# Patient Record
Sex: FEMALE | Race: WHITE | NOT HISPANIC OR LATINO
[De-identification: names, ages, dates, MRNs, and addresses within clinical notes are randomized per-mention and may not be internally consistent; named-entity substitution may affect disease eponyms.]

---

## 2017-12-18 ENCOUNTER — TRANSCRIPTION ENCOUNTER (OUTPATIENT)
Age: 72
End: 2017-12-18

## 2017-12-22 ENCOUNTER — APPOINTMENT (OUTPATIENT)
Dept: INTERNAL MEDICINE | Facility: CLINIC | Age: 72
End: 2017-12-22
Payer: MEDICARE

## 2017-12-22 VITALS
SYSTOLIC BLOOD PRESSURE: 108 MMHG | TEMPERATURE: 99.6 F | RESPIRATION RATE: 16 BRPM | HEART RATE: 88 BPM | DIASTOLIC BLOOD PRESSURE: 76 MMHG

## 2017-12-22 VITALS — HEIGHT: 65 IN

## 2017-12-22 DIAGNOSIS — Z82.49 FAMILY HISTORY OF ISCHEMIC HEART DISEASE AND OTHER DISEASES OF THE CIRCULATORY SYSTEM: ICD-10-CM

## 2017-12-22 DIAGNOSIS — F15.90 OTHER STIMULANT USE, UNSPECIFIED, UNCOMPLICATED: ICD-10-CM

## 2017-12-22 DIAGNOSIS — Z82.5 FAMILY HISTORY OF ASTHMA AND OTHER CHRONIC LOWER RESPIRATORY DISEASES: ICD-10-CM

## 2017-12-22 DIAGNOSIS — Z91.89 OTHER SPECIFIED PERSONAL RISK FACTORS, NOT ELSEWHERE CLASSIFIED: ICD-10-CM

## 2017-12-22 DIAGNOSIS — Z83.3 FAMILY HISTORY OF DIABETES MELLITUS: ICD-10-CM

## 2017-12-22 LAB — CYTOLOGY CVX/VAG DOC THIN PREP: NORMAL

## 2017-12-22 PROCEDURE — 99203 OFFICE O/P NEW LOW 30 MIN: CPT

## 2018-12-03 ENCOUNTER — APPOINTMENT (OUTPATIENT)
Dept: INTERNAL MEDICINE | Facility: CLINIC | Age: 73
End: 2018-12-03
Payer: MEDICARE

## 2018-12-03 VITALS — DIASTOLIC BLOOD PRESSURE: 72 MMHG | SYSTOLIC BLOOD PRESSURE: 132 MMHG | HEART RATE: 100 BPM | RESPIRATION RATE: 20 BRPM

## 2018-12-03 VITALS — HEIGHT: 65 IN

## 2018-12-03 DIAGNOSIS — Z87.09 PERSONAL HISTORY OF OTHER DISEASES OF THE RESPIRATORY SYSTEM: ICD-10-CM

## 2018-12-03 DIAGNOSIS — Z12.31 ENCOUNTER FOR SCREENING MAMMOGRAM FOR MALIGNANT NEOPLASM OF BREAST: ICD-10-CM

## 2018-12-03 PROCEDURE — 99214 OFFICE O/P EST MOD 30 MIN: CPT

## 2018-12-03 RX ORDER — AMOXICILLIN AND CLAVULANATE POTASSIUM 875; 125 MG/1; MG/1
875-125 TABLET, COATED ORAL
Qty: 20 | Refills: 0 | Status: DISCONTINUED | COMMUNITY
Start: 2017-12-22 | End: 2018-12-03

## 2018-12-03 RX ORDER — FLUTICASONE PROPIONATE 50 UG/1
50 SPRAY, METERED NASAL DAILY
Qty: 1 | Refills: 1 | Status: DISCONTINUED | COMMUNITY
Start: 2017-12-22 | End: 2018-12-03

## 2018-12-03 NOTE — PHYSICAL EXAM
[No Acute Distress] : no acute distress [Well Nourished] : well nourished [Well Developed] : well developed [Well-Appearing] : well-appearing [Normal Sclera/Conjunctiva] : normal sclera/conjunctiva [PERRL] : pupils equal round and reactive to light [EOMI] : extraocular movements intact [Normal Outer Ear/Nose] : the outer ears and nose were normal in appearance [Normal Oropharynx] : the oropharynx was normal [No JVD] : no jugular venous distention [Supple] : supple [No Lymphadenopathy] : no lymphadenopathy [Thyroid Normal, No Nodules] : the thyroid was normal and there were no nodules present [No Respiratory Distress] : no respiratory distress  [Clear to Auscultation] : lungs were clear to auscultation bilaterally [No Accessory Muscle Use] : no accessory muscle use [Normal Rate] : normal rate  [Regular Rhythm] : with a regular rhythm [Normal S1, S2] : normal S1 and S2 [No Murmur] : no murmur heard [No Carotid Bruits] : no carotid bruits [No Abdominal Bruit] : a ~M bruit was not heard ~T in the abdomen [Pedal Pulses Present] : the pedal pulses are present [No Edema] : there was no peripheral edema [Normal Appearance] : normal in appearance [No Nipple Discharge] : no nipple discharge [No Axillary Lymphadenopathy] : no axillary lymphadenopathy [Soft] : abdomen soft [Non Tender] : non-tender [Non-distended] : non-distended [No Masses] : no abdominal mass palpated [No HSM] : no HSM [Normal Bowel Sounds] : normal bowel sounds [Normal Axillary Nodes] : no axillary lymphadenopathy [Normal Posterior Cervical Nodes] : no posterior cervical lymphadenopathy [Normal Anterior Cervical Nodes] : no anterior cervical lymphadenopathy [No CVA Tenderness] : no CVA  tenderness [No Spinal Tenderness] : no spinal tenderness [Normal Gait] : normal gait [Coordination Grossly Intact] : coordination grossly intact [No Focal Deficits] : no focal deficits [Speech Grossly Normal] : speech grossly normal [Memory Grossly Normal] : memory grossly normal [Normal Affect] : the affect was normal [Alert and Oriented x3] : oriented to person, place, and time [Normal Mood] : the mood was normal [Normal Insight/Judgement] : insight and judgment were intact [de-identified] : + fibrocystic changes at the rt superior breast [de-identified] : some tenderrness at the lower rt ribs, shoulder and lateral epicondyle.  nl strength

## 2018-12-03 NOTE — REVIEW OF SYSTEMS
[Joint Pain] : joint pain [Muscle Pain] : muscle pain [Negative] : Heme/Lymph [Skin Rash] : no skin rash

## 2018-12-03 NOTE — HEALTH RISK ASSESSMENT
[Patient reported mammogram was normal] : Patient reported mammogram was normal [Patient reported PAP Smear was normal] : Patient reported PAP Smear was normal [Patient reported colonoscopy was normal] : Patient reported colonoscopy was normal [None] : None [With Family] : lives with family [Single] : single [Significant Other] : lives with significant other [Fully functional (bathing, dressing, toileting, transferring, walking, feeding)] : Fully functional (bathing, dressing, toileting, transferring, walking, feeding) [Fully functional (using the telephone, shopping, preparing meals, housekeeping, doing laundry, using] : Fully functional and needs no help or supervision to perform IADLs (using the telephone, shopping, preparing meals, housekeeping, doing laundry, using transportation, managing medications and managing finances) [No falls in past year] : Patient reported no falls in the past year [0] : 2) Feeling down, depressed, or hopeless: Not at all (0) [Change in mental status noted] : No change in mental status noted [MammogramDate] : 2013 [PapSmearDate] : 2013 [ColonoscopyDate] : 2013 [] : No [[id number]] : 0

## 2018-12-03 NOTE — HISTORY OF PRESENT ILLNESS
[FreeTextEntry1] : pain, htn [de-identified] : 72 y/o female with a hx of HTN , kidney donor here for f/u and acute sx.  \par Saw cardiology for the BP in August.  will be getting stress.  \par Has been on low sodium diet.  \par Has been going on exercise bike daily.\par Reports that she has been having pain for ~ 1 mo.  Located at the right shoulder to the scapula to the rt side and arm.  Described as dull ache, mild.  constant, waxes and wanes.  Some relief with tylenol.  Occ worse when sleeping with the arm up.  Has not been trying any other rx.  no other relieving or exacerbating factors.  No assoc weakness, numbness, erythema, swelling.  No bruising.  Had started after helping BF out of bed.  Worried re: tenderness at the side of the chest.  \par \par mamm/pap overdue.\par \par Had flu vaccine\par prevnar last year - due for pneumo on 12/14

## 2018-12-03 NOTE — ASSESSMENT
[FreeTextEntry1] : tenderness likely strain as noted.  HCM not up to date.  d/w pt going for screening.

## 2018-12-21 ENCOUNTER — FORM ENCOUNTER (OUTPATIENT)
Age: 73
End: 2018-12-21

## 2018-12-22 ENCOUNTER — OUTPATIENT (OUTPATIENT)
Dept: OUTPATIENT SERVICES | Facility: HOSPITAL | Age: 73
LOS: 1 days | End: 2018-12-22
Payer: MEDICARE

## 2018-12-22 ENCOUNTER — APPOINTMENT (OUTPATIENT)
Dept: MAMMOGRAPHY | Facility: CLINIC | Age: 73
End: 2018-12-22
Payer: MEDICARE

## 2018-12-22 DIAGNOSIS — Z00.8 ENCOUNTER FOR OTHER GENERAL EXAMINATION: ICD-10-CM

## 2018-12-22 DIAGNOSIS — Z12.31 ENCOUNTER FOR SCREENING MAMMOGRAM FOR MALIGNANT NEOPLASM OF BREAST: ICD-10-CM

## 2018-12-22 PROCEDURE — 77063 BREAST TOMOSYNTHESIS BI: CPT

## 2018-12-22 PROCEDURE — 77067 SCR MAMMO BI INCL CAD: CPT | Mod: 26

## 2018-12-22 PROCEDURE — 77067 SCR MAMMO BI INCL CAD: CPT

## 2018-12-22 PROCEDURE — 77063 BREAST TOMOSYNTHESIS BI: CPT | Mod: 26

## 2018-12-26 ENCOUNTER — FORM ENCOUNTER (OUTPATIENT)
Age: 73
End: 2018-12-26

## 2018-12-27 ENCOUNTER — OUTPATIENT (OUTPATIENT)
Dept: OUTPATIENT SERVICES | Facility: HOSPITAL | Age: 73
LOS: 1 days | End: 2018-12-27
Payer: MEDICARE

## 2018-12-27 ENCOUNTER — APPOINTMENT (OUTPATIENT)
Dept: ULTRASOUND IMAGING | Facility: CLINIC | Age: 73
End: 2018-12-27
Payer: MEDICARE

## 2018-12-27 DIAGNOSIS — Z00.8 ENCOUNTER FOR OTHER GENERAL EXAMINATION: ICD-10-CM

## 2018-12-27 PROCEDURE — 76642 ULTRASOUND BREAST LIMITED: CPT

## 2018-12-27 PROCEDURE — 76642 ULTRASOUND BREAST LIMITED: CPT | Mod: 26,RT

## 2018-12-28 ENCOUNTER — APPOINTMENT (OUTPATIENT)
Dept: INTERNAL MEDICINE | Facility: CLINIC | Age: 73
End: 2018-12-28
Payer: MEDICARE

## 2018-12-28 VITALS — WEIGHT: 187 LBS | BODY MASS INDEX: 31.16 KG/M2 | HEIGHT: 65 IN

## 2018-12-28 VITALS
TEMPERATURE: 98.3 F | SYSTOLIC BLOOD PRESSURE: 150 MMHG | DIASTOLIC BLOOD PRESSURE: 80 MMHG | HEART RATE: 80 BPM | RESPIRATION RATE: 16 BRPM

## 2018-12-28 VITALS — DIASTOLIC BLOOD PRESSURE: 80 MMHG | SYSTOLIC BLOOD PRESSURE: 142 MMHG

## 2018-12-28 PROCEDURE — 99214 OFFICE O/P EST MOD 30 MIN: CPT | Mod: 25

## 2018-12-28 PROCEDURE — 36415 COLL VENOUS BLD VENIPUNCTURE: CPT

## 2018-12-28 NOTE — HISTORY OF PRESENT ILLNESS
[FreeTextEntry1] : pain, htn, breast mass and lad [de-identified] : 74 y/o female with a hx of HTN , kidney donor, breast mass and lad on mammo/sono \par here for f/u and acute sx.  \par Saw cardiology for the BP in August.  had stress - pt reports as nl.  \par Has been on low sodium diet.  \par Has been going on exercise bike daily - on hold for the aches.  \par Reports that she continues to have aches.  Still primarily at the rt side.  Has been more generalized at the body knees, arm, neck, arms.  no fevers.  + mild sore throat.  tongue has been bothering her for ` 1 week like it was burned.  Sx have been waxing and waning.  no noted chest sx.  no GI sx.  no urinary sx. ? some fatigue.  some relief with tylenol.  Reports feeling sl better with taking amoxil for a few days.\par \par mammo/sono 12/18 - mass and axillary node - for bx.\par pap overdue.\par \par Had flu vaccine\par prevnar last year - due for pneumo

## 2018-12-28 NOTE — REVIEW OF SYSTEMS
[Fatigue] : fatigue [Sore Throat] : sore throat [Joint Pain] : joint pain [Muscle Pain] : muscle pain [Negative] : Heme/Lymph [Fever] : no fever [Chills] : no chills [Hot Flashes] : no hot flashes [Night Sweats] : no night sweats [Recent Change In Weight] : ~T no recent weight change [Earache] : no earache [Hearing Loss] : no hearing loss [Nosebleed] : no nosebleeds [Hoarseness] : no hoarseness [Nasal Discharge] : no nasal discharge [Postnasal Drip] : no postnasal drip [Skin Rash] : no skin rash

## 2018-12-28 NOTE — PHYSICAL EXAM
[No Acute Distress] : no acute distress [Well Nourished] : well nourished [Well Developed] : well developed [Well-Appearing] : well-appearing [Normal Sclera/Conjunctiva] : normal sclera/conjunctiva [PERRL] : pupils equal round and reactive to light [EOMI] : extraocular movements intact [Normal Outer Ear/Nose] : the outer ears and nose were normal in appearance [Normal Oropharynx] : the oropharynx was normal [No JVD] : no jugular venous distention [Supple] : supple [No Lymphadenopathy] : no lymphadenopathy [Thyroid Normal, No Nodules] : the thyroid was normal and there were no nodules present [No Respiratory Distress] : no respiratory distress  [Clear to Auscultation] : lungs were clear to auscultation bilaterally [No Accessory Muscle Use] : no accessory muscle use [Normal Rate] : normal rate  [Regular Rhythm] : with a regular rhythm [Normal S1, S2] : normal S1 and S2 [No Murmur] : no murmur heard [No Carotid Bruits] : no carotid bruits [No Abdominal Bruit] : a ~M bruit was not heard ~T in the abdomen [Pedal Pulses Present] : the pedal pulses are present [No Edema] : there was no peripheral edema [Soft] : abdomen soft [Non Tender] : non-tender [Non-distended] : non-distended [No Masses] : no abdominal mass palpated [No HSM] : no HSM [Normal Bowel Sounds] : normal bowel sounds [Normal Axillary Nodes] : no axillary lymphadenopathy [Normal Posterior Cervical Nodes] : no posterior cervical lymphadenopathy [Normal Anterior Cervical Nodes] : no anterior cervical lymphadenopathy [No CVA Tenderness] : no CVA  tenderness [No Spinal Tenderness] : no spinal tenderness [No Joint Swelling] : no joint swelling [Grossly Normal Strength/Tone] : grossly normal strength/tone [Normal Gait] : normal gait [Coordination Grossly Intact] : coordination grossly intact [No Focal Deficits] : no focal deficits [Speech Grossly Normal] : speech grossly normal [Memory Grossly Normal] : memory grossly normal [Normal Affect] : the affect was normal [Alert and Oriented x3] : oriented to person, place, and time [Normal Mood] : the mood was normal [Normal Insight/Judgement] : insight and judgment were intact

## 2019-01-02 ENCOUNTER — FORM ENCOUNTER (OUTPATIENT)
Age: 74
End: 2019-01-02

## 2019-01-03 ENCOUNTER — RESULT REVIEW (OUTPATIENT)
Age: 74
End: 2019-01-03

## 2019-01-03 ENCOUNTER — APPOINTMENT (OUTPATIENT)
Dept: ULTRASOUND IMAGING | Facility: CLINIC | Age: 74
End: 2019-01-03
Payer: MEDICARE

## 2019-01-03 ENCOUNTER — OUTPATIENT (OUTPATIENT)
Dept: OUTPATIENT SERVICES | Facility: HOSPITAL | Age: 74
LOS: 1 days | End: 2019-01-03
Payer: MEDICARE

## 2019-01-03 DIAGNOSIS — N63.10 UNSPECIFIED LUMP IN THE RIGHT BREAST, UNSPECIFIED QUADRANT: ICD-10-CM

## 2019-01-03 DIAGNOSIS — R59.0 LOCALIZED ENLARGED LYMPH NODES: ICD-10-CM

## 2019-01-03 PROCEDURE — A4648: CPT

## 2019-01-03 PROCEDURE — 19084 BX BREAST ADD LESION US IMAG: CPT

## 2019-01-03 PROCEDURE — 19083 BX BREAST 1ST LESION US IMAG: CPT

## 2019-01-03 PROCEDURE — 77065 DX MAMMO INCL CAD UNI: CPT | Mod: 26,RT

## 2019-01-03 PROCEDURE — 19084 BX BREAST ADD LESION US IMAG: CPT | Mod: RT

## 2019-01-03 PROCEDURE — 77065 DX MAMMO INCL CAD UNI: CPT

## 2019-01-03 PROCEDURE — 19083 BX BREAST 1ST LESION US IMAG: CPT | Mod: RT

## 2019-01-04 LAB
ALBUMIN SERPL ELPH-MCNC: 4.7 G/DL
ALP BLD-CCNC: 76 U/L
ALT SERPL-CCNC: 54 U/L
ANA SER IF-ACNC: NEGATIVE
ANION GAP SERPL CALC-SCNC: 13 MMOL/L
AST SERPL-CCNC: 43 U/L
BASOPHILS # BLD AUTO: 0.01 K/UL
BASOPHILS NFR BLD AUTO: 0.1 %
BILIRUB SERPL-MCNC: 0.4 MG/DL
BUN SERPL-MCNC: 27 MG/DL
CALCIUM SERPL-MCNC: 9.9 MG/DL
CHLORIDE SERPL-SCNC: 100 MMOL/L
CO2 SERPL-SCNC: 30 MMOL/L
CREAT SERPL-MCNC: 1.32 MG/DL
CRP SERPL-MCNC: 0.46 MG/DL
EOSINOPHIL # BLD AUTO: 0.27 K/UL
EOSINOPHIL NFR BLD AUTO: 3.9 %
ERYTHROCYTE [SEDIMENTATION RATE] IN BLOOD BY WESTERGREN METHOD: 15 MM/HR
GLUCOSE SERPL-MCNC: 154 MG/DL
HCT VFR BLD CALC: 43.7 %
HGB BLD-MCNC: 13.9 G/DL
IMM GRANULOCYTES NFR BLD AUTO: 0.3 %
LYMPHOCYTES # BLD AUTO: 2.38 K/UL
LYMPHOCYTES NFR BLD AUTO: 34.6 %
MAN DIFF?: NORMAL
MCHC RBC-ENTMCNC: 29.4 PG
MCHC RBC-ENTMCNC: 31.8 GM/DL
MCV RBC AUTO: 92.6 FL
MONOCYTES # BLD AUTO: 0.45 K/UL
MONOCYTES NFR BLD AUTO: 6.5 %
NEUTROPHILS # BLD AUTO: 3.75 K/UL
NEUTROPHILS NFR BLD AUTO: 54.6 %
PLATELET # BLD AUTO: 311 K/UL
POTASSIUM SERPL-SCNC: 4.7 MMOL/L
PROT SERPL-MCNC: 7.3 G/DL
RBC # BLD: 4.72 M/UL
RBC # FLD: 13.6 %
RHEUMATOID FACT SER QL: <10 IU/ML
SODIUM SERPL-SCNC: 143 MMOL/L
WBC # FLD AUTO: 6.88 K/UL

## 2019-01-08 ENCOUNTER — OTHER (OUTPATIENT)
Age: 74
End: 2019-01-08

## 2019-01-15 ENCOUNTER — OUTPATIENT (OUTPATIENT)
Dept: OUTPATIENT SERVICES | Facility: HOSPITAL | Age: 74
LOS: 1 days | Discharge: HOME | End: 2019-01-15

## 2019-01-15 ENCOUNTER — APPOINTMENT (OUTPATIENT)
Dept: SURGICAL ONCOLOGY | Facility: CLINIC | Age: 74
End: 2019-01-15
Payer: MEDICARE

## 2019-01-15 VITALS
OXYGEN SATURATION: 95 % | HEART RATE: 93 BPM | DIASTOLIC BLOOD PRESSURE: 86 MMHG | HEIGHT: 65 IN | WEIGHT: 187 LBS | BODY MASS INDEX: 31.16 KG/M2 | SYSTOLIC BLOOD PRESSURE: 143 MMHG

## 2019-01-15 DIAGNOSIS — C50.919 MALIGNANT NEOPLASM OF UNSPECIFIED SITE OF UNSPECIFIED FEMALE BREAST: ICD-10-CM

## 2019-01-15 PROCEDURE — 99204 OFFICE O/P NEW MOD 45 MIN: CPT

## 2019-01-15 NOTE — CONSULT LETTER
[Dear  ___] : Dear  [unfilled], [Consult Letter:] : I had the pleasure of evaluating your patient, [unfilled]. [( Thank you for referring [unfilled] for consultation for _____ )] : Thank you for referring [unfilled] for consultation for [unfilled] [Please see my note below.] : Please see my note below. [Consult Closing:] : Thank you very much for allowing me to participate in the care of this patient.  If you have any questions, please do not hesitate to contact me. [Sincerely,] : Sincerely, [FreeTextEntry3] : Demetrio Robertson MD, FICS, FACS\par , Surgical Oncology\par [facility]\par [address]\[place], [place]- [address]\par \par [address]\par [place], [place]- [address]\par \par (mob) [phone]\par (o) [phone]\par (f) [phone]\par

## 2019-01-15 NOTE — ASSESSMENT
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 Patient expressed verbal understanding to proceed with proposed plan. All questions answered.\par I have discussed the risks, benefits, alternatives, complications including but not limited bleeding, infection, damage to adjacent structures,pneumothorax to the patient in detail. Patient expressed verbal understanding. Written informed consent to be obtained in the preoperative period.\par

## 2019-01-15 NOTE — REASON FOR VISIT
[Initial Consultation] : an initial consultation for [Breast Cancer] : breast cancer [Family Member] : family member

## 2019-01-15 NOTE — PHYSICAL EXAM
[Normal] : supple, no neck mass and thyroid not enlarged [Normal Neck Lymph Nodes] : normal neck lymph nodes  [Normal Supraclavicular Lymph Nodes] : normal supraclavicular lymph nodes [Normal Groin Lymph Nodes] : normal groin lymph nodes [Normal Axillary Lymph Nodes] : normal axillary lymph nodes [Normal] : oriented to person, place and time, with appropriate affect [de-identified] : R outer upper quadrant palpable minimally tender 3 cm size breast mass with no overlying skin changes, R NAC- insignificant, no palpable axillary or cervical lymphadenopathy. L breast/ axilla/neck- no palpable masses

## 2019-01-15 NOTE — HISTORY OF PRESENT ILLNESS
[de-identified] : 73 Y F presents to my office with a complaint of palpable breast mass R breast since Dec 2018, underwent mammogram followed by CNB of R breast mass and R axillary lymph node suggestive of invasive ductal carcinoma with metastatic lymphadenopathy.\par No prior h/o breast biopsy or ca .\par No family h/o breast ca

## 2019-01-16 ENCOUNTER — LABORATORY RESULT (OUTPATIENT)
Age: 74
End: 2019-01-16

## 2019-01-16 ENCOUNTER — RESULT REVIEW (OUTPATIENT)
Age: 74
End: 2019-01-16

## 2019-01-16 ENCOUNTER — APPOINTMENT (OUTPATIENT)
Dept: HEMATOLOGY ONCOLOGY | Facility: CLINIC | Age: 74
End: 2019-01-16
Payer: MEDICARE

## 2019-01-16 VITALS
TEMPERATURE: 98.7 F | BODY MASS INDEX: 31.15 KG/M2 | SYSTOLIC BLOOD PRESSURE: 144 MMHG | WEIGHT: 186.95 LBS | OXYGEN SATURATION: 95 % | HEIGHT: 65 IN | DIASTOLIC BLOOD PRESSURE: 83 MMHG | RESPIRATION RATE: 16 BRPM | HEART RATE: 90 BPM

## 2019-01-16 LAB
BASOPHILS # BLD AUTO: 0.1 K/UL — SIGNIFICANT CHANGE UP (ref 0–0.2)
BASOPHILS NFR BLD AUTO: 0.6 % — SIGNIFICANT CHANGE UP (ref 0–2)
EOSINOPHIL # BLD AUTO: 0.3 K/UL — SIGNIFICANT CHANGE UP (ref 0–0.5)
EOSINOPHIL NFR BLD AUTO: 3.1 % — SIGNIFICANT CHANGE UP (ref 0–6)
HCT VFR BLD CALC: 39.1 % — SIGNIFICANT CHANGE UP (ref 34.5–45)
HGB BLD-MCNC: 13.6 G/DL — SIGNIFICANT CHANGE UP (ref 11.5–15.5)
LYMPHOCYTES # BLD AUTO: 3.4 K/UL — HIGH (ref 1–3.3)
LYMPHOCYTES # BLD AUTO: 38.8 % — SIGNIFICANT CHANGE UP (ref 13–44)
MCHC RBC-ENTMCNC: 30.2 PG — SIGNIFICANT CHANGE UP (ref 27–34)
MCHC RBC-ENTMCNC: 34.7 G/DL — SIGNIFICANT CHANGE UP (ref 32–36)
MCV RBC AUTO: 86.9 FL — SIGNIFICANT CHANGE UP (ref 80–100)
MONOCYTES # BLD AUTO: 0.5 K/UL — SIGNIFICANT CHANGE UP (ref 0–0.9)
MONOCYTES NFR BLD AUTO: 6 % — SIGNIFICANT CHANGE UP (ref 2–14)
NEUTROPHILS # BLD AUTO: 4.5 K/UL — SIGNIFICANT CHANGE UP (ref 1.8–7.4)
NEUTROPHILS NFR BLD AUTO: 51.4 % — SIGNIFICANT CHANGE UP (ref 43–77)
PLATELET # BLD AUTO: 287 K/UL — SIGNIFICANT CHANGE UP (ref 150–400)
RBC # BLD: 4.5 M/UL — SIGNIFICANT CHANGE UP (ref 3.8–5.2)
RBC # FLD: 11.6 % — SIGNIFICANT CHANGE UP (ref 10.3–14.5)
WBC # BLD: 8.7 K/UL — SIGNIFICANT CHANGE UP (ref 3.8–10.5)
WBC # FLD AUTO: 8.7 K/UL — SIGNIFICANT CHANGE UP (ref 3.8–10.5)

## 2019-01-16 PROCEDURE — 99205 OFFICE O/P NEW HI 60 MIN: CPT

## 2019-01-16 RX ORDER — ZOSTER VACCINE RECOMBINANT, ADJUVANTED 50 MCG/0.5
50 KIT INTRAMUSCULAR DAILY
Qty: 1 | Refills: 1 | Status: DISCONTINUED | COMMUNITY
Start: 2018-12-03 | End: 2019-01-16

## 2019-01-16 RX ORDER — LIDOCAINE HYDROCHLORIDE 20 MG/ML
2 SOLUTION OROPHARYNGEAL
Qty: 1 | Refills: 0 | Status: DISCONTINUED | COMMUNITY
Start: 2018-12-28 | End: 2019-01-16

## 2019-01-16 RX ORDER — AMOXICILLIN AND CLAVULANATE POTASSIUM 875; 125 MG/1; MG/1
875-125 TABLET, COATED ORAL
Qty: 14 | Refills: 0 | Status: DISCONTINUED | COMMUNITY
Start: 2018-12-28 | End: 2019-01-16

## 2019-01-16 NOTE — REASON FOR VISIT
[Initial Consultation] : an initial consultation [Family Member] : family member [FreeTextEntry2] : Breast Cancer

## 2019-01-16 NOTE — PHYSICAL EXAM
[Fully active, able to carry on all pre-disease performance without restriction] : Status 0 - Fully active, able to carry on all pre-disease performance without restriction [Normal] : affect appropriate [de-identified] : RUOQ ~10'o clock amorphous~3cm palpable breast mass, core biopsy sites noted with some bruising, well healed. ~1cm lymph node palpated R axilla inferiorly, No other masses or adenopathy palpable

## 2019-01-16 NOTE — HISTORY OF PRESENT ILLNESS
[Disease: _____________________] : Disease: [unfilled] [T: ___] : T[unfilled] [N: ___] : N[unfilled] [AJCC Stage: ____] : AJCC Stage: [unfilled] [de-identified] : 72yo woman presenting for medical oncology consultation.\par \par Last mammogram . She has no history of abnormal breast imaging, breast biopsies, or surgeries.\par \par She saw her PMD who sent her for a screening mammogram after she palpated a mass on the R side of her chest 2018. She had been having body aches/soreness and feeling general malaise since November, despite a course of antibiotics (just after flu shot administered). Also some R shoulder/arm pains.\par \par 18 Mammogram screenin.7cm irregular spiculated mass upper outer right breast 8cm FN with associated architectural distortion and several associated microcalcifications. Partially included enlarged right axillary lymph nodes. BIRADS 0\par \par 18 bilateral breast US: R breast 10:00 7cm FN 2.6cm irregular hypoechoic mass (biopsy recommended). Inferior right axillary lymph node with focal area of eccentric cortical thickening (recommend US guided biopsy). 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 Michael 7/. 1.4cm involved, DCIS cribiform pattern with high grade nuclear atypia and apocrine cytology very focally present. (coil shaped clip) R axillary lymph node: metastatic ductal carcinoma with apocrine cytology involving a lymph node (hydromark marking clip) ER 0% IA 0% HER2 IHC 0 negative\par \par She saw Dr. Demetrio Robertson yesterday who recommended medical oncology consultation for neoadjuvant chemotherapy. \par \par She notes some occasional body aches still and fatigue. She notes an intermittent tongue sensation - like a scald from food (though she does not recall an episode of this). Otherwise she feels well. She is active at baseline, uses stationary bike at home most days for exercise. Patient denies headache, vision changes, fevers, chills, night sweats, dizziness, cough, chest pain, shortness of breath, palpitations, decreased exercise tolerance, nausea, vomiting abdominal pain, diarrhea, constipation, dysuria, back pain, edema, new rashes, bleeding or bruising, new or focal neuro symptoms, weight loss or gain.\par \par Pertinent History:\par No family history of breast or ovarian cancer.\par HTN - saw Cardiologist 2018, stress test normal per patient, Dr. Samaniego [facility], sees him annually. BP well controlled generally \par HLD \par Single kidney (kidney donor to brother in ) - last Cr 1.32 (18) though patient notes no prior renal issues\par Transaminitis AST 43, ALT 54 (18), no prior history\par BCC of skin removed\par Hysterectomy for fibroid uterus\par Cscope last ~5 years ago, no polyps per patient\par PMD Lance Lafleur\par Single, lives with her son Richard who is here with her today. 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 She is a retired RN. Planning a trip to [facility] with her boyfriend in May. [de-identified] : [id number]-

## 2019-01-17 ENCOUNTER — OTHER (OUTPATIENT)
Age: 74
End: 2019-01-17

## 2019-01-17 LAB
ALBUMIN SERPL ELPH-MCNC: 5 G/DL
ALP BLD-CCNC: 72 U/L
ALT SERPL-CCNC: 27 U/L
ANION GAP SERPL CALC-SCNC: 14 MMOL/L
APTT BLD: 31.3 SEC
AST SERPL-CCNC: 19 U/L
BILIRUB SERPL-MCNC: 0.3 MG/DL
BUN SERPL-MCNC: 26 MG/DL
CALCIUM SERPL-MCNC: 10.4 MG/DL
CHLORIDE SERPL-SCNC: 103 MMOL/L
CO2 SERPL-SCNC: 26 MMOL/L
CREAT SERPL-MCNC: 1.26 MG/DL
GLUCOSE SERPL-MCNC: 127 MG/DL
HBV CORE IGG+IGM SER QL: NONREACTIVE
HBV CORE IGM SER QL: NONREACTIVE
HBV SURFACE AB SER QL: NONREACTIVE
HBV SURFACE AG SER QL: NONREACTIVE
HCV AB SER QL: NONREACTIVE
HCV S/CO RATIO: 0.04 S/CO
INR PPP: 0.89 RATIO
POTASSIUM SERPL-SCNC: 3.8 MMOL/L
PROT SERPL-MCNC: 7.4 G/DL
PT BLD: 9.9 SEC
SODIUM SERPL-SCNC: 142 MMOL/L

## 2019-01-18 ENCOUNTER — FORM ENCOUNTER (OUTPATIENT)
Age: 74
End: 2019-01-18

## 2019-01-18 LAB — HEPATITIS A IGG ANTIBODY: REACTIVE

## 2019-01-19 ENCOUNTER — APPOINTMENT (OUTPATIENT)
Dept: NUCLEAR MEDICINE | Facility: IMAGING CENTER | Age: 74
End: 2019-01-19
Payer: MEDICARE

## 2019-01-19 ENCOUNTER — OUTPATIENT (OUTPATIENT)
Dept: OUTPATIENT SERVICES | Facility: HOSPITAL | Age: 74
LOS: 1 days | End: 2019-01-19
Payer: MEDICARE

## 2019-01-19 DIAGNOSIS — C50.919 MALIGNANT NEOPLASM OF UNSPECIFIED SITE OF UNSPECIFIED FEMALE BREAST: ICD-10-CM

## 2019-01-19 PROCEDURE — A9552: CPT

## 2019-01-19 PROCEDURE — 78815 PET IMAGE W/CT SKULL-THIGH: CPT

## 2019-01-19 PROCEDURE — 78815 PET IMAGE W/CT SKULL-THIGH: CPT | Mod: 26,PI

## 2019-01-23 ENCOUNTER — APPOINTMENT (OUTPATIENT)
Dept: CV DIAGNOSITCS | Facility: HOSPITAL | Age: 74
End: 2019-01-23

## 2019-01-23 ENCOUNTER — OUTPATIENT (OUTPATIENT)
Dept: OUTPATIENT SERVICES | Facility: HOSPITAL | Age: 74
LOS: 1 days | End: 2019-01-23
Payer: MEDICARE

## 2019-01-23 DIAGNOSIS — C50.919 MALIGNANT NEOPLASM OF UNSPECIFIED SITE OF UNSPECIFIED FEMALE BREAST: ICD-10-CM

## 2019-01-23 PROCEDURE — 93306 TTE W/DOPPLER COMPLETE: CPT | Mod: 26

## 2019-01-23 PROCEDURE — C8929: CPT

## 2019-01-24 ENCOUNTER — FORM ENCOUNTER (OUTPATIENT)
Age: 74
End: 2019-01-24

## 2019-01-24 ENCOUNTER — OUTPATIENT (OUTPATIENT)
Dept: OUTPATIENT SERVICES | Facility: HOSPITAL | Age: 74
LOS: 1 days | End: 2019-01-24
Payer: MEDICARE

## 2019-01-24 VITALS
WEIGHT: 210.1 LBS | DIASTOLIC BLOOD PRESSURE: 88 MMHG | RESPIRATION RATE: 18 BRPM | HEIGHT: 64 IN | TEMPERATURE: 97 F | SYSTOLIC BLOOD PRESSURE: 138 MMHG | HEART RATE: 81 BPM

## 2019-01-24 DIAGNOSIS — Z90.5 ACQUIRED ABSENCE OF KIDNEY: Chronic | ICD-10-CM

## 2019-01-24 DIAGNOSIS — Z98.891 HISTORY OF UTERINE SCAR FROM PREVIOUS SURGERY: Chronic | ICD-10-CM

## 2019-01-24 DIAGNOSIS — C50.911 MALIGNANT NEOPLASM OF UNSPECIFIED SITE OF RIGHT FEMALE BREAST: ICD-10-CM

## 2019-01-24 DIAGNOSIS — Z90.711 ACQUIRED ABSENCE OF UTERUS WITH REMAINING CERVICAL STUMP: Chronic | ICD-10-CM

## 2019-01-24 DIAGNOSIS — C50.919 MALIGNANT NEOPLASM OF UNSPECIFIED SITE OF UNSPECIFIED FEMALE BREAST: ICD-10-CM

## 2019-01-24 DIAGNOSIS — I10 ESSENTIAL (PRIMARY) HYPERTENSION: ICD-10-CM

## 2019-01-24 LAB
ALBUMIN SERPL ELPH-MCNC: 4.4 G/DL — SIGNIFICANT CHANGE UP (ref 3.3–5)
ALP SERPL-CCNC: 62 U/L — SIGNIFICANT CHANGE UP (ref 40–120)
ALT FLD-CCNC: 24 U/L — SIGNIFICANT CHANGE UP (ref 4–33)
ANION GAP SERPL CALC-SCNC: 14 MMO/L — SIGNIFICANT CHANGE UP (ref 7–14)
AST SERPL-CCNC: 17 U/L — SIGNIFICANT CHANGE UP (ref 4–32)
BILIRUB SERPL-MCNC: 0.3 MG/DL — SIGNIFICANT CHANGE UP (ref 0.2–1.2)
BUN SERPL-MCNC: 27 MG/DL — HIGH (ref 7–23)
CALCIUM SERPL-MCNC: 9.4 MG/DL — SIGNIFICANT CHANGE UP (ref 8.4–10.5)
CHLORIDE SERPL-SCNC: 102 MMOL/L — SIGNIFICANT CHANGE UP (ref 98–107)
CO2 SERPL-SCNC: 26 MMOL/L — SIGNIFICANT CHANGE UP (ref 22–31)
CREAT SERPL-MCNC: 1.18 MG/DL — SIGNIFICANT CHANGE UP (ref 0.5–1.3)
GLUCOSE SERPL-MCNC: 164 MG/DL — HIGH (ref 70–99)
POTASSIUM SERPL-MCNC: 3.7 MMOL/L — SIGNIFICANT CHANGE UP (ref 3.5–5.3)
POTASSIUM SERPL-SCNC: 3.7 MMOL/L — SIGNIFICANT CHANGE UP (ref 3.5–5.3)
PROT SERPL-MCNC: 6.9 G/DL — SIGNIFICANT CHANGE UP (ref 6–8.3)
SODIUM SERPL-SCNC: 142 MMOL/L — SIGNIFICANT CHANGE UP (ref 135–145)

## 2019-01-24 PROCEDURE — 93010 ELECTROCARDIOGRAM REPORT: CPT

## 2019-01-24 NOTE — H&P PST ADULT - GASTROINTESTINAL DETAILS
soft/nontender/no guarding/no bruit/no rebound tenderness/no rigidity/no organomegaly/no distention/bowel sounds normal/no masses palpable

## 2019-01-24 NOTE — H&P PST ADULT - RS GEN PE MLT RESP DETAILS PC
breath sounds equal/no wheezes/no rales/respirations non-labored/clear to auscultation bilaterally/no rhonchi

## 2019-01-24 NOTE — H&P PST ADULT - PSH
History of     History of unilateral nephrectomy  kidney donor - left  S/P partial hysterectomy  uterine fibroids

## 2019-01-24 NOTE — H&P PST ADULT - PROBLEM SELECTOR PLAN 1
Scheduled for Mediport Insertion on 01/25/19.  Lab result pending.  Preop, famotidine and chlorhexidine instructions provided and questions addressed.

## 2019-01-24 NOTE — H&P PST ADULT - NEGATIVE OPHTHALMOLOGIC SYMPTOMS
no lacrimation L/no discharge L/no lacrimation R/no blurred vision L/no blurred vision R/no discharge R

## 2019-01-24 NOTE — H&P PST ADULT - PMH
Essential hypertension    Gastroesophageal reflux disease without esophagitis    Malignant neoplasm of breast (female)  right Essential hypertension    Gastroesophageal reflux disease without esophagitis    Malignant neoplasm of breast (female)  right  Obesity

## 2019-01-24 NOTE — H&P PST ADULT - NSANTHOSAYNRD_GEN_A_CORE
No. JOCELYN screening performed.  STOP BANG Legend: 0-2 = LOW Risk; 3-4 = INTERMEDIATE Risk; 5-8 = HIGH Risk

## 2019-01-24 NOTE — H&P PST ADULT - FAMILY HISTORY
Sibling  Still living? No  Family history of type 2 diabetes mellitus in brother, Age at diagnosis: Age Unknown     Mother  Still living? Yes, Estimated age:   Family history of hypertension in mother, Age at diagnosis: Age Unknown  Family history of COPD (chronic obstructive pulmonary disease), Age at diagnosis: Age Unknown     Father  Still living? No  Family history of cirrhosis of liver, Age at diagnosis: Age Unknown     Child  Still living? Yes, Estimated age: Age Unknown  Family history of hypertension, Age at diagnosis: Age Unknown

## 2019-01-24 NOTE — H&P PST ADULT - HISTORY OF PRESENT ILLNESS
73 yr old female with medical hx htn and GERD presents for preop evaluation with dx of Malignant Neoplasm of right breast.  Pt went for routine mammogram in December after palpating a mass.  Pt subsequently had sonogram and biopsy.  Pt is now scheduled for Mediport Insertion on 01/25/19.

## 2019-01-25 ENCOUNTER — APPOINTMENT (OUTPATIENT)
Dept: SURGICAL ONCOLOGY | Facility: AMBULATORY SURGERY CENTER | Age: 74
End: 2019-01-25

## 2019-01-25 ENCOUNTER — OUTPATIENT (OUTPATIENT)
Dept: OUTPATIENT SERVICES | Facility: HOSPITAL | Age: 74
LOS: 1 days | Discharge: HOME | End: 2019-01-25
Payer: MEDICARE

## 2019-01-25 VITALS
TEMPERATURE: 98 F | RESPIRATION RATE: 18 BRPM | SYSTOLIC BLOOD PRESSURE: 140 MMHG | OXYGEN SATURATION: 98 % | WEIGHT: 210.1 LBS | HEART RATE: 81 BPM | HEIGHT: 64 IN | DIASTOLIC BLOOD PRESSURE: 72 MMHG

## 2019-01-25 VITALS
OXYGEN SATURATION: 100 % | HEART RATE: 87 BPM | TEMPERATURE: 98 F | DIASTOLIC BLOOD PRESSURE: 78 MMHG | RESPIRATION RATE: 16 BRPM | SYSTOLIC BLOOD PRESSURE: 141 MMHG

## 2019-01-25 DIAGNOSIS — Z98.891 HISTORY OF UTERINE SCAR FROM PREVIOUS SURGERY: Chronic | ICD-10-CM

## 2019-01-25 DIAGNOSIS — Z90.5 ACQUIRED ABSENCE OF KIDNEY: Chronic | ICD-10-CM

## 2019-01-25 DIAGNOSIS — Z90.711 ACQUIRED ABSENCE OF UTERUS WITH REMAINING CERVICAL STUMP: Chronic | ICD-10-CM

## 2019-01-25 DIAGNOSIS — C50.911 MALIGNANT NEOPLASM OF UNSPECIFIED SITE OF RIGHT FEMALE BREAST: ICD-10-CM

## 2019-01-25 PROCEDURE — 71045 X-RAY EXAM CHEST 1 VIEW: CPT | Mod: 26

## 2019-01-25 PROCEDURE — 36561 INSERT TUNNELED CV CATH: CPT

## 2019-01-25 PROCEDURE — 77001 FLUOROGUIDE FOR VEIN DEVICE: CPT | Mod: 26,59

## 2019-01-25 RX ORDER — OXYCODONE HYDROCHLORIDE 5 MG/1
1 TABLET ORAL
Qty: 1010 | Refills: 0 | OUTPATIENT
Start: 2019-01-25 | End: 2019-01-26

## 2019-01-25 RX ORDER — OXYCODONE HYDROCHLORIDE 5 MG/1
1 TABLET ORAL
Qty: 10 | Refills: 0
Start: 2019-01-25 | End: 2019-01-26

## 2019-01-25 NOTE — ASU DISCHARGE PLAN (ADULT/PEDIATRIC). - NOTIFY
Swelling that continues/Inability to Tolerate Liquids or Foods/Fever greater than 101/Bleeding that does not stop/Pain not relieved by Medications/Persistent Nausea and Vomiting

## 2019-01-25 NOTE — ASU DISCHARGE PLAN (ADULT/PEDIATRIC). - MEDICATION SUMMARY - MEDICATIONS TO TAKE
I will START or STAY ON the medications listed below when I get home from the hospital:    oxyCODONE 5 mg oral capsule  -- 1 cap(s) by mouth every 6 hours, As Needed MDD:6  -- Caution federal law prohibits the transfer of this drug to any person other  than the person for whom it was prescribed.  It is very important that you take or use this exactly as directed.  Do not skip doses or discontinue unless directed by your doctor.  May cause drowsiness.  Alcohol may intensify this effect.  Use care when operating dangerous machinery.  This prescription cannot be refilled.  Using more of this medication than prescribed may cause serious breathing problems.    -- Indication: For Malignant neoplasm of right female breast    Diovan  mg-25 mg oral tablet  -- 1 tab(s) by mouth once a day  -- Indication: For home med    Procardia XL 30 mg oral tablet, extended release  -- 1 tab(s) by mouth once a day  -- Indication: For home med    PriLOSEC 20 mg oral delayed release capsule  -- 1 cap(s) by mouth once a day  -- Indication: For home med

## 2019-01-26 ENCOUNTER — FORM ENCOUNTER (OUTPATIENT)
Age: 74
End: 2019-01-26

## 2019-01-27 ENCOUNTER — FORM ENCOUNTER (OUTPATIENT)
Age: 74
End: 2019-01-27

## 2019-01-27 ENCOUNTER — APPOINTMENT (OUTPATIENT)
Dept: MRI IMAGING | Facility: IMAGING CENTER | Age: 74
End: 2019-01-27

## 2019-01-27 ENCOUNTER — OUTPATIENT (OUTPATIENT)
Dept: OUTPATIENT SERVICES | Facility: HOSPITAL | Age: 74
LOS: 1 days | End: 2019-01-27
Payer: MEDICARE

## 2019-01-27 DIAGNOSIS — C50.919 MALIGNANT NEOPLASM OF UNSPECIFIED SITE OF UNSPECIFIED FEMALE BREAST: ICD-10-CM

## 2019-01-27 DIAGNOSIS — Z90.711 ACQUIRED ABSENCE OF UTERUS WITH REMAINING CERVICAL STUMP: Chronic | ICD-10-CM

## 2019-01-27 DIAGNOSIS — Z98.891 HISTORY OF UTERINE SCAR FROM PREVIOUS SURGERY: Chronic | ICD-10-CM

## 2019-01-27 DIAGNOSIS — Z90.5 ACQUIRED ABSENCE OF KIDNEY: Chronic | ICD-10-CM

## 2019-01-27 PROCEDURE — 70553 MRI BRAIN STEM W/O & W/DYE: CPT

## 2019-01-27 PROCEDURE — 70553 MRI BRAIN STEM W/O & W/DYE: CPT | Mod: 26

## 2019-01-27 PROCEDURE — A9585: CPT

## 2019-01-28 ENCOUNTER — APPOINTMENT (OUTPATIENT)
Dept: HEMATOLOGY ONCOLOGY | Facility: CLINIC | Age: 74
End: 2019-01-28
Payer: MEDICARE

## 2019-01-28 ENCOUNTER — OUTPATIENT (OUTPATIENT)
Dept: OUTPATIENT SERVICES | Facility: HOSPITAL | Age: 74
LOS: 1 days | Discharge: HOME | End: 2019-01-28

## 2019-01-28 ENCOUNTER — APPOINTMENT (OUTPATIENT)
Dept: CARDIOLOGY | Facility: CLINIC | Age: 74
End: 2019-01-28
Payer: MEDICARE

## 2019-01-28 ENCOUNTER — APPOINTMENT (OUTPATIENT)
Dept: MRI IMAGING | Facility: IMAGING CENTER | Age: 74
End: 2019-01-28
Payer: MEDICARE

## 2019-01-28 ENCOUNTER — NON-APPOINTMENT (OUTPATIENT)
Age: 74
End: 2019-01-28

## 2019-01-28 ENCOUNTER — APPOINTMENT (OUTPATIENT)
Dept: CT IMAGING | Facility: IMAGING CENTER | Age: 74
End: 2019-01-28
Payer: MEDICARE

## 2019-01-28 ENCOUNTER — OUTPATIENT (OUTPATIENT)
Dept: OUTPATIENT SERVICES | Facility: HOSPITAL | Age: 74
LOS: 1 days | End: 2019-01-28
Payer: MEDICARE

## 2019-01-28 VITALS
BODY MASS INDEX: 30.99 KG/M2 | HEIGHT: 65 IN | HEART RATE: 84 BPM | WEIGHT: 186 LBS | SYSTOLIC BLOOD PRESSURE: 131 MMHG | DIASTOLIC BLOOD PRESSURE: 82 MMHG | OXYGEN SATURATION: 95 %

## 2019-01-28 DIAGNOSIS — Z90.711 ACQUIRED ABSENCE OF UTERUS WITH REMAINING CERVICAL STUMP: Chronic | ICD-10-CM

## 2019-01-28 DIAGNOSIS — C50.919 MALIGNANT NEOPLASM OF UNSPECIFIED SITE OF UNSPECIFIED FEMALE BREAST: ICD-10-CM

## 2019-01-28 DIAGNOSIS — Z98.891 HISTORY OF UTERINE SCAR FROM PREVIOUS SURGERY: Chronic | ICD-10-CM

## 2019-01-28 DIAGNOSIS — Z90.5 ACQUIRED ABSENCE OF KIDNEY: Chronic | ICD-10-CM

## 2019-01-28 PROBLEM — K21.9 GASTRO-ESOPHAGEAL REFLUX DISEASE WITHOUT ESOPHAGITIS: Chronic | Status: ACTIVE | Noted: 2019-01-24

## 2019-01-28 PROBLEM — I10 ESSENTIAL (PRIMARY) HYPERTENSION: Chronic | Status: ACTIVE | Noted: 2019-01-24

## 2019-01-28 PROBLEM — E66.9 OBESITY, UNSPECIFIED: Chronic | Status: ACTIVE | Noted: 2019-01-24

## 2019-01-28 PROCEDURE — 71260 CT THORAX DX C+: CPT | Mod: 26

## 2019-01-28 PROCEDURE — 93000 ELECTROCARDIOGRAM COMPLETE: CPT

## 2019-01-28 PROCEDURE — 82565 ASSAY OF CREATININE: CPT

## 2019-01-28 PROCEDURE — 99204 OFFICE O/P NEW MOD 45 MIN: CPT

## 2019-01-28 PROCEDURE — 99215 OFFICE O/P EST HI 40 MIN: CPT

## 2019-01-28 PROCEDURE — 71260 CT THORAX DX C+: CPT

## 2019-01-29 ENCOUNTER — LABORATORY RESULT (OUTPATIENT)
Age: 74
End: 2019-01-29

## 2019-01-29 ENCOUNTER — RESULT REVIEW (OUTPATIENT)
Age: 74
End: 2019-01-29

## 2019-01-29 ENCOUNTER — APPOINTMENT (OUTPATIENT)
Dept: INFUSION THERAPY | Facility: HOSPITAL | Age: 74
End: 2019-01-29

## 2019-01-29 LAB
BASOPHILS # BLD AUTO: 0.1 K/UL — SIGNIFICANT CHANGE UP (ref 0–0.2)
BASOPHILS NFR BLD AUTO: 0.8 % — SIGNIFICANT CHANGE UP (ref 0–2)
EOSINOPHIL # BLD AUTO: 0.3 K/UL — SIGNIFICANT CHANGE UP (ref 0–0.5)
EOSINOPHIL NFR BLD AUTO: 2.9 % — SIGNIFICANT CHANGE UP (ref 0–6)
HCT VFR BLD CALC: 38.5 % — SIGNIFICANT CHANGE UP (ref 34.5–45)
HGB BLD-MCNC: 13.2 G/DL — SIGNIFICANT CHANGE UP (ref 11.5–15.5)
LYMPHOCYTES # BLD AUTO: 2.6 K/UL — SIGNIFICANT CHANGE UP (ref 1–3.3)
LYMPHOCYTES # BLD AUTO: 29.1 % — SIGNIFICANT CHANGE UP (ref 13–44)
MCHC RBC-ENTMCNC: 29.8 PG — SIGNIFICANT CHANGE UP (ref 27–34)
MCHC RBC-ENTMCNC: 34.2 G/DL — SIGNIFICANT CHANGE UP (ref 32–36)
MCV RBC AUTO: 87.2 FL — SIGNIFICANT CHANGE UP (ref 80–100)
MONOCYTES # BLD AUTO: 0.6 K/UL — SIGNIFICANT CHANGE UP (ref 0–0.9)
MONOCYTES NFR BLD AUTO: 7.2 % — SIGNIFICANT CHANGE UP (ref 2–14)
NEUTROPHILS # BLD AUTO: 5.3 K/UL — SIGNIFICANT CHANGE UP (ref 1.8–7.4)
NEUTROPHILS NFR BLD AUTO: 60 % — SIGNIFICANT CHANGE UP (ref 43–77)
PLATELET # BLD AUTO: 238 K/UL — SIGNIFICANT CHANGE UP (ref 150–400)
RBC # BLD: 4.42 M/UL — SIGNIFICANT CHANGE UP (ref 3.8–5.2)
RBC # FLD: 11.8 % — SIGNIFICANT CHANGE UP (ref 10.3–14.5)
WBC # BLD: 8.8 K/UL — SIGNIFICANT CHANGE UP (ref 3.8–10.5)
WBC # FLD AUTO: 8.8 K/UL — SIGNIFICANT CHANGE UP (ref 3.8–10.5)

## 2019-01-29 RX ORDER — NIFEDIPINE 30 MG
1 TABLET, EXTENDED RELEASE 24 HR ORAL
Qty: 0 | Refills: 0 | COMMUNITY

## 2019-01-29 NOTE — ASSESSMENT
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 Staging imaging without clear evidence of metastatic disease. Anatomic stage IIB, AJCC 8th edition clinical prognostic stage IIIB.\par \par I again discussed the natural history and progression of invasive breast cancer, as well as the significance of hormone receptor positivity, HER2 status, prognosis, and treatment options. When breast cancer is confined to the breast or axillary lymph nodes it is potentially curable.  When cancer is detected in distant organs or bone, it is treatable but not curable. I explained that even when a carcinoma has been completely removed by surgery, microscopic cells can escape into the circulation, seed, and grow into clinically significant metastases that are incurable. The role of chemotherapy, immunotherapy, and hormonal therapies are to reduce this risk. \par \par I explained that there are two approaches to breast surgery and treatments for invasive breast cancers. In one instance, surgery is performed first, either by mastectomy or lumpectomy, followed by post-operative "adjuvant" chemotherapy. The second approach involves administration of chemotherapy before surgery ("neoadjuvant") followed by breast surgery. Neoadjuvant combination chemotherapy is administered to locally advanced breast cancer patients (breast cancer that has spread outside of the breast to the local lymph nodes) in order to make surgery feasible, increase the chance of cure, and potentially decrease the amount of axillary lymph node surgery that is necessary. We discussed the role of neoadjuvant chemotherapy in triple negative breast cancer, given the aggressive nature of this subtype of breast cancer. A potential advantage of neoadjuvant chemotherapy is the potentially to shrink the tumor and possibly thereafter perform breast-conserving lumpectomy surgery rather than complete mastectomy. Patient notes she has already decided that she would prefer to have a bilateral mastectomy ultimately. We reviewed that she would tentatively be scheduled for surgery within a few weeks of completing neoadjuvant chemotherapy. Disease is monitored with clinical exam with each visit every 2-3 weeks and occasionally with repeat imaging pre-operatively to assess for response to therapy.  \par \par We discussed my recommendation for “ACT” chemotherapy administered on a dose-dense schedule (ddAC-T). This regimen consists of Adriamycin 60mg/m2 + cytoxan 600mg/m2 every 2 weeks x 4 cycles followed by Taxol 80mg/m2 weekly x 12 cycles. Neulasta is administered either via on-body injector placed on the day of chemotherapy after each "AC" chemotherapy is administered. Prior to treatment on day 1 of each cycle she will have an office visit for lab work, exam, and review of toxicities/symptoms to monitor for. We discussed potential side-effects including nausea, vomiting, alopecia, fatigue, body aches, myelosuppression potentially necessitating transfusion support (for which Neulasta is used to reduce the period of neutropenia and therefore the risk of infection), increased risk of infection (potentially resulting in sepsis, septic shock or even death), liver damage, kidney damage (especially at risk given her single kidney), mucositis, anaphylaxis, and potential long term and serious effects of neuropathy, cardiac toxicity/heart failure, and leukemia/blood cancers that are treatment-related (approximately 1% at 10 years), were reviewed in detail among others. We discussed that anthracycline-induced cardiotoxicity may manifest as early (or acute) or late (delayed) events. She will have periodic echocardiographic monitoring will be performed during and after treatment, and follow up with Oncocardiology Dr. Yadav. We also discussed the use of cold caps to prevent alopecia (she defers this option), as well as anti-emetics and premedication to treat symptoms.\par \par All of the patient’s questions were answered and she expressed understanding of the risks and benefits of therapy. She elects to proceed with treatment and she has provided written consent for this today (witnessed by TOYIN Euceda). She was given written information regarding the treatment plan and potential side effects for review. \par \par -pre-treatment labs reviewed, CrCl slightly impaired (single kidney) and prior mild LFT abnormalities resolved - close monitoring on treatment and low threshold for IV fluid hydration, discussed appropriate hydration with patient at length today\par -begin AC chemotherapy tomorrow C1D1 with neulasta OnPro\par -follow up Dr. Yadav 2-3 weeks with repeat TTE planned, continue new antihypertensives and home BP monitoring per her recommendations\par -MRI Breasts 2/5 earliest available appointment\par -consider repeat CT Chest in 1 month for pulmonary nodules (discussed this result with patient and inconclusive findings regarding nodules, too small to biopsy, interval re-evaluation planned)\par -interval repeat MRI brain planned\par -Reglan PRN eprescribed to patient's pharmacy for nausea, she defers EMLA cream use for Mediport treatments\par -referral to ENT, maxillary polyp and tongue symptoms\par -patient's best contact home number: [phone]\par -RTC 2 weeks with next treatment 2/13, sooner if issues - patient was instructed to go to ER immediately for fever >100.4 or any concerning symptoms, call MD line 24/7 with questions or concerns

## 2019-01-29 NOTE — HISTORY OF PRESENT ILLNESS
[Disease: _____________________] : Disease: [unfilled] [T: ___] : T[unfilled] [N: ___] : N[unfilled] [AJCC Stage: ____] : AJCC Stage: [unfilled] [Treatment Protocol] : Treatment Protocol [de-identified] : 72yo woman presenting for medical oncology consultation.\par \par Last mammogram . She has no history of abnormal breast imaging, breast biopsies, or surgeries.\par \par She saw her PMD who sent her for a screening mammogram after she palpated a mass on the R side of her chest 2018. She had been having body aches/soreness and feeling general malaise since November, despite a course of antibiotics (just after flu shot administered). Also some R shoulder/arm pains.\par \par 18 Mammogram screenin.7cm irregular spiculated mass upper outer right breast 8cm FN with associated architectural distortion and several associated microcalcifications. Partially included enlarged right axillary lymph nodes. BIRADS 0\par \par 18 bilateral breast US: R breast 10:00 7cm FN 2.6cm irregular hypoechoic mass (biopsy recommended). Inferior right axillary lymph node with focal area of eccentric cortical thickening (recommend US guided biopsy). 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 Michael 7/9. 1.4cm involved, DCIS cribiform pattern with high grade nuclear atypia and apocrine cytology very focally present. (coil shaped clip) R axillary lymph node: metastatic ductal carcinoma with apocrine cytology involving a lymph node (hydromark marking clip) ER 0% CO 0% HER2 IHC 0 negative\par \par She saw Dr. Demetrio Robertson yesterday who recommended medical oncology consultation for neoadjuvant chemotherapy. \par \par She noted on intial visit with me some occasional body aches still and fatigue over the last month. She notes an intermittent tongue sensation - like a scald from food (though she does not recall an episode of this). Symptoms intermittent for months. Otherwise she feels well. She is active at baseline, uses stationary bike at home most days for exercise. \par \par 19 PET/CT: Superolateral R breast and R axillary lymph nodes FDG-avid. S/p left nephrectomy. Few subcm b/l pulmonary nodules are nonspecific. Please correlate clinically for infection and follow up with CT chest in 1 month.\par \par TTE 19: EF 64%, GLS -20 Wall motion abnormality in inferolateral wall reported, EKG stable. Patient seen by Dr. Lorrie Yadav (Cardiology) - TTE reviewed without significant wall motion abnormalities, antihypertensives changed and pre-treatment lab work ordered.\par \par MRI Brain w/wo contrast 19: L maxillary polyp v. retention cyst. Non enhancing area of abnormal T2 prolongation involving left thalamic region, nonspecific.\par \par CT Chest 19: No interval changes since 19. Stable 3.3x2.1cm UOQ R breast, 2x1.2cm R axillary lymph node. Stable 4mm and smaller scattered indeterminant pulmonary nodules.\par \par Pertinent History:\par No family history of breast or ovarian cancer.\par HTN - saw Cardiologist 2018, stress test normal per patient, Dr. Samaniego [facility], sees him annually. BP well controlled generally \par HLD \par Single kidney (kidney donor to brother in ) - last Cr 1.32 (18) though patient notes no prior renal issues\par Transaminitis AST 43, ALT 54 (18), no prior history\par BCC of skin removed\par Hysterectomy for fibroid uterus\par Cscope last ~5 years ago, no polyps per patient\par PMD Lance Lafleur\par Single, lives with her son Richard who is here with her today. 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 She is a retired RN. Planning a trip to [facility] with her boyfriend in May. [de-identified] : ER-[place]-HER2- [FreeTextEntry1] : Neoadjuvant Adriamycin 60mg/m2 + Cytoxan 600mg/m2 every 14 days x 4 cycles with Neulasta OnPro support (Day 1), to be followed by Taxol 80mg/m2 weekly for 12 doses [de-identified] : Patient presents today for follow up after staging imaging and treatment planning performed. She notes she received a new antihypertensive regimen from Dr. Yadav today and plans to  these medications and begin today. She has no other complaints; she notes tongue symptoms have improved/resolved this week (they have waxed and waned for years). She feels well and is anxious to begin treatment.

## 2019-01-29 NOTE — HISTORY OF PRESENT ILLNESS
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 EF preserved\par STRESS: No ischemia on EST plain\par \par \par Presents for precancer treatment evaluation, CV risk stratification   and further evaluation of wall motion abnormality before chemotherapy with ACT\par \par She is here with her son today\par \par ROS: Denies Chest pain, dyspnea, palpitations, dizziness or syncope.\par

## 2019-01-29 NOTE — PHYSICAL EXAM
[Fully active, able to carry on all pre-disease performance without restriction] : Status 0 - Fully active, able to carry on all pre-disease performance without restriction [Normal] : supple without JVD, no thyromegaly or masses appreciated [de-identified] : L chest  wall port with steristrips, minimal bruising, nontender

## 2019-01-29 NOTE — PHYSICAL EXAM
[General Appearance - Well Developed] : well developed [Normal Appearance] : normal appearance [Well Groomed] : well groomed [General Appearance - Well Nourished] : well nourished [No Deformities] : no deformities [General Appearance - In No Acute Distress] : no acute distress [Normal Conjunctiva] : the conjunctiva exhibited no abnormalities [Eyelids - No Xanthelasma] : the eyelids demonstrated no xanthelasmas [Normal Oral Mucosa] : normal oral mucosa [No Oral Pallor] : no oral pallor [No Oral Cyanosis] : no oral cyanosis [Normal Jugular Venous A Waves Present] : normal jugular venous A waves present [Normal Jugular Venous V Waves Present] : normal jugular venous V waves present [No Jugular Venous Lopez A Waves] : no jugular venous lopez A waves [Heart Rate And Rhythm] : heart rate and rhythm were normal [Heart Sounds] : normal S1 and S2 [Murmurs] : no murmurs present [Respiration, Rhythm And Depth] : normal respiratory rhythm and effort [Exaggerated Use Of Accessory Muscles For Inspiration] : no accessory muscle use [Auscultation Breath Sounds / Voice Sounds] : lungs were clear to auscultation bilaterally [Abdomen Soft] : soft [Abdomen Tenderness] : non-tender [Abdomen Mass (___ Cm)] : no abdominal mass palpated [Abnormal Walk] : normal gait [Gait - Sufficient For Exercise Testing] : the gait was sufficient for exercise testing [Nail Clubbing] : no clubbing of the fingernails [Cyanosis, Localized] : no localized cyanosis [Petechial Hemorrhages (___cm)] : no petechial hemorrhages [] : no ischemic changes [Skin Color & Pigmentation] : normal skin color and pigmentation [Oriented To Time, Place, And Person] : oriented to person, place, and time

## 2019-01-29 NOTE — ASSESSMENT
[FreeTextEntry1] : 72 yo woman with newly diagnosed triple negative right breast cancer. Presents for evaluation of CV risk and abn echo prior to starting neoadjuvant chemotherapy with ACT. I  reviewed echo and there were no significant wall motion abnormalities strain (GLS - 20) EF64%. EKG stable from remote ekg and Stress was December 2018- with  no ischemia. That said she is still high risk because of HTN, elevated BMI, Mets 6 (low functional), I don’t know her HBA1c or lipids but will check prechemotherapy.  She is encourages to resume stationary bike /exercise.\par for cardio protection, PVCs and HTN, I started coreg on her, and d/arnulfo calcium channel blocker. She will do BP log and titrate up coreg to 25 bid if BP >130/80.  I gave her RX for labs to be drawn pre chemo: pro bnp, crp, trop I, cmp, lipids. \par \par I would like her to be re-echoed to in a 2-3 weeks to make sure LV and RV stable\par echo and f/u in 2-3 weeks\par \par CC: Carla Rodgers MD\par

## 2019-01-30 DIAGNOSIS — Z51.11 ENCOUNTER FOR ANTINEOPLASTIC CHEMOTHERAPY: ICD-10-CM

## 2019-01-30 DIAGNOSIS — Z51.89 ENCOUNTER FOR OTHER SPECIFIED AFTERCARE: ICD-10-CM

## 2019-01-30 DIAGNOSIS — R11.2 NAUSEA WITH VOMITING, UNSPECIFIED: ICD-10-CM

## 2019-01-31 ENCOUNTER — MEDICATION RENEWAL (OUTPATIENT)
Age: 74
End: 2019-01-31

## 2019-01-31 ENCOUNTER — APPOINTMENT (OUTPATIENT)
Dept: INFUSION THERAPY | Facility: HOSPITAL | Age: 74
End: 2019-01-31

## 2019-02-05 ENCOUNTER — APPOINTMENT (OUTPATIENT)
Dept: MRI IMAGING | Facility: IMAGING CENTER | Age: 74
End: 2019-02-05

## 2019-02-05 DIAGNOSIS — R11.2 NAUSEA WITH VOMITING, UNSPECIFIED: ICD-10-CM

## 2019-02-05 DIAGNOSIS — Z51.89 ENCOUNTER FOR OTHER SPECIFIED AFTERCARE: ICD-10-CM

## 2019-02-05 DIAGNOSIS — Z51.11 ENCOUNTER FOR ANTINEOPLASTIC CHEMOTHERAPY: ICD-10-CM

## 2019-02-12 ENCOUNTER — APPOINTMENT (OUTPATIENT)
Dept: SURGICAL ONCOLOGY | Facility: CLINIC | Age: 74
End: 2019-02-12
Payer: MEDICARE

## 2019-02-12 VITALS
HEIGHT: 65 IN | SYSTOLIC BLOOD PRESSURE: 145 MMHG | HEART RATE: 83 BPM | BODY MASS INDEX: 30.99 KG/M2 | DIASTOLIC BLOOD PRESSURE: 86 MMHG | OXYGEN SATURATION: 95 % | WEIGHT: 186 LBS

## 2019-02-12 PROCEDURE — 99024 POSTOP FOLLOW-UP VISIT: CPT

## 2019-02-12 NOTE — ASSESSMENT
[FreeTextEntry1] : 73 Y F with R breast invasive ductal carcinoma with R axillary lymph node metastasis\par \par Plan:\par Pt currently receiving neoadjuvant chemotherapy\par Pt instructed to return to office in 3 months, while completing her chemotherapy regimen to schedule surgery and discuss plan.\par I have discussed the diagnosis, therapeutic plan and options with the patient at length. Patient expressed verbal understanding to proceed with proposed plan. All questions answered.\par

## 2019-02-12 NOTE — CONSULT LETTER
[Dear  ___] : Dear  [unfilled], [Consult Letter:] : I had the pleasure of evaluating your patient, [unfilled]. [( Thank you for referring [unfilled] for consultation for _____ )] : Thank you for referring [unfilled] for consultation for [unfilled] [Please see my note below.] : Please see my note below. [Consult Closing:] : Thank you very much for allowing me to participate in the care of this patient.  If you have any questions, please do not hesitate to contact me. [Sincerely,] : Sincerely, [FreeTextEntry3] : Demetrio Robertson MD, FICS, FACS\par , Surgical Oncology\par [facility]\par [address]\[place]- [address]\par \par [address]\par [place], [place]- [address]\par \par (mob) [phone]\par (o) [phone]\par (f) [phone]\par

## 2019-02-12 NOTE — PHYSICAL EXAM
[Normal] : supple, no neck mass and thyroid not enlarged [Normal Neck Lymph Nodes] : normal neck lymph nodes  [Normal Supraclavicular Lymph Nodes] : normal supraclavicular lymph nodes [Normal Groin Lymph Nodes] : normal groin lymph nodes [Normal Axillary Lymph Nodes] : normal axillary lymph nodes [Normal] : oriented to person, place and time, with appropriate affect [de-identified] : left chest wall mediport in place, scar well healed. No clinical signs of infection

## 2019-02-12 NOTE — HISTORY OF PRESENT ILLNESS
[de-identified] : 73 Y F presents to my office with a complaint of palpable breast mass R breast since Dec 2018, underwent mammogram followed by CNB of R breast mass and R axillary lymph node suggestive of invasive ductal carcinoma with metastatic lymphadenopathy.\par No prior h/o breast biopsy or ca .\par No family h/o breast ca\par Presents today for post op follow up after mediport placement. No complaints. Reports mediport was accessed recently for chemotherapy with no issues

## 2019-02-13 ENCOUNTER — RESULT REVIEW (OUTPATIENT)
Age: 74
End: 2019-02-13

## 2019-02-13 ENCOUNTER — APPOINTMENT (OUTPATIENT)
Dept: CV DIAGNOSITCS | Facility: HOSPITAL | Age: 74
End: 2019-02-13

## 2019-02-13 ENCOUNTER — APPOINTMENT (OUTPATIENT)
Dept: CARDIOLOGY | Facility: CLINIC | Age: 74
End: 2019-02-13
Payer: MEDICARE

## 2019-02-13 ENCOUNTER — OUTPATIENT (OUTPATIENT)
Dept: OUTPATIENT SERVICES | Facility: HOSPITAL | Age: 74
LOS: 1 days | End: 2019-02-13
Payer: MEDICARE

## 2019-02-13 ENCOUNTER — APPOINTMENT (OUTPATIENT)
Dept: INFUSION THERAPY | Facility: HOSPITAL | Age: 74
End: 2019-02-13

## 2019-02-13 ENCOUNTER — LABORATORY RESULT (OUTPATIENT)
Age: 74
End: 2019-02-13

## 2019-02-13 ENCOUNTER — NON-APPOINTMENT (OUTPATIENT)
Age: 74
End: 2019-02-13

## 2019-02-13 ENCOUNTER — APPOINTMENT (OUTPATIENT)
Dept: HEMATOLOGY ONCOLOGY | Facility: CLINIC | Age: 74
End: 2019-02-13
Payer: MEDICARE

## 2019-02-13 VITALS
SYSTOLIC BLOOD PRESSURE: 114 MMHG | DIASTOLIC BLOOD PRESSURE: 74 MMHG | TEMPERATURE: 98.3 F | RESPIRATION RATE: 16 BRPM | WEIGHT: 211.64 LBS | HEART RATE: 82 BPM | OXYGEN SATURATION: 97 % | BODY MASS INDEX: 35.22 KG/M2

## 2019-02-13 VITALS
DIASTOLIC BLOOD PRESSURE: 90 MMHG | SYSTOLIC BLOOD PRESSURE: 132 MMHG | WEIGHT: 190 LBS | HEART RATE: 70 BPM | BODY MASS INDEX: 31.65 KG/M2 | HEIGHT: 65 IN | OXYGEN SATURATION: 95 %

## 2019-02-13 DIAGNOSIS — Z90.711 ACQUIRED ABSENCE OF UTERUS WITH REMAINING CERVICAL STUMP: Chronic | ICD-10-CM

## 2019-02-13 DIAGNOSIS — C50.919 MALIGNANT NEOPLASM OF UNSPECIFIED SITE OF UNSPECIFIED FEMALE BREAST: ICD-10-CM

## 2019-02-13 DIAGNOSIS — Z98.891 HISTORY OF UTERINE SCAR FROM PREVIOUS SURGERY: Chronic | ICD-10-CM

## 2019-02-13 DIAGNOSIS — Z90.5 ACQUIRED ABSENCE OF KIDNEY: Chronic | ICD-10-CM

## 2019-02-13 LAB
BASOPHILS # BLD AUTO: 0 K/UL — SIGNIFICANT CHANGE UP (ref 0–0.2)
BASOPHILS NFR BLD AUTO: 0.4 % — SIGNIFICANT CHANGE UP (ref 0–2)
EOSINOPHIL # BLD AUTO: 0.1 K/UL — SIGNIFICANT CHANGE UP (ref 0–0.5)
EOSINOPHIL NFR BLD AUTO: 1.2 % — SIGNIFICANT CHANGE UP (ref 0–6)
HCT VFR BLD CALC: 33.2 % — LOW (ref 34.5–45)
HGB BLD-MCNC: 11.7 G/DL — SIGNIFICANT CHANGE UP (ref 11.5–15.5)
LYMPHOCYTES # BLD AUTO: 1.7 K/UL — SIGNIFICANT CHANGE UP (ref 1–3.3)
LYMPHOCYTES # BLD AUTO: 16.4 % — SIGNIFICANT CHANGE UP (ref 13–44)
MCHC RBC-ENTMCNC: 30.7 PG — SIGNIFICANT CHANGE UP (ref 27–34)
MCHC RBC-ENTMCNC: 35.1 G/DL — SIGNIFICANT CHANGE UP (ref 32–36)
MCV RBC AUTO: 87.5 FL — SIGNIFICANT CHANGE UP (ref 80–100)
MONOCYTES # BLD AUTO: 0.8 K/UL — SIGNIFICANT CHANGE UP (ref 0–0.9)
MONOCYTES NFR BLD AUTO: 7.2 % — SIGNIFICANT CHANGE UP (ref 2–14)
NEUTROPHILS # BLD AUTO: 7.8 K/UL — HIGH (ref 1.8–7.4)
NEUTROPHILS NFR BLD AUTO: 74.8 % — SIGNIFICANT CHANGE UP (ref 43–77)
PLATELET # BLD AUTO: 286 K/UL — SIGNIFICANT CHANGE UP (ref 150–400)
RBC # BLD: 3.8 M/UL — SIGNIFICANT CHANGE UP (ref 3.8–5.2)
RBC # FLD: 12.4 % — SIGNIFICANT CHANGE UP (ref 10.3–14.5)
WBC # BLD: 10.4 K/UL — SIGNIFICANT CHANGE UP (ref 3.8–10.5)
WBC # FLD AUTO: 10.4 K/UL — SIGNIFICANT CHANGE UP (ref 3.8–10.5)

## 2019-02-13 PROCEDURE — 0399T: CPT

## 2019-02-13 PROCEDURE — 99214 OFFICE O/P EST MOD 30 MIN: CPT

## 2019-02-13 PROCEDURE — 93000 ELECTROCARDIOGRAM COMPLETE: CPT

## 2019-02-13 PROCEDURE — 93356 MYOCRD STRAIN IMG SPCKL TRCK: CPT

## 2019-02-13 PROCEDURE — 93306 TTE W/DOPPLER COMPLETE: CPT | Mod: 26

## 2019-02-13 PROCEDURE — 93306 TTE W/DOPPLER COMPLETE: CPT

## 2019-02-13 NOTE — ASSESSMENT
[FreeTextEntry1] : 74yo woman with history of HTN on multiple medications, kidney donor/CKD, ER-[place]-HER2-, lymph-node positive right breast cancer anatomic stage IIB, AJCC 8th edition clinical prognostic stage IIIB, on neoadjuvant chemotherapy (dose-dense AC-->T) presenting for follow up. She is doing well after first cycle with minimal complaints.\par \par -continue chemotherapy C2D1 with neulasta OnPro, check FS CBC then CMP\par -follow up Dr. Yadav 2-3 weeks with repeat TTE planned, continue new antihypertensives and home BP monitoring per her recommendations\par -MRI Breasts pending; patient rescheduled\par -consider repeat CT Chest in 1 month for pulmonary nodules (discussed this result with patient and inconclusive findings regarding nodules, too small to biopsy, interval re-evaluation planned)\par -interval repeat MRI brain planned\par -Reglan PRN eprescribed to patient's pharmacy for nausea, EMLA cream use for Mediport treatments\par -referral to ENT, maxillary polyp and tongue symptoms pending scheduling\par -patient's best contact home number: [phone]\par -RTC 2 weeks with next treatment, sooner if issues - patient was instructed to go to ER immediately for fever >100.4 or any concerning symptoms, call MD line 24/7 with questions or concerns

## 2019-02-13 NOTE — HISTORY OF PRESENT ILLNESS
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 EF preserved\par STRESS: No ischemia on EST plain\par \par \par Presents for precancer treatment evaluation, CV risk stratification   and further evaluation of wall motion abnormality before chemotherapy with ACT\par \par She is here with her son today\par \par ROS: Denies Chest pain, dyspnea, palpitations, dizziness or syncope.\par \par Interval Note\par February 13, 2109\par \par Patient returns for a follow up cardiac evaluation. She underwent a repeat echocardiogram today which was stable and unchanged. \par \par Chemotherapy cycle #2/4 planned for today: Adriamycin 60 mg/ m2 and Cytoxan every 14 days.\par \par June 4 ,2019 meeting surgeon, Dr. Demetrio Robertson  for bilateral mastectomy.\par \par Blood pressure is normotensive. EKG is stable and unchanged. \par

## 2019-02-13 NOTE — ASSESSMENT
[FreeTextEntry1] : 73 year old female with newly diagnosed right sided breast cancer with history of hypertension and hyperlipidemia. \par Repeat echo today is stable and unchanged.\par \par PLAN\par Continue with Coreg 25 mg BID for cardioprotection and blood pressure management\par Initiated Rosuvastatin for cholesterol treatment. Recent lipid profile demonstrated elevated LDL and total cholesterol. \par \par Follow up and repeat * echo with strain imaging after completion of 4th neoadjuvant chemo cycle.

## 2019-02-13 NOTE — PHYSICAL EXAM
[General Appearance - Well Developed] : well developed [Normal Appearance] : normal appearance [Well Groomed] : well groomed [General Appearance - Well Nourished] : well nourished [No Deformities] : no deformities [General Appearance - In No Acute Distress] : no acute distress [Normal Conjunctiva] : the conjunctiva exhibited no abnormalities [Eyelids - No Xanthelasma] : the eyelids demonstrated no xanthelasmas [Normal Oral Mucosa] : normal oral mucosa [No Oral Pallor] : no oral pallor [No Oral Cyanosis] : no oral cyanosis [Normal Jugular Venous A Waves Present] : normal jugular venous A waves present [Normal Jugular Venous V Waves Present] : normal jugular venous V waves present [No Jugular Venous Lopez A Waves] : no jugular venous lopez A waves [Respiration, Rhythm And Depth] : normal respiratory rhythm and effort [Exaggerated Use Of Accessory Muscles For Inspiration] : no accessory muscle use [Auscultation Breath Sounds / Voice Sounds] : lungs were clear to auscultation bilaterally [Abdomen Soft] : soft [Abdomen Tenderness] : non-tender [Abdomen Mass (___ Cm)] : no abdominal mass palpated [Abnormal Walk] : normal gait [Gait - Sufficient For Exercise Testing] : the gait was sufficient for exercise testing [Nail Clubbing] : no clubbing of the fingernails [Cyanosis, Localized] : no localized cyanosis [Petechial Hemorrhages (___cm)] : no petechial hemorrhages [] : no ischemic changes [Skin Color & Pigmentation] : normal skin color and pigmentation [Oriented To Time, Place, And Person] : oriented to person, place, and time

## 2019-02-13 NOTE — PHYSICAL EXAM
[Fully active, able to carry on all pre-disease performance without restriction] : Status 0 - Fully active, able to carry on all pre-disease performance without restriction [Normal] : supple without JVD, no thyromegaly or masses appreciated [de-identified] : R breast with thickening ~10' o clock, no alfonso mass palpable. no masses or adenopathy palpated b/l [de-identified] : L chest wall port c/d/i well healed nontender, EMLA cream in place. faint raised follicles on chest - healing

## 2019-02-13 NOTE — HISTORY OF PRESENT ILLNESS
[Disease: _____________________] : Disease: [unfilled] [T: ___] : T[unfilled] [N: ___] : N[unfilled] [AJCC Stage: ____] : AJCC Stage: [unfilled] [Treatment Protocol] : Treatment Protocol [de-identified] : 72yo woman presenting for medical oncology consultation.\par \par Last mammogram . She has no history of abnormal breast imaging, breast biopsies, or surgeries.\par \par She saw her PMD who sent her for a screening mammogram after she palpated a mass on the R side of her chest 2018. She had been having body aches/soreness and feeling general malaise since November, despite a course of antibiotics (just after flu shot administered). Also some R shoulder/arm pains.\par \par 18 Mammogram screenin.7cm irregular spiculated mass upper outer right breast 8cm FN with associated architectural distortion and several associated microcalcifications. Partially included enlarged right axillary lymph nodes. BIRADS 0\par \par 18 bilateral breast US: R breast 10:00 7cm FN 2.6cm irregular hypoechoic mass (biopsy recommended). Inferior right axillary lymph node with focal area of eccentric cortical thickening (recommend US guided biopsy). 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 Michael 7/9. 1.4cm involved, DCIS cribiform pattern with high grade nuclear atypia and apocrine cytology very focally present. (coil shaped clip) R axillary lymph node: metastatic ductal carcinoma with apocrine cytology involving a lymph node (hydromark marking clip) ER 0% TN 0% HER2 IHC 0 negative\par \par She saw Dr. Demetrio Robertson yesterday who recommended medical oncology consultation for neoadjuvant chemotherapy. \par \par She noted on intial visit with me some occasional body aches still and fatigue over the last month. She notes an intermittent tongue sensation - like a scald from food (though she does not recall an episode of this). Symptoms intermittent for months. Otherwise she feels well. She is active at baseline, uses stationary bike at home most days for exercise. \par \par 19 PET/CT: Superolateral R breast and R axillary lymph nodes FDG-avid. S/p left nephrectomy. Few subcm b/l pulmonary nodules are nonspecific. Please correlate clinically for infection and follow up with CT chest in 1 month.\par \par TTE 19: EF 64%, GLS -20 Wall motion abnormality in inferolateral wall reported, EKG stable. Patient seen by Dr. Lorrie Yadav (Cardiology) - TTE reviewed without significant wall motion abnormalities, antihypertensives changed \par \par MRI Brain w/wo contrast 19: L maxillary polyp v. retention cyst. Non enhancing area of abnormal T2 prolongation involving left thalamic region, nonspecific.\par \par CT Chest 19: No interval changes since 19. Stable 3.3x2.1cm UOQ R breast, 2x1.2cm R axillary lymph node. Stable 4mm and smaller scattered indeterminant pulmonary nodules.\par \par She started neoadjuvant chemotherapy with AC on 19 \par \par Pertinent History:\par No family history of breast or ovarian cancer.\par HTN - saw Cardiologist 2018, stress test normal per patient, Dr. Samaniego [facility], sees him annually. BP well controlled generally \par HLD \par Single kidney (kidney donor to brother in ) - last Cr 1.32 (18) though patient notes no prior renal issues\par Transaminitis AST 43, ALT 54 (18), no prior history\par BCC of skin removed\par Hysterectomy for fibroid uterus\par Cscope last ~5 years ago, no polyps per patient\par PMD Lance Lafleur\par Single, lives with her son Richard who is here with her today. 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 [de-identified] : ER-[place]-HER2- [FreeTextEntry1] : Neoadjuvant Adriamycin 60mg/m2 + Cytoxan 600mg/m2 every 14 days x 4 cycles with Neulasta OnPro support (Day 1), to be followed by Taxol 80mg/m2 weekly for 12 doses [de-identified] : Patient presents today for follow up after C1 of AC with Neulasta OnPro. She notes she saw Dr. Yadav today; Coreg dose increased and beginning crestor for cholesterol. She has no other complaints; she notes tongue symptoms have improved/resolved since just prior to initiation of chemotherapy (they have waxed and waned for years). She completed tamiflu course and has not had any fevers or symptoms of illness; her granddaughter is improved and returned to school. On ROS she notes some small follicles raised on chest when sweating in pajamas at night; improved with topical argan oil.

## 2019-02-25 ENCOUNTER — RX RENEWAL (OUTPATIENT)
Age: 74
End: 2019-02-25

## 2019-02-27 ENCOUNTER — APPOINTMENT (OUTPATIENT)
Dept: INFUSION THERAPY | Facility: HOSPITAL | Age: 74
End: 2019-02-27

## 2019-02-27 ENCOUNTER — LABORATORY RESULT (OUTPATIENT)
Age: 74
End: 2019-02-27

## 2019-02-27 ENCOUNTER — APPOINTMENT (OUTPATIENT)
Dept: HEMATOLOGY ONCOLOGY | Facility: CLINIC | Age: 74
End: 2019-02-27
Payer: MEDICARE

## 2019-02-27 ENCOUNTER — RESULT REVIEW (OUTPATIENT)
Age: 74
End: 2019-02-27

## 2019-02-27 VITALS
SYSTOLIC BLOOD PRESSURE: 124 MMHG | OXYGEN SATURATION: 99 % | TEMPERATURE: 98 F | DIASTOLIC BLOOD PRESSURE: 78 MMHG | RESPIRATION RATE: 16 BRPM | WEIGHT: 198.86 LBS | BODY MASS INDEX: 33.09 KG/M2 | HEART RATE: 91 BPM

## 2019-02-27 LAB
BASOPHILS # BLD AUTO: 0 K/UL — SIGNIFICANT CHANGE UP (ref 0–0.2)
BASOPHILS NFR BLD AUTO: 0.1 % — SIGNIFICANT CHANGE UP (ref 0–2)
EOSINOPHIL # BLD AUTO: 0 K/UL — SIGNIFICANT CHANGE UP (ref 0–0.5)
EOSINOPHIL NFR BLD AUTO: 0.3 % — SIGNIFICANT CHANGE UP (ref 0–6)
HCT VFR BLD CALC: 30.7 % — LOW (ref 34.5–45)
HGB BLD-MCNC: 10.5 G/DL — LOW (ref 11.5–15.5)
LYMPHOCYTES # BLD AUTO: 1.7 K/UL — SIGNIFICANT CHANGE UP (ref 1–3.3)
LYMPHOCYTES # BLD AUTO: 12.5 % — LOW (ref 13–44)
MCHC RBC-ENTMCNC: 29.7 PG — SIGNIFICANT CHANGE UP (ref 27–34)
MCHC RBC-ENTMCNC: 34.1 G/DL — SIGNIFICANT CHANGE UP (ref 32–36)
MCV RBC AUTO: 87.1 FL — SIGNIFICANT CHANGE UP (ref 80–100)
MONOCYTES # BLD AUTO: 1.1 K/UL — HIGH (ref 0–0.9)
MONOCYTES NFR BLD AUTO: 8.2 % — SIGNIFICANT CHANGE UP (ref 2–14)
NEUTROPHILS # BLD AUTO: 10.7 K/UL — HIGH (ref 1.8–7.4)
NEUTROPHILS NFR BLD AUTO: 79 % — HIGH (ref 43–77)
PLATELET # BLD AUTO: 202 K/UL — SIGNIFICANT CHANGE UP (ref 150–400)
RBC # BLD: 3.53 M/UL — LOW (ref 3.8–5.2)
RBC # FLD: 12.9 % — SIGNIFICANT CHANGE UP (ref 10.3–14.5)
WBC # BLD: 13.6 K/UL — HIGH (ref 3.8–10.5)
WBC # FLD AUTO: 13.6 K/UL — HIGH (ref 3.8–10.5)

## 2019-02-27 PROCEDURE — 99214 OFFICE O/P EST MOD 30 MIN: CPT

## 2019-02-27 NOTE — PHYSICAL EXAM
[Fully active, able to carry on all pre-disease performance without restriction] : Status 0 - Fully active, able to carry on all pre-disease performance without restriction [Normal] : supple without JVD, no thyromegaly or masses appreciated [de-identified] : R breast with underfined thickening ~10' o clock, no alfonso mass palpable. no masses or adenopathy palpated b/l (stable/unchanged) [de-identified] : L chest wall port c/d/i well healed nontender, slight erythema of skin, EMLA cream in place. faint raised follicles on chest - healing

## 2019-02-27 NOTE — HISTORY OF PRESENT ILLNESS
[Disease: _____________________] : Disease: [unfilled] [T: ___] : T[unfilled] [N: ___] : N[unfilled] [AJCC Stage: ____] : AJCC Stage: [unfilled] [de-identified] : 74yo woman presenting for medical oncology consultation.\par \par Last mammogram . She has no history of abnormal breast imaging, breast biopsies, or surgeries.\par \par She saw her PMD who sent her for a screening mammogram after she palpated a mass on the R side of her chest 2018. She had been having body aches/soreness and feeling general malaise since November, despite a course of antibiotics (just after flu shot administered). Also some R shoulder/arm pains.\par \par 18 Mammogram screenin.7cm irregular spiculated mass upper outer right breast 8cm FN with associated architectural distortion and several associated microcalcifications. Partially included enlarged right axillary lymph nodes. BIRADS 0\par \par 18 bilateral breast US: R breast 10:00 7cm FN 2.6cm irregular hypoechoic mass (biopsy recommended). Inferior right axillary lymph node with focal area of eccentric cortical thickening (recommend US guided biopsy). 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 Michael 7/9. 1.4cm involved, DCIS cribiform pattern with high grade nuclear atypia and apocrine cytology very focally present. (coil shaped clip) R axillary lymph node: metastatic ductal carcinoma with apocrine cytology involving a lymph node (hydromark marking clip) ER 0% SD 0% HER2 IHC 0 negative\par \par She saw Dr. Demetrio Robertson yesterday who recommended medical oncology consultation for neoadjuvant chemotherapy. \par \par She noted on intial visit with me some occasional body aches still and fatigue over the last month. She notes an intermittent tongue sensation - like a scald from food (though she does not recall an episode of this). Symptoms intermittent for months. Otherwise she feels well. She is active at baseline, uses stationary bike at home most days for exercise. \par \par 19 PET/CT: Superolateral R breast and R axillary lymph nodes FDG-avid. S/p left nephrectomy. Few subcm b/l pulmonary nodules are nonspecific. Please correlate clinically for infection and follow up with CT chest in 1 month.\par \par TTE 19: EF 64%, GLS -20 Wall motion abnormality in inferolateral wall reported, EKG stable. Patient seen by Dr. Lorrie Yadav (Cardiology) - TTE reviewed without significant wall motion abnormalities, antihypertensives changed \par \par MRI Brain w/wo contrast 19: L maxillary polyp v. retention cyst. Non enhancing area of abnormal T2 prolongation involving left thalamic region, nonspecific.\par \par CT Chest 19: No interval changes since 19. Stable 3.3x2.1cm UOQ R breast, 2x1.2cm R axillary lymph node. Stable 4mm and smaller scattered indeterminant pulmonary nodules.\par \par She started neoadjuvant chemotherapy with AC on 19 \par \par Pertinent History:\par No family history of breast or ovarian cancer.\par HTN - saw Cardiologist 2018, stress test normal per patient, Dr. Samaniego [facility], sees him annually. BP well controlled generally \par HLD \par Single kidney (kidney donor to brother in ) - last Cr 1.32 (18) though patient notes no prior renal issues\par Transaminitis AST 43, ALT 54 (18), no prior history\par BCC of skin removed\par Hysterectomy for fibroid uterus\par Cscope last ~5 years ago, no polyps per patient\par PMD Lance Lafleur\par Single, lives with her son Richard who is here with her today. 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 [de-identified] : ER-[place]-HER2- [Treatment Protocol] : Treatment Protocol [FreeTextEntry1] : Neoadjuvant Adriamycin 60mg/m2 + Cytoxan 600mg/m2 every 14 days x 4 cycles with Neulasta OnPro support (Day 1), to be followed by Taxol 80mg/m2 weekly for 12 doses [de-identified] : Patient presents today for follow up after C2 of AC with Neulasta OnPro. She used reglan a few times this cycle for some queasiness without vomiting, with relief. She notes BP has been well controlled. She had some scalp sensitivity in the cold weather since losing her hair - she used argan oil and felt better, also getting a wig this week. She has had some sinus congestion and pain - using Coridicin without tylenol 2x per day, flonase daily with improvement/relief. She denies fevers, chills, cough, HA, dizziness, poor PO intake, diarrhea, abd pain, edema. On ROS she again notes some small follicles raised on chest when sweating in pajamas at night; improved with topical argan oil. She has no other complaints and notes good energy.

## 2019-02-27 NOTE — ASSESSMENT
[FreeTextEntry1] : 74yo woman with history of HTN on multiple medications, kidney donor/CKD, ER-[place]-HER2-, lymph-node positive right breast cancer anatomic stage IIB, AJCC 8th edition clinical prognostic stage IIIB, on neoadjuvant chemotherapy (dose-dense AC-->T) presenting for follow up. She is doing well after 2nd cycle with minimal complaints. Some recent URI symptoms improving with supportive care.\par \par -continue chemotherapy C3D1 with neulasta OnPro, check FS CBC (OK today) then CMP\par -follow up Dr. Yaadv 2-3 weeks with repeat TTE planned, continue new antihypertensives and home BP monitoring per her recommendations (BP well controlled currently)\par -MRI Breasts pending; discussed with patient today - she notes that she is planning b/l mastectomy surgery, so will defer MRI Breasts\par -consider repeat CT Chest at next visit for pulmonary nodules (discussed this result with patient and inconclusive findings regarding nodules, too small to biopsy, interval re-evaluation planned)\par -interval repeat MRI brain planned\par -Reglan PRN eprescribed to patient's pharmacy for nausea (refilled today), EMLA cream use for Mediport treatments\par -referral to ENT, maxillary polyp pending scheduling\par -advised she continue supportive meds, hydration, if URI symptoms persist past 1-2 days she was instructed to call me for further assessment\par -patient's best contact home number: [phone]\par -RTC 2 weeks with next treatment, sooner if issues - patient was instructed to go to ER immediately for fever >100.4 or any concerning symptoms, call MD line 24/7 with questions or concerns

## 2019-03-04 ENCOUNTER — OTHER (OUTPATIENT)
Age: 74
End: 2019-03-04

## 2019-03-04 ENCOUNTER — OUTPATIENT (OUTPATIENT)
Dept: OUTPATIENT SERVICES | Facility: HOSPITAL | Age: 74
LOS: 1 days | Discharge: HOME | End: 2019-03-04

## 2019-03-04 DIAGNOSIS — Z98.891 HISTORY OF UTERINE SCAR FROM PREVIOUS SURGERY: Chronic | ICD-10-CM

## 2019-03-04 DIAGNOSIS — Z90.711 ACQUIRED ABSENCE OF UTERUS WITH REMAINING CERVICAL STUMP: Chronic | ICD-10-CM

## 2019-03-04 DIAGNOSIS — Z90.5 ACQUIRED ABSENCE OF KIDNEY: Chronic | ICD-10-CM

## 2019-03-04 DIAGNOSIS — C50.919 MALIGNANT NEOPLASM OF UNSPECIFIED SITE OF UNSPECIFIED FEMALE BREAST: ICD-10-CM

## 2019-03-12 ENCOUNTER — FORM ENCOUNTER (OUTPATIENT)
Age: 74
End: 2019-03-12

## 2019-03-13 ENCOUNTER — OUTPATIENT (OUTPATIENT)
Dept: OUTPATIENT SERVICES | Facility: HOSPITAL | Age: 74
LOS: 1 days | End: 2019-03-13
Payer: MEDICARE

## 2019-03-13 ENCOUNTER — RESULT REVIEW (OUTPATIENT)
Age: 74
End: 2019-03-13

## 2019-03-13 ENCOUNTER — APPOINTMENT (OUTPATIENT)
Dept: HEMATOLOGY ONCOLOGY | Facility: CLINIC | Age: 74
End: 2019-03-13
Payer: MEDICARE

## 2019-03-13 ENCOUNTER — LABORATORY RESULT (OUTPATIENT)
Age: 74
End: 2019-03-13

## 2019-03-13 ENCOUNTER — APPOINTMENT (OUTPATIENT)
Dept: INFUSION THERAPY | Facility: HOSPITAL | Age: 74
End: 2019-03-13

## 2019-03-13 ENCOUNTER — APPOINTMENT (OUTPATIENT)
Dept: RADIOLOGY | Facility: IMAGING CENTER | Age: 74
End: 2019-03-13
Payer: MEDICARE

## 2019-03-13 VITALS
RESPIRATION RATE: 16 BRPM | DIASTOLIC BLOOD PRESSURE: 84 MMHG | SYSTOLIC BLOOD PRESSURE: 126 MMHG | TEMPERATURE: 98.5 F | HEART RATE: 82 BPM | WEIGHT: 198.99 LBS | BODY MASS INDEX: 33.12 KG/M2 | OXYGEN SATURATION: 97 %

## 2019-03-13 DIAGNOSIS — Z98.891 HISTORY OF UTERINE SCAR FROM PREVIOUS SURGERY: Chronic | ICD-10-CM

## 2019-03-13 DIAGNOSIS — R05 COUGH: ICD-10-CM

## 2019-03-13 DIAGNOSIS — Z90.5 ACQUIRED ABSENCE OF KIDNEY: Chronic | ICD-10-CM

## 2019-03-13 DIAGNOSIS — Z90.711 ACQUIRED ABSENCE OF UTERUS WITH REMAINING CERVICAL STUMP: Chronic | ICD-10-CM

## 2019-03-13 LAB
BASOPHILS # BLD AUTO: 0 K/UL — SIGNIFICANT CHANGE UP (ref 0–0.2)
BASOPHILS NFR BLD AUTO: 0.1 % — SIGNIFICANT CHANGE UP (ref 0–2)
EOSINOPHIL # BLD AUTO: 0.1 K/UL — SIGNIFICANT CHANGE UP (ref 0–0.5)
EOSINOPHIL NFR BLD AUTO: 0.6 % — SIGNIFICANT CHANGE UP (ref 0–6)
HCT VFR BLD CALC: 26.7 % — LOW (ref 34.5–45)
HGB BLD-MCNC: 9.6 G/DL — LOW (ref 11.5–15.5)
LYMPHOCYTES # BLD AUTO: 0.9 K/UL — LOW (ref 1–3.3)
LYMPHOCYTES # BLD AUTO: 6.7 % — LOW (ref 13–44)
MCHC RBC-ENTMCNC: 31.1 PG — SIGNIFICANT CHANGE UP (ref 27–34)
MCHC RBC-ENTMCNC: 35.8 G/DL — SIGNIFICANT CHANGE UP (ref 32–36)
MCV RBC AUTO: 86.9 FL — SIGNIFICANT CHANGE UP (ref 80–100)
MONOCYTES # BLD AUTO: 0.9 K/UL — SIGNIFICANT CHANGE UP (ref 0–0.9)
MONOCYTES NFR BLD AUTO: 6.4 % — SIGNIFICANT CHANGE UP (ref 2–14)
NEUTROPHILS # BLD AUTO: 12.1 K/UL — HIGH (ref 1.8–7.4)
NEUTROPHILS NFR BLD AUTO: 86.3 % — HIGH (ref 43–77)
PLATELET # BLD AUTO: 252 K/UL — SIGNIFICANT CHANGE UP (ref 150–400)
RBC # BLD: 3.08 M/UL — LOW (ref 3.8–5.2)
RBC # FLD: 13.3 % — SIGNIFICANT CHANGE UP (ref 10.3–14.5)
WBC # BLD: 14 K/UL — HIGH (ref 3.8–10.5)
WBC # FLD AUTO: 14 K/UL — HIGH (ref 3.8–10.5)

## 2019-03-13 PROCEDURE — 71046 X-RAY EXAM CHEST 2 VIEWS: CPT | Mod: 26

## 2019-03-13 PROCEDURE — 71046 X-RAY EXAM CHEST 2 VIEWS: CPT

## 2019-03-13 PROCEDURE — 99215 OFFICE O/P EST HI 40 MIN: CPT

## 2019-03-13 NOTE — ASSESSMENT
[FreeTextEntry1] : 72yo woman with history of HTN on multiple medications, kidney donor/CKD, ER-[place]-HER2-, lymph-node positive right breast cancer anatomic stage IIB, AJCC 8th edition clinical prognostic stage IIIB, on neoadjuvant chemotherapy (dose-dense AC-->T) presenting for follow up. She is doing well after 3rd cycle with minimal complaints. Some recent URI symptoms improving with supportive care.\par \par Breast Cancer:\par -continue chemotherapy C4D1 with neulasta OnPro, check FS CBC (OK today) then CMP\par -MRI Breasts deferred; she notes that she is planning b/l mastectomy surgery\par -consider repeat CT Chest in near future for pulmonary nodules (discussed this result with patient and inconclusive findings regarding nodules, too small to biopsy, interval re-evaluation planned)\par -Reglan PRN eprescribed to patient's pharmacy for nausea, EMLA cream use for Mediport treatments\par -interval repeat MRI brain planned\par -patient's best contact home number: [phone]\par -RTC 2 weeks with next treatment, sooner if issues - patient was instructed to go to ER immediately for fever >100.4 or any concerning symptoms, call MD line 24/7 with questions or concerns\par \par HTN: \par -follow up Dr. Yadav 3/27 with repeat TTE planned\par -continue new antihypertensives and home BP monitoring per her recommendations (BP well controlled currently)\par \par URI symptoms: overall improving\par -trial of tessalon perles - she has taken this in the past with some success\par -check CXR today\par -referral to ENT, maxillary polyp incidentally noted on MRI Brain (has hx recurrent sinusitis) - pending scheduling\par -advised she continue supportive meds, hydration, for post-viral cough, exam clear\par

## 2019-03-13 NOTE — HISTORY OF PRESENT ILLNESS
[Disease: _____________________] : Disease: [unfilled] [T: ___] : T[unfilled] [N: ___] : N[unfilled] [AJCC Stage: ____] : AJCC Stage: [unfilled] [Treatment Protocol] : Treatment Protocol [de-identified] : 74yo woman presenting for medical oncology consultation.\par \par Last mammogram . She has no history of abnormal breast imaging, breast biopsies, or surgeries.\par \par She saw her PMD who sent her for a screening mammogram after she palpated a mass on the R side of her chest 2018. She had been having body aches/soreness and feeling general malaise since November, despite a course of antibiotics (just after flu shot administered). Also some R shoulder/arm pains.\par \par 18 Mammogram screenin.7cm irregular spiculated mass upper outer right breast 8cm FN with associated architectural distortion and several associated microcalcifications. Partially included enlarged right axillary lymph nodes. BIRADS 0\par \par 18 bilateral breast US: R breast 10:00 7cm FN 2.6cm irregular hypoechoic mass (biopsy recommended). Inferior right axillary lymph node with focal area of eccentric cortical thickening (recommend US guided biopsy). 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 Michael 7/9. 1.4cm involved, DCIS cribiform pattern with high grade nuclear atypia and apocrine cytology very focally present. (coil shaped clip) R axillary lymph node: metastatic ductal carcinoma with apocrine cytology involving a lymph node (hydromark marking clip) ER 0% [place] 0% HER2 IHC 0 negative\par \par She saw Dr. Demetrio Robertson yesterday who recommended medical oncology consultation for neoadjuvant chemotherapy. \par \par She noted on intial visit with me some occasional body aches still and fatigue over the last month. She notes an intermittent tongue sensation - like a scald from food (though she does not recall an episode of this). Symptoms intermittent for months. Otherwise she feels well. She is active at baseline, uses stationary bike at home most days for exercise. \par \par 19 PET/CT: Superolateral R breast and R axillary lymph nodes FDG-avid. S/p left nephrectomy. Few subcm b/l pulmonary nodules are nonspecific. Please correlate clinically for infection and follow up with CT chest in 1 month.\par \par TTE 19: EF 64%, GLS -20 Wall motion abnormality in inferolateral wall reported, EKG stable. Patient seen by Dr. Lorrie Yadav (Cardiology) - TTE reviewed without significant wall motion abnormalities, antihypertensives changed \par \par MRI Brain w/wo contrast 19: L maxillary polyp v. retention cyst. Non enhancing area of abnormal T2 prolongation involving left thalamic region, nonspecific.\par \par CT Chest 19: No interval changes since 19. Stable 3.3x2.1cm UOQ R breast, 2x1.2cm R axillary lymph node. Stable 4mm and smaller scattered indeterminant pulmonary nodules.\par \par She started neoadjuvant chemotherapy with AC on 19 \par \par Pertinent History:\par No family history of breast or ovarian cancer.\par HTN - saw Cardiologist 2018, stress test normal per patient, Dr. Samaniego [facility], sees him annually. BP well controlled generally \par HLD \par Single kidney (kidney donor to brother in ) - last Cr 1.32 (18) though patient notes no prior renal issues\par Transaminitis AST 43, ALT 54 (18), no prior history\par BCC of skin removed\par Hysterectomy for fibroid uterus\par Cscope last ~5 years ago, no polyps per patient\par PMD Lance Lafleur\par Single, lives with her son Richard who is here with her today. 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 [de-identified] : ER-[place]-HER2- [FreeTextEntry1] : Neoadjuvant Adriamycin 60mg/m2 + Cytoxan 600mg/m2 every 14 days x 4 cycles with Neulasta OnPro support (Day 1), to be followed by Taxol 80mg/m2 weekly for 12 doses [de-identified] : Patient presents today for follow up after C3 of AC with Neulasta OnPro. She again used reglan a few times this cycle for some queasiness without vomiting, with relief. She notes BP has been well controlled. She has a lingering cough - using coricidin in the daytime and Hycodan PRN only at night with some relief and overall improving day by day. She notes she developed a fungal rash in her left breast fold that is resolved with topical fungal powder that she already had at home. In the bathtub today she thinks she felt a bump near her vagina that was slightly itchy thereafter. Some mouth irritation without alfonso ulcerations - intermittent, better with biotene mouthwashes. She is eating somewhat well but overall less. She denies fevers, chills, cough, HA, dizziness, poor PO intake, diarrhea, abd pain, edema. She has no other complaints and notes good energy.

## 2019-03-13 NOTE — PHYSICAL EXAM
[Fully active, able to carry on all pre-disease performance without restriction] : Status 0 - Fully active, able to carry on all pre-disease performance without restriction [Normal] : affect appropriate [de-identified] : R breast with underfined thickening ~10' o clock, no alfonso mass palpable. no masses or adenopathy palpated b/l (stable/unchanged). L breast fold very mild erythema noted confluent [de-identified] : dry skin in b/l vaginal folds without palpable masses or lesions [de-identified] : L chest wall port c/d/i well healed nontender, slight erythema of skin stable, EMLA cream in place. faint raised follicles on chest - healing, small dry patches on skin b/l legs

## 2019-03-14 DIAGNOSIS — R11.2 NAUSEA WITH VOMITING, UNSPECIFIED: ICD-10-CM

## 2019-03-14 DIAGNOSIS — Z51.89 ENCOUNTER FOR OTHER SPECIFIED AFTERCARE: ICD-10-CM

## 2019-03-14 DIAGNOSIS — Z51.11 ENCOUNTER FOR ANTINEOPLASTIC CHEMOTHERAPY: ICD-10-CM

## 2019-03-19 ENCOUNTER — APPOINTMENT (OUTPATIENT)
Dept: MRI IMAGING | Facility: IMAGING CENTER | Age: 74
End: 2019-03-19

## 2019-03-20 DIAGNOSIS — T82.598A OTHER MECHANICAL COMPLICATION OF OTHER CARDIAC AND VASCULAR DEVICES AND IMPLANTS, INITIAL ENCOUNTER: ICD-10-CM

## 2019-03-26 ENCOUNTER — CLINICAL ADVICE (OUTPATIENT)
Age: 74
End: 2019-03-26

## 2019-03-27 ENCOUNTER — RESULT REVIEW (OUTPATIENT)
Age: 74
End: 2019-03-27

## 2019-03-27 ENCOUNTER — APPOINTMENT (OUTPATIENT)
Dept: INFUSION THERAPY | Facility: HOSPITAL | Age: 74
End: 2019-03-27

## 2019-03-27 ENCOUNTER — LABORATORY RESULT (OUTPATIENT)
Age: 74
End: 2019-03-27

## 2019-03-27 ENCOUNTER — NON-APPOINTMENT (OUTPATIENT)
Age: 74
End: 2019-03-27

## 2019-03-27 ENCOUNTER — OUTPATIENT (OUTPATIENT)
Dept: OUTPATIENT SERVICES | Facility: HOSPITAL | Age: 74
LOS: 1 days | End: 2019-03-27
Payer: MEDICARE

## 2019-03-27 ENCOUNTER — APPOINTMENT (OUTPATIENT)
Dept: HEMATOLOGY ONCOLOGY | Facility: CLINIC | Age: 74
End: 2019-03-27
Payer: MEDICARE

## 2019-03-27 ENCOUNTER — APPOINTMENT (OUTPATIENT)
Dept: CARDIOLOGY | Facility: CLINIC | Age: 74
End: 2019-03-27
Payer: MEDICARE

## 2019-03-27 ENCOUNTER — APPOINTMENT (OUTPATIENT)
Dept: CV DIAGNOSITCS | Facility: HOSPITAL | Age: 74
End: 2019-03-27

## 2019-03-27 VITALS
DIASTOLIC BLOOD PRESSURE: 67 MMHG | WEIGHT: 196.98 LBS | SYSTOLIC BLOOD PRESSURE: 114 MMHG | OXYGEN SATURATION: 98 % | RESPIRATION RATE: 16 BRPM | HEART RATE: 95 BPM | BODY MASS INDEX: 32.78 KG/M2 | TEMPERATURE: 98.5 F

## 2019-03-27 VITALS — SYSTOLIC BLOOD PRESSURE: 131 MMHG | DIASTOLIC BLOOD PRESSURE: 81 MMHG | HEART RATE: 82 BPM

## 2019-03-27 DIAGNOSIS — I25.10 ATHEROSCLEROTIC HEART DISEASE OF NATIVE CORONARY ARTERY WITHOUT ANGINA PECTORIS: ICD-10-CM

## 2019-03-27 DIAGNOSIS — Z90.5 ACQUIRED ABSENCE OF KIDNEY: Chronic | ICD-10-CM

## 2019-03-27 DIAGNOSIS — R94.5 ABNORMAL RESULTS OF LIVER FUNCTION STUDIES: ICD-10-CM

## 2019-03-27 DIAGNOSIS — Z98.891 HISTORY OF UTERINE SCAR FROM PREVIOUS SURGERY: Chronic | ICD-10-CM

## 2019-03-27 DIAGNOSIS — Z90.711 ACQUIRED ABSENCE OF UTERUS WITH REMAINING CERVICAL STUMP: Chronic | ICD-10-CM

## 2019-03-27 LAB
BASOPHILS # BLD AUTO: 0 K/UL — SIGNIFICANT CHANGE UP (ref 0–0.2)
EOSINOPHIL # BLD AUTO: 0.1 K/UL — SIGNIFICANT CHANGE UP (ref 0–0.5)
HCT VFR BLD CALC: 23.4 % — LOW (ref 34.5–45)
HGB BLD-MCNC: 8.6 G/DL — LOW (ref 11.5–15.5)
LYMPHOCYTES # BLD AUTO: 0.7 K/UL — LOW (ref 1–3.3)
LYMPHOCYTES # BLD AUTO: 15 % — SIGNIFICANT CHANGE UP (ref 13–44)
MCHC RBC-ENTMCNC: 32.4 PG — SIGNIFICANT CHANGE UP (ref 27–34)
MCHC RBC-ENTMCNC: 36.7 G/DL — HIGH (ref 32–36)
MCV RBC AUTO: 88.3 FL — SIGNIFICANT CHANGE UP (ref 80–100)
METAMYELOCYTES # FLD: 2 % — HIGH (ref 0–0)
MONOCYTES # BLD AUTO: 1.1 K/UL — HIGH (ref 0–0.9)
MONOCYTES NFR BLD AUTO: 19 % — HIGH (ref 2–14)
MYELOCYTES NFR BLD: 4 % — HIGH (ref 0–0)
NEUTROPHILS # BLD AUTO: 3.9 K/UL — SIGNIFICANT CHANGE UP (ref 1.8–7.4)
NEUTROPHILS NFR BLD AUTO: 60 % — SIGNIFICANT CHANGE UP (ref 43–77)
NRBC # BLD: 1 /100 — HIGH (ref 0–0)
PLAT MORPH BLD: NORMAL — SIGNIFICANT CHANGE UP
PLATELET # BLD AUTO: 269 K/UL — SIGNIFICANT CHANGE UP (ref 150–400)
RBC # BLD: 2.65 M/UL — LOW (ref 3.8–5.2)
RBC # FLD: 13.9 % — SIGNIFICANT CHANGE UP (ref 10.3–14.5)
RBC BLD AUTO: SIGNIFICANT CHANGE UP
WBC # BLD: 5.8 K/UL — SIGNIFICANT CHANGE UP (ref 3.8–10.5)
WBC # FLD AUTO: 5.8 K/UL — SIGNIFICANT CHANGE UP (ref 3.8–10.5)

## 2019-03-27 PROCEDURE — 93000 ELECTROCARDIOGRAM COMPLETE: CPT

## 2019-03-27 PROCEDURE — 93306 TTE W/DOPPLER COMPLETE: CPT

## 2019-03-27 PROCEDURE — 99215 OFFICE O/P EST HI 40 MIN: CPT

## 2019-03-27 PROCEDURE — 93306 TTE W/DOPPLER COMPLETE: CPT | Mod: 26

## 2019-03-27 PROCEDURE — 93356 MYOCRD STRAIN IMG SPCKL TRCK: CPT

## 2019-03-27 PROCEDURE — 0399T: CPT

## 2019-03-27 NOTE — HISTORY OF PRESENT ILLNESS
[Disease: _____________________] : Disease: [unfilled] [T: ___] : T[unfilled] [N: ___] : N[unfilled] [AJCC Stage: ____] : AJCC Stage: [unfilled] [Treatment Protocol] : Treatment Protocol [de-identified] : 72yo woman presenting for medical oncology consultation.\par \par Last mammogram . She has no history of abnormal breast imaging, breast biopsies, or surgeries.\par \par She saw her PMD who sent her for a screening mammogram after she palpated a mass on the R side of her chest 2018. She had been having body aches/soreness and feeling general malaise since November, despite a course of antibiotics (just after flu shot administered). Also some R shoulder/arm pains.\par \par 18 Mammogram screenin.7cm irregular spiculated mass upper outer right breast 8cm FN with associated architectural distortion and several associated microcalcifications. Partially included enlarged right axillary lymph nodes. BIRADS 0\par \par 18 bilateral breast US: R breast 10:00 7cm FN 2.6cm irregular hypoechoic mass (biopsy recommended). Inferior right axillary lymph node with focal area of eccentric cortical thickening (recommend US guided biopsy). 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 Michael 7/9. 1.4cm involved, DCIS cribiform pattern with high grade nuclear atypia and apocrine cytology very focally present. (coil shaped clip) R axillary lymph node: metastatic ductal carcinoma with apocrine cytology involving a lymph node (hydromark marking clip) ER 0% MS 0% HER2 IHC 0 negative\par \par She saw Dr. Demetrio Robertson yesterday who recommended medical oncology consultation for neoadjuvant chemotherapy. \par \par She noted on intial visit with me some occasional body aches still and fatigue over the last month. She notes an intermittent tongue sensation - like a scald from food (though she does not recall an episode of this). Symptoms intermittent for months. Otherwise she feels well. She is active at baseline, uses stationary bike at home most days for exercise. \par \par 19 PET/CT: Superolateral R breast and R axillary lymph nodes FDG-avid. S/p left nephrectomy. Few subcm b/l pulmonary nodules are nonspecific. Please correlate clinically for infection and follow up with CT chest in 1 month.\par \par TTE 19: EF 64%, GLS -20 Wall motion abnormality in inferolateral wall reported, EKG stable. Patient seen by Dr. Lorrie Yadav (Cardiology) - TTE reviewed without significant wall motion abnormalities, antihypertensives changed \par \par MRI Brain w/wo contrast 19: L maxillary polyp v. retention cyst. Non enhancing area of abnormal T2 prolongation involving left thalamic region, nonspecific.\par \par CT Chest 19: No interval changes since 19. Stable 3.3x2.1cm UOQ R breast, 2x1.2cm R axillary lymph node. Stable 4mm and smaller scattered indeterminant pulmonary nodules.\par \par She started neoadjuvant chemotherapy with AC on 19 \par \par Pertinent History:\par No family history of breast or ovarian cancer.\par HTN - saw Cardiologist 2018, stress test normal per patient, Dr. Samaniego [facility], sees him annually. BP well controlled generally \par HLD \par Single kidney (kidney donor to brother in ) - last Cr 1.32 (18) though patient notes no prior renal issues\par Transaminitis AST 43, ALT 54 (18), no prior history\par BCC of skin removed\par Hysterectomy for fibroid uterus\par Cscope last ~5 years ago, no polyps per patient\par PMD Lance Lafleur\par Single, lives with her son Richard who is here with her today. 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 [de-identified] : ER-[place]-HER2- [FreeTextEntry1] : Neoadjuvant Adriamycin 60mg/m2 + Cytoxan 600mg/m2 every 14 days x 4 cycles with Neulasta OnPro support (Day 1), to be followed by Taxol 80mg/m2 weekly for 12 doses [de-identified] : Patient presents today for follow up after C4 of AC with Neulasta OnPro. She again used reglan a few times this cycle for some queasiness without vomiting, with relief. She notes BP has been well controlled. She has a lingering cough - improved with albuterol inhaler. She still feels lump in vaginal fold but it is smaller. She started tamiflu as prescribed - daughter has flu and is improving. She denies fevers, chills, cough, HA, dizziness, poor PO intake, diarrhea, abd pain, edema. She has no other complaints and notes good energy.

## 2019-03-27 NOTE — ASSESSMENT
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 She is doing well after 4th cycle with minimal complaints. Some recent URI symptoms improving with supportive care.\par \par Breast Cancer:\par -continue chemotherapy - taxol week 1 today\par -MRI Breasts deferred; she notes that she is planning b/l mastectomy surgery\par - repeat CT Chest in near future for pulmonary nodules (discussed this result with patient and inconclusive findings regarding nodules, too small to biopsy, interval re-evaluation planned) - ordered\par -Reglan PRN for nausea, EMLA cream use for Mediport treatments\par -interval repeat MRI brain planned - ordered\par -patient's best contact home number: [phone]\par -RTC 2 weeks with next treatment, sooner if issues - patient was instructed to go to ER immediately for fever >100.4 or any concerning symptoms, call MD line 24/7 with questions or concerns\par \par HTN: \par -follow up Dr. Yadav with repeat TTE planned\par -continue new antihypertensives and home BP monitoring per her recommendations (BP well controlled currently)\par \par URI symptoms: overall improving, post-viral cough persists, CXR clear, lung exam clear, afebrile\par -referral to ENT, maxillary polyp incidentally noted on MRI Brain (has hx recurrent sinusitis) - pending scheduling\par -advised she continue supportive meds, hydration, albuterol PRN\par -CT chest planned as above\par

## 2019-03-27 NOTE — PHYSICAL EXAM
[Fully active, able to carry on all pre-disease performance without restriction] : Status 0 - Fully active, able to carry on all pre-disease performance without restriction [Normal] : affect appropriate [de-identified] : R breast with underined thickening ~10' o clock, no alfonso mass palpable. no masses or adenopathy palpated b/l (stable/unchanged) [de-identified] : dry skin in b/l vaginal folds without palpable masses or lesions [de-identified] : L chest wall port c/d/i well healed nontender, slight erythema of skin stable, EMLA cream in place

## 2019-04-02 ENCOUNTER — FORM ENCOUNTER (OUTPATIENT)
Age: 74
End: 2019-04-02

## 2019-04-02 ENCOUNTER — RX CHANGE (OUTPATIENT)
Age: 74
End: 2019-04-02

## 2019-04-03 ENCOUNTER — APPOINTMENT (OUTPATIENT)
Dept: HEMATOLOGY ONCOLOGY | Facility: CLINIC | Age: 74
End: 2019-04-03
Payer: MEDICARE

## 2019-04-03 ENCOUNTER — OUTPATIENT (OUTPATIENT)
Dept: OUTPATIENT SERVICES | Facility: HOSPITAL | Age: 74
LOS: 1 days | End: 2019-04-03
Payer: MEDICARE

## 2019-04-03 ENCOUNTER — RX CHANGE (OUTPATIENT)
Age: 74
End: 2019-04-03

## 2019-04-03 ENCOUNTER — RESULT REVIEW (OUTPATIENT)
Age: 74
End: 2019-04-03

## 2019-04-03 ENCOUNTER — LABORATORY RESULT (OUTPATIENT)
Age: 74
End: 2019-04-03

## 2019-04-03 ENCOUNTER — APPOINTMENT (OUTPATIENT)
Dept: INFUSION THERAPY | Facility: HOSPITAL | Age: 74
End: 2019-04-03

## 2019-04-03 ENCOUNTER — APPOINTMENT (OUTPATIENT)
Dept: CT IMAGING | Facility: IMAGING CENTER | Age: 74
End: 2019-04-03
Payer: MEDICARE

## 2019-04-03 VITALS
DIASTOLIC BLOOD PRESSURE: 76 MMHG | SYSTOLIC BLOOD PRESSURE: 127 MMHG | BODY MASS INDEX: 33.12 KG/M2 | OXYGEN SATURATION: 97 % | RESPIRATION RATE: 16 BRPM | TEMPERATURE: 97.5 F | HEART RATE: 92 BPM | WEIGHT: 199 LBS

## 2019-04-03 DIAGNOSIS — Z90.5 ACQUIRED ABSENCE OF KIDNEY: Chronic | ICD-10-CM

## 2019-04-03 DIAGNOSIS — Z98.891 HISTORY OF UTERINE SCAR FROM PREVIOUS SURGERY: Chronic | ICD-10-CM

## 2019-04-03 DIAGNOSIS — C50.919 MALIGNANT NEOPLASM OF UNSPECIFIED SITE OF UNSPECIFIED FEMALE BREAST: ICD-10-CM

## 2019-04-03 DIAGNOSIS — Z90.711 ACQUIRED ABSENCE OF UTERUS WITH REMAINING CERVICAL STUMP: Chronic | ICD-10-CM

## 2019-04-03 LAB
BASOPHILS # BLD AUTO: 0 K/UL — SIGNIFICANT CHANGE UP (ref 0–0.2)
BASOPHILS NFR BLD AUTO: 0.4 % — SIGNIFICANT CHANGE UP (ref 0–2)
BUN SERPL-MCNC: 18 MG/DL — SIGNIFICANT CHANGE UP (ref 7–23)
CA-I BLDA-SCNC: 1.14 MMOL/L — SIGNIFICANT CHANGE UP (ref 1.12–1.3)
CHLORIDE SERPL-SCNC: 99 MMOL/L — SIGNIFICANT CHANGE UP (ref 96–108)
CO2 SERPL-SCNC: 26 MMOL/L — SIGNIFICANT CHANGE UP (ref 22–31)
CREAT SERPL-MCNC: 1.2 MG/DL — SIGNIFICANT CHANGE UP (ref 0.5–1.3)
EOSINOPHIL # BLD AUTO: 0.1 K/UL — SIGNIFICANT CHANGE UP (ref 0–0.5)
EOSINOPHIL NFR BLD AUTO: 0.8 % — SIGNIFICANT CHANGE UP (ref 0–6)
GLUCOSE SERPL-MCNC: 191 MG/DL — HIGH (ref 70–99)
HCT VFR BLD CALC: 22.6 % — LOW (ref 34.5–45)
HGB BLD-MCNC: 8 G/DL — LOW (ref 11.5–15.5)
LYMPHOCYTES # BLD AUTO: 0.8 K/UL — LOW (ref 1–3.3)
LYMPHOCYTES # BLD AUTO: 9.8 % — LOW (ref 13–44)
MCHC RBC-ENTMCNC: 31.6 PG — SIGNIFICANT CHANGE UP (ref 27–34)
MCHC RBC-ENTMCNC: 35.4 G/DL — SIGNIFICANT CHANGE UP (ref 32–36)
MCV RBC AUTO: 89.2 FL — SIGNIFICANT CHANGE UP (ref 80–100)
MONOCYTES # BLD AUTO: 0.6 K/UL — SIGNIFICANT CHANGE UP (ref 0–0.9)
MONOCYTES NFR BLD AUTO: 7 % — SIGNIFICANT CHANGE UP (ref 2–14)
NEUTROPHILS # BLD AUTO: 6.7 K/UL — SIGNIFICANT CHANGE UP (ref 1.8–7.4)
NEUTROPHILS NFR BLD AUTO: 81.9 % — HIGH (ref 43–77)
PLATELET # BLD AUTO: 318 K/UL — SIGNIFICANT CHANGE UP (ref 150–400)
POTASSIUM SERPL-MCNC: 3.6 MMOL/L — SIGNIFICANT CHANGE UP (ref 3.5–5.3)
POTASSIUM SERPL-SCNC: 3.6 MMOL/L — SIGNIFICANT CHANGE UP (ref 3.5–5.3)
RBC # BLD: 2.54 M/UL — LOW (ref 3.8–5.2)
RBC # FLD: 14.4 % — SIGNIFICANT CHANGE UP (ref 10.3–14.5)
SODIUM SERPL-SCNC: 139 MMOL/L — SIGNIFICANT CHANGE UP (ref 135–145)
WBC # BLD: 8.2 K/UL — SIGNIFICANT CHANGE UP (ref 3.8–10.5)
WBC # FLD AUTO: 8.2 K/UL — SIGNIFICANT CHANGE UP (ref 3.8–10.5)

## 2019-04-03 PROCEDURE — 71260 CT THORAX DX C+: CPT | Mod: 26

## 2019-04-03 PROCEDURE — 99215 OFFICE O/P EST HI 40 MIN: CPT

## 2019-04-03 PROCEDURE — 71260 CT THORAX DX C+: CPT

## 2019-04-04 ENCOUNTER — OUTPATIENT (OUTPATIENT)
Dept: OUTPATIENT SERVICES | Facility: HOSPITAL | Age: 74
LOS: 1 days | Discharge: HOME | End: 2019-04-04

## 2019-04-04 DIAGNOSIS — Z90.711 ACQUIRED ABSENCE OF UTERUS WITH REMAINING CERVICAL STUMP: Chronic | ICD-10-CM

## 2019-04-04 DIAGNOSIS — Z90.5 ACQUIRED ABSENCE OF KIDNEY: Chronic | ICD-10-CM

## 2019-04-04 DIAGNOSIS — Z98.891 HISTORY OF UTERINE SCAR FROM PREVIOUS SURGERY: Chronic | ICD-10-CM

## 2019-04-04 DIAGNOSIS — C50.919 MALIGNANT NEOPLASM OF UNSPECIFIED SITE OF UNSPECIFIED FEMALE BREAST: ICD-10-CM

## 2019-04-04 LAB
ALBUMIN SERPL ELPH-MCNC: 4 G/DL
ALP BLD-CCNC: 45 U/L
ALT SERPL-CCNC: 21 U/L
ANION GAP SERPL CALC-SCNC: 14 MMOL/L
AST SERPL-CCNC: 18 U/L
BILIRUB SERPL-MCNC: 0.2 MG/DL
BUN SERPL-MCNC: 18 MG/DL
CALCIUM SERPL-MCNC: 9 MG/DL
CHLORIDE SERPL-SCNC: 101 MMOL/L
CO2 SERPL-SCNC: 26 MMOL/L
CREAT SERPL-MCNC: 1.19 MG/DL
GLUCOSE SERPL-MCNC: 191 MG/DL
POTASSIUM SERPL-SCNC: 4 MMOL/L
PROT SERPL-MCNC: 6.2 G/DL
SODIUM SERPL-SCNC: 141 MMOL/L

## 2019-04-04 RX ORDER — NIFEDIPINE 60 MG/1
60 TABLET, EXTENDED RELEASE ORAL
Qty: 90 | Refills: 0 | Status: DISCONTINUED | COMMUNITY
Start: 2018-09-21

## 2019-04-04 RX ORDER — METOCLOPRAMIDE 10 MG/1
10 TABLET ORAL EVERY 8 HOURS
Qty: 30 | Refills: 0 | Status: DISCONTINUED | COMMUNITY
Start: 2019-01-29 | End: 2019-04-04

## 2019-04-04 RX ORDER — NIFEDIPINE 60 MG/1
60 TABLET, FILM COATED, EXTENDED RELEASE ORAL
Qty: 90 | Refills: 0 | Status: DISCONTINUED | COMMUNITY
Start: 2019-03-28

## 2019-04-04 RX ORDER — CARVEDILOL 12.5 MG/1
12.5 TABLET, FILM COATED ORAL
Qty: 60 | Refills: 0 | Status: DISCONTINUED | COMMUNITY
Start: 2019-01-28

## 2019-04-04 RX ORDER — OSELTAMIVIR PHOSPHATE 30 MG/1
30 CAPSULE ORAL
Qty: 7 | Refills: 0 | Status: DISCONTINUED | COMMUNITY
Start: 2019-02-06 | End: 2019-04-04

## 2019-04-04 RX ORDER — OXYCODONE 5 MG/1
5 TABLET ORAL
Qty: 10 | Refills: 0 | Status: DISCONTINUED | COMMUNITY
Start: 2019-01-25

## 2019-04-04 RX ORDER — BENZONATATE 200 MG/1
200 CAPSULE ORAL 3 TIMES DAILY
Qty: 21 | Refills: 0 | Status: DISCONTINUED | COMMUNITY
Start: 2017-12-22 | End: 2019-04-04

## 2019-04-04 RX ORDER — BENZONATATE 100 MG/1
100 CAPSULE ORAL
Qty: 30 | Refills: 0 | Status: DISCONTINUED | COMMUNITY
Start: 2019-03-13 | End: 2019-04-04

## 2019-04-04 NOTE — ASSESSMENT
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 She is experiencing neuropathy and a new rash after taxol #1.  Some recent URI symptoms improving with supportive care.\par \par Breast Cancer:\par -HOLD taxol today\par -check stat BMP for Cr, check CT Chest today given persistent cough\par -MRI Breasts deferred; she notes that she is planning b/l mastectomy surgery\par -Reglan PRN for nausea, EMLA cream use for Mediport treatments\par -interval repeat MRI brain planned - ordered, patient defers scheduling until cough improves further\par -patient's best contact home number: [phone], [phone]\par -RTC 1 week with next treatment, sooner if issues - patient was instructed to go to ER immediately for fever >100.4 or any concerning symptoms, call MD line 24/7 with questions or concerns\par \par Neuropathy: peripheral, grade 2, some functional deficits, acute after first dose of taxol\par -hold treatment as above, continue to monitor\par \par Rash: unclear etiology, likely taxol related, palpable, not itchy\par -dermatology evaluation scheduled for patient tomorrow, follow up\par \par Anemia: Hgb 8, normocytic, suspect AC related\par -continue to monitor, asymptomatic, T&S next visit\par \par HTN: \par -follow up Dr. Yadav with repeat TTE planned\par -continue new antihypertensives and home BP monitoring per her recommendations (BP well controlled currently)\par \par URI symptoms: overall improving, post-viral cough persists, CXR clear, lung exam clear, afebrile\par -referral to ENT, maxillary polyp incidentally noted on MRI Brain (has hx recurrent sinusitis) - pending scheduling\par -advised she continue supportive meds, hydration, albuterol PRN\par -CT chest planned as above\par

## 2019-04-04 NOTE — HISTORY OF PRESENT ILLNESS
[Disease: _____________________] : Disease: [unfilled] [T: ___] : T[unfilled] [N: ___] : N[unfilled] [AJCC Stage: ____] : AJCC Stage: [unfilled] [de-identified] : 74yo woman presenting for medical oncology consultation.\par \par Last mammogram . She has no history of abnormal breast imaging, breast biopsies, or surgeries.\par \par She saw her PMD who sent her for a screening mammogram after she palpated a mass on the R side of her chest 2018. She had been having body aches/soreness and feeling general malaise since November, despite a course of antibiotics (just after flu shot administered). Also some R shoulder/arm pains.\par \par 18 Mammogram screenin.7cm irregular spiculated mass upper outer right breast 8cm FN with associated architectural distortion and several associated microcalcifications. Partially included enlarged right axillary lymph nodes. BIRADS 0\par \par 18 bilateral breast US: R breast 10:00 7cm FN 2.6cm irregular hypoechoic mass (biopsy recommended). Inferior right axillary lymph node with focal area of eccentric cortical thickening (recommend US guided biopsy). 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 Michael 7/9. 1.4cm involved, DCIS cribiform pattern with high grade nuclear atypia and apocrine cytology very focally present. (coil shaped clip) R axillary lymph node: metastatic ductal carcinoma with apocrine cytology involving a lymph node (hydromark marking clip) ER 0% SC 0% HER2 IHC 0 negative\par \par She saw Dr. Demetrio Robertson yesterday who recommended medical oncology consultation for neoadjuvant chemotherapy. \par \par She noted on intial visit with me some occasional body aches still and fatigue over the last month. She notes an intermittent tongue sensation - like a scald from food (though she does not recall an episode of this). Symptoms intermittent for months. Otherwise she feels well. She is active at baseline, uses stationary bike at home most days for exercise. \par \par 19 PET/CT: Superolateral R breast and R axillary lymph nodes FDG-avid. S/p left nephrectomy. Few subcm b/l pulmonary nodules are nonspecific. Please correlate clinically for infection and follow up with CT chest in 1 month.\par \par TTE 19: EF 64%, GLS -20 Wall motion abnormality in inferolateral wall reported, EKG stable. Patient seen by Dr. Lorrie Yadav (Cardiology) - TTE reviewed without significant wall motion abnormalities, antihypertensives changed \par \par MRI Brain w/wo contrast 19: L maxillary polyp v. retention cyst. Non enhancing area of abnormal T2 prolongation involving left thalamic region, nonspecific.\par \par CT Chest 19: No interval changes since 19. Stable 3.3x2.1cm UOQ R breast, 2x1.2cm R axillary lymph node. Stable 4mm and smaller scattered indeterminant pulmonary nodules.\par \par She started neoadjuvant chemotherapy with AC on 19. Taxol #1 3/27.\par \par Pertinent History:\par No family history of breast or ovarian cancer.\par HTN - saw Cardiologist 2018, stress test normal per patient, Dr. Samaniego [facility], sees him annually. BP well controlled generally \par HLD \par Single kidney (kidney donor to brother in ) - last Cr 1.32 (18) though patient notes no prior renal issues\par Transaminitis AST 43, ALT 54 (18), no prior history\par BCC of skin removed\par Hysterectomy for fibroid uterus\par Cscope last ~5 years ago, no polyps per patient\par PMD Lance Lafleur\par Single, lives with her son Richard. She has a daughter as well who works at [employer]. She is a retired RN. Planning a trip to [facility] with her boyfriend in May that she is postponing. [de-identified] : ER-MD-HER2- [Treatment Protocol] : Treatment Protocol [FreeTextEntry1] : Neoadjuvant Adriamycin 60mg/m2 + Cytoxan 600mg/m2 every 14 days x 4 cycles with Neulasta OnPro support (Day 1), to be followed by Taxol 80mg/m2 weekly for 12 doses [de-identified] : Patient presents today for follow up after taxol #1. She has a lingering cough - improved with albuterol inhaler but still present and unchanged since last week. She completed tamiflu and has had no fevers or new symptoms. She notes new burning pain in her hands to palm and b/l feet to mid sole that is persistent, taking tylenol with some relief, difficult to open bottles/button shirt. She notes a new rash over her whole body, worse on legs, that she noticed in the bathtub 2 days ago - painful spots with dry skin. She denies fevers, chills, cough, HA, dizziness, poor PO intake, diarrhea, abd pain, edema. She has no other complaints.

## 2019-04-04 NOTE — PHYSICAL EXAM
[Fully active, able to carry on all pre-disease performance without restriction] : Status 0 - Fully active, able to carry on all pre-disease performance without restriction [Normal] : affect appropriate [de-identified] : R breast with underlying thickening ~10' o clock, no alfonso mass palpable, dense breasts. no masses or adenopathy palpated b/l (stable/unchanged). slight erythema at breast folds improved [de-identified] : dry skin in b/l vaginal folds without palpable masses or lesions [de-identified] : L chest wall port c/d/i well healed nontender, slight erythema of skin stable, EMLA cream in place. Diffuse purple-red papules, on legs but also torso/arms, some with overlying scaling, very thin skin

## 2019-04-08 ENCOUNTER — APPOINTMENT (OUTPATIENT)
Dept: DERMATOLOGY | Facility: CLINIC | Age: 74
End: 2019-04-08

## 2019-04-09 ENCOUNTER — RESULT REVIEW (OUTPATIENT)
Age: 74
End: 2019-04-09

## 2019-04-09 ENCOUNTER — APPOINTMENT (OUTPATIENT)
Dept: HEMATOLOGY ONCOLOGY | Facility: CLINIC | Age: 74
End: 2019-04-09

## 2019-04-09 ENCOUNTER — OUTPATIENT (OUTPATIENT)
Dept: OUTPATIENT SERVICES | Facility: HOSPITAL | Age: 74
LOS: 1 days | End: 2019-04-09
Payer: MEDICARE

## 2019-04-09 DIAGNOSIS — C50.919 MALIGNANT NEOPLASM OF UNSPECIFIED SITE OF UNSPECIFIED FEMALE BREAST: ICD-10-CM

## 2019-04-09 DIAGNOSIS — Z98.891 HISTORY OF UTERINE SCAR FROM PREVIOUS SURGERY: Chronic | ICD-10-CM

## 2019-04-09 DIAGNOSIS — Z90.711 ACQUIRED ABSENCE OF UTERUS WITH REMAINING CERVICAL STUMP: Chronic | ICD-10-CM

## 2019-04-09 DIAGNOSIS — Z90.5 ACQUIRED ABSENCE OF KIDNEY: Chronic | ICD-10-CM

## 2019-04-09 LAB
BASOPHILS # BLD AUTO: 0 K/UL — SIGNIFICANT CHANGE UP (ref 0–0.2)
BASOPHILS NFR BLD AUTO: 0.6 % — SIGNIFICANT CHANGE UP (ref 0–2)
BLD GP AB SCN SERPL QL: NEGATIVE — SIGNIFICANT CHANGE UP
EOSINOPHIL # BLD AUTO: 0 K/UL — SIGNIFICANT CHANGE UP (ref 0–0.5)
EOSINOPHIL NFR BLD AUTO: 0.8 % — SIGNIFICANT CHANGE UP (ref 0–6)
HCT VFR BLD CALC: 25.9 % — LOW (ref 34.5–45)
HGB BLD-MCNC: 9.2 G/DL — LOW (ref 11.5–15.5)
LYMPHOCYTES # BLD AUTO: 0.6 K/UL — LOW (ref 1–3.3)
LYMPHOCYTES # BLD AUTO: 16.2 % — SIGNIFICANT CHANGE UP (ref 13–44)
MCHC RBC-ENTMCNC: 32.4 PG — SIGNIFICANT CHANGE UP (ref 27–34)
MCHC RBC-ENTMCNC: 35.5 G/DL — SIGNIFICANT CHANGE UP (ref 32–36)
MCV RBC AUTO: 91.2 FL — SIGNIFICANT CHANGE UP (ref 80–100)
MONOCYTES # BLD AUTO: 0.6 K/UL — SIGNIFICANT CHANGE UP (ref 0–0.9)
MONOCYTES NFR BLD AUTO: 16.4 % — HIGH (ref 2–14)
NEUTROPHILS # BLD AUTO: 2.6 K/UL — SIGNIFICANT CHANGE UP (ref 1.8–7.4)
NEUTROPHILS NFR BLD AUTO: 66 % — SIGNIFICANT CHANGE UP (ref 43–77)
PLATELET # BLD AUTO: 236 K/UL — SIGNIFICANT CHANGE UP (ref 150–400)
RBC # BLD: 2.84 M/UL — LOW (ref 3.8–5.2)
RBC # FLD: 15.3 % — HIGH (ref 10.3–14.5)
RH IG SCN BLD-IMP: POSITIVE — SIGNIFICANT CHANGE UP
WBC # BLD: 3.9 K/UL — SIGNIFICANT CHANGE UP (ref 3.8–10.5)
WBC # FLD AUTO: 3.9 K/UL — SIGNIFICANT CHANGE UP (ref 3.8–10.5)

## 2019-04-09 PROCEDURE — 86850 RBC ANTIBODY SCREEN: CPT

## 2019-04-09 PROCEDURE — 86901 BLOOD TYPING SEROLOGIC RH(D): CPT

## 2019-04-09 PROCEDURE — 86900 BLOOD TYPING SEROLOGIC ABO: CPT

## 2019-04-10 ENCOUNTER — APPOINTMENT (OUTPATIENT)
Dept: INFUSION THERAPY | Facility: HOSPITAL | Age: 74
End: 2019-04-10

## 2019-04-10 ENCOUNTER — APPOINTMENT (OUTPATIENT)
Dept: HEMATOLOGY ONCOLOGY | Facility: CLINIC | Age: 74
End: 2019-04-10
Payer: MEDICARE

## 2019-04-10 ENCOUNTER — RESULT REVIEW (OUTPATIENT)
Age: 74
End: 2019-04-10

## 2019-04-10 ENCOUNTER — LABORATORY RESULT (OUTPATIENT)
Age: 74
End: 2019-04-10

## 2019-04-10 VITALS
RESPIRATION RATE: 16 BRPM | TEMPERATURE: 98.7 F | OXYGEN SATURATION: 95 % | DIASTOLIC BLOOD PRESSURE: 79 MMHG | HEART RATE: 94 BPM | SYSTOLIC BLOOD PRESSURE: 126 MMHG

## 2019-04-10 LAB
ALBUMIN SERPL ELPH-MCNC: 4 G/DL
ALP BLD-CCNC: 49 U/L
ALT SERPL-CCNC: 23 U/L
ANION GAP SERPL CALC-SCNC: 13 MMOL/L
AST SERPL-CCNC: 21 U/L
BASOPHILS # BLD AUTO: 0 K/UL — SIGNIFICANT CHANGE UP (ref 0–0.2)
BASOPHILS NFR BLD AUTO: 0.9 % — SIGNIFICANT CHANGE UP (ref 0–2)
BILIRUB SERPL-MCNC: 0.2 MG/DL
BUN SERPL-MCNC: 17 MG/DL
CALCIUM SERPL-MCNC: 8.9 MG/DL
CHLORIDE SERPL-SCNC: 102 MMOL/L
CO2 SERPL-SCNC: 26 MMOL/L
CREAT SERPL-MCNC: 0.99 MG/DL
EOSINOPHIL # BLD AUTO: 0.1 K/UL — SIGNIFICANT CHANGE UP (ref 0–0.5)
EOSINOPHIL NFR BLD AUTO: 2.1 % — SIGNIFICANT CHANGE UP (ref 0–6)
GLUCOSE SERPL-MCNC: 221 MG/DL
HCT VFR BLD CALC: 26.5 % — LOW (ref 34.5–45)
HGB BLD-MCNC: 9.8 G/DL — LOW (ref 11.5–15.5)
LYMPHOCYTES # BLD AUTO: 0.9 K/UL — LOW (ref 1–3.3)
LYMPHOCYTES # BLD AUTO: 17.5 % — SIGNIFICANT CHANGE UP (ref 13–44)
MCHC RBC-ENTMCNC: 33.8 PG — SIGNIFICANT CHANGE UP (ref 27–34)
MCHC RBC-ENTMCNC: 36.9 G/DL — HIGH (ref 32–36)
MCV RBC AUTO: 91.5 FL — SIGNIFICANT CHANGE UP (ref 80–100)
MONOCYTES # BLD AUTO: 0.8 K/UL — SIGNIFICANT CHANGE UP (ref 0–0.9)
MONOCYTES NFR BLD AUTO: 15.7 % — HIGH (ref 2–14)
NEUTROPHILS # BLD AUTO: 3.2 K/UL — SIGNIFICANT CHANGE UP (ref 1.8–7.4)
NEUTROPHILS NFR BLD AUTO: 63.8 % — SIGNIFICANT CHANGE UP (ref 43–77)
PLATELET # BLD AUTO: 237 K/UL — SIGNIFICANT CHANGE UP (ref 150–400)
POTASSIUM SERPL-SCNC: 3.9 MMOL/L
PROT SERPL-MCNC: 6 G/DL
RBC # BLD: 2.9 M/UL — LOW (ref 3.8–5.2)
RBC # FLD: 15.5 % — HIGH (ref 10.3–14.5)
SODIUM SERPL-SCNC: 141 MMOL/L
VIT B12 SERPL-MCNC: 1379 PG/ML
WBC # BLD: 5 K/UL — SIGNIFICANT CHANGE UP (ref 3.8–10.5)
WBC # FLD AUTO: 5 K/UL — SIGNIFICANT CHANGE UP (ref 3.8–10.5)

## 2019-04-10 PROCEDURE — 99215 OFFICE O/P EST HI 40 MIN: CPT

## 2019-04-10 NOTE — ASSESSMENT
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 She is experiencing neuropathy and a new rash after taxol #1; taxol last week held. CT chest for cough4/2019 with stable 3mm pulm nodules, decreased size of breast mass and axillary LN.\par \par Breast Cancer:\par -Retrial of taxol #2 today c/b feeling of throat and chest tightness, nausea and wretching, worsening rash over body after 4 minutes of infusion with premeds - improved with solucortef and additional Benadryl administration. Will not rechallenge, discussed with patient after defervescence (examined by me in treatment room). Discussed trial of taxotere (q3 weeks with neulasta onpro) with steroid premedication, patient consents to this. Will plan for rescheduling ASAP - patient notes she would like to reschedule for early next week\par -check BMP today\par -MRI Breasts deferred; she notes that she is planning b/l mastectomy surgery\par -Reglan PRN for nausea, EMLA cream use for Mediport treatments\par -interval repeat MRI brain planned - ordered, patient defered scheduling until cough improves further, readdressed today and she will reschedule\par -patient's best contact home number: [phone], [phone]\par -RTC with next treatment, sooner if issues - patient was instructed to go to ER immediately for fever >100.4 or any concerning symptoms, signs of recurrent allergic reactions, call MD line 24/7 with questions or concerns\par \par Neuropathy: peripheral, grade 2, some functional deficits, acute after first dose of taxol, now grade 1 improved\par -hold treatment as above, continue to monitor\par \par Rash: unclear etiology, raised, erythematous, diffuse, not significantly improved with hydrocortisone topical\par -dermatology evaluation by Dr. Mujica in office today - LEs consistent with small vessel vasculitis, unlikely related to taxol, possible drug eruption appearance at some lesions\par -check ANCA, ASO\par -betamethasone ointment BID eprescribed per Dr. Mujica recommendations; follow up with him 2 weeks\par \par Anemia: Hgb 8, normocytic, suspect AC related, improved to >9 now\par -continue to monitor, asymptomatic\par \par HTN: \par -follow up Dr. Yadav with repeat TTE planned\par -continue new antihypertensives and home BP monitoring per her recommendations (BP well controlled currently)\par

## 2019-04-10 NOTE — PHYSICAL EXAM
[Fully active, able to carry on all pre-disease performance without restriction] : Status 0 - Fully active, able to carry on all pre-disease performance without restriction [Normal] : grossly intact [de-identified] : R breast with  no alfonso mass palpable, dense breasts. no masses or adenopathy palpated b/l (stable/unchanged) [de-identified] : L chest wall port c/d/i well healed nontender, slight erythema of skin stable, EMLA cream in place. Diffuse purple-red papules - less prominent, most on legs but also torso/arms, some with overlying scaling, very thin skin

## 2019-04-10 NOTE — HISTORY OF PRESENT ILLNESS
[Disease: _____________________] : Disease: [unfilled] [T: ___] : T[unfilled] [N: ___] : N[unfilled] [AJCC Stage: ____] : AJCC Stage: [unfilled] [Treatment Protocol] : Treatment Protocol [de-identified] : 74yo woman presenting for medical oncology consultation.\par \par Last mammogram . She has no history of abnormal breast imaging, breast biopsies, or surgeries.\par \par She saw her PMD who sent her for a screening mammogram after she palpated a mass on the R side of her chest 2018. She had been having body aches/soreness and feeling general malaise since November, despite a course of antibiotics (just after flu shot administered). Also some R shoulder/arm pains.\par \par 18 Mammogram screenin.7cm irregular spiculated mass upper outer right breast 8cm FN with associated architectural distortion and several associated microcalcifications. Partially included enlarged right axillary lymph nodes. BIRADS 0\par \par 18 bilateral breast US: R breast 10:00 7cm FN 2.6cm irregular hypoechoic mass (biopsy recommended). Inferior right axillary lymph node with focal area of eccentric cortical thickening (recommend US guided biopsy). 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 Michael 7/9. 1.4cm involved, DCIS cribiform pattern with high grade nuclear atypia and apocrine cytology very focally present. (coil shaped clip) R axillary lymph node: metastatic ductal carcinoma with apocrine cytology involving a lymph node (hydromark marking clip) ER 0% NM 0% HER2 IHC 0 negative\par \par She saw Dr. Demetrio Robertson yesterday who recommended medical oncology consultation for neoadjuvant chemotherapy. \par \par She noted on intial visit with me some occasional body aches still and fatigue over the last month. She notes an intermittent tongue sensation - like a scald from food (though she does not recall an episode of this). Symptoms intermittent for months. Otherwise she feels well. She is active at baseline, uses stationary bike at home most days for exercise. \par \par 19 PET/CT: Superolateral R breast and R axillary lymph nodes FDG-avid. S/p left nephrectomy. Few subcm b/l pulmonary nodules are nonspecific. Please correlate clinically for infection and follow up with CT chest in 1 month.\par \par TTE 19: EF 64%, GLS -20 Wall motion abnormality in inferolateral wall reported, EKG stable. Patient seen by Dr. Lorrie Yadav (Cardiology) - TTE reviewed without significant wall motion abnormalities, antihypertensives changed \par \par MRI Brain w/wo contrast 19: L maxillary polyp v. retention cyst. Non enhancing area of abnormal T2 prolongation involving left thalamic region, nonspecific.\par \par CT Chest 19: No interval changes since 19. Stable 3.3x2.1cm UOQ R breast, 2x1.2cm R axillary lymph node. Stable 4mm and smaller scattered indeterminant pulmonary nodules.\par \par She started neoadjuvant chemotherapy with AC on 19. Taxol #1 3/27.\par \par Pertinent History:\par No family history of breast or ovarian cancer.\par HTN - saw Cardiologist 2018, stress test normal per patient, Dr. Samaniego [facility], sees him annually. BP well controlled generally \par HLD \par Single kidney (kidney donor to brother in ) - last Cr 1.32 (18) though patient notes no prior renal issues\par Transaminitis AST 43, ALT 54 (18), no prior history\par BCC of skin removed\par Hysterectomy for fibroid uterus\par Cscope last ~5 years ago, no polyps per patient\par PMD Lance Lafleur\par Single, lives with her son Richard. She has a daughter as well who works at [employer]. 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 [de-identified] : ER-MI-HER2- [de-identified] : Patient presents today for follow up after taxol #1 - dose held last week due to rash, persistent cough, burning pain in hands/feet. She tells me her cough has resolved completely and energy is much improved. She has intermittent mild tingling in the very tips of her fingers not always, not bothersome, able to do all ADLs/IADLs, button buttons, etc. Her rash lesions have improved but most are still present, just less prominent and not painful, not improved with 2% hydrocortisone ointment recommended by dermatologist at [facility]. She denies fevers, chills, cough, HA, dizziness, poor PO intake, diarrhea, abd pain, edema. She has no other complaints.  [FreeTextEntry1] : Neoadjuvant Adriamycin 60mg/m2 + Cytoxan 600mg/m2 every 14 days x 4 cycles with Neulasta OnPro support (Day 1), to be followed by Taxol 80mg/m2 weekly for 12 doses

## 2019-04-11 ENCOUNTER — MEDICATION RENEWAL (OUTPATIENT)
Age: 74
End: 2019-04-11

## 2019-04-11 DIAGNOSIS — R11.2 NAUSEA WITH VOMITING, UNSPECIFIED: ICD-10-CM

## 2019-04-11 DIAGNOSIS — Z51.11 ENCOUNTER FOR ANTINEOPLASTIC CHEMOTHERAPY: ICD-10-CM

## 2019-04-15 ENCOUNTER — CLINICAL ADVICE (OUTPATIENT)
Age: 74
End: 2019-04-15

## 2019-04-17 ENCOUNTER — APPOINTMENT (OUTPATIENT)
Age: 74
End: 2019-04-17

## 2019-04-17 ENCOUNTER — APPOINTMENT (OUTPATIENT)
Dept: HEMATOLOGY ONCOLOGY | Facility: CLINIC | Age: 74
End: 2019-04-17
Payer: MEDICARE

## 2019-04-17 ENCOUNTER — RESULT REVIEW (OUTPATIENT)
Age: 74
End: 2019-04-17

## 2019-04-17 ENCOUNTER — FORM ENCOUNTER (OUTPATIENT)
Age: 74
End: 2019-04-17

## 2019-04-17 ENCOUNTER — RX RENEWAL (OUTPATIENT)
Age: 74
End: 2019-04-17

## 2019-04-17 ENCOUNTER — LABORATORY RESULT (OUTPATIENT)
Age: 74
End: 2019-04-17

## 2019-04-17 ENCOUNTER — APPOINTMENT (OUTPATIENT)
Dept: INFUSION THERAPY | Facility: HOSPITAL | Age: 74
End: 2019-04-17

## 2019-04-17 VITALS
OXYGEN SATURATION: 98 % | DIASTOLIC BLOOD PRESSURE: 69 MMHG | BODY MASS INDEX: 32.78 KG/M2 | WEIGHT: 197 LBS | HEART RATE: 86 BPM | SYSTOLIC BLOOD PRESSURE: 127 MMHG | TEMPERATURE: 98 F | RESPIRATION RATE: 16 BRPM

## 2019-04-17 LAB
BASOPHILS # BLD AUTO: 0 K/UL — SIGNIFICANT CHANGE UP (ref 0–0.2)
BASOPHILS NFR BLD AUTO: 0.1 % — SIGNIFICANT CHANGE UP (ref 0–2)
EOSINOPHIL # BLD AUTO: 0.1 K/UL — SIGNIFICANT CHANGE UP (ref 0–0.5)
EOSINOPHIL NFR BLD AUTO: 0.4 % — SIGNIFICANT CHANGE UP (ref 0–6)
HCT VFR BLD CALC: 29.3 % — LOW (ref 34.5–45)
HGB BLD-MCNC: 10.4 G/DL — LOW (ref 11.5–15.5)
LYMPHOCYTES # BLD AUTO: 0.6 K/UL — LOW (ref 1–3.3)
LYMPHOCYTES # BLD AUTO: 3.8 % — LOW (ref 13–44)
MCHC RBC-ENTMCNC: 33.3 PG — SIGNIFICANT CHANGE UP (ref 27–34)
MCHC RBC-ENTMCNC: 35.7 G/DL — SIGNIFICANT CHANGE UP (ref 32–36)
MCV RBC AUTO: 93.3 FL — SIGNIFICANT CHANGE UP (ref 80–100)
MONOCYTES # BLD AUTO: 0.3 K/UL — SIGNIFICANT CHANGE UP (ref 0–0.9)
MONOCYTES NFR BLD AUTO: 1.9 % — LOW (ref 2–14)
NEUTROPHILS # BLD AUTO: 15 K/UL — HIGH (ref 1.8–7.4)
NEUTROPHILS NFR BLD AUTO: 93.7 % — HIGH (ref 43–77)
PLATELET # BLD AUTO: 250 K/UL — SIGNIFICANT CHANGE UP (ref 150–400)
RBC # BLD: 3.14 M/UL — LOW (ref 3.8–5.2)
RBC # FLD: 15.7 % — HIGH (ref 10.3–14.5)
WBC # BLD: 16 K/UL — HIGH (ref 3.8–10.5)
WBC # FLD AUTO: 16 K/UL — HIGH (ref 3.8–10.5)

## 2019-04-17 PROCEDURE — 99215 OFFICE O/P EST HI 40 MIN: CPT

## 2019-04-18 ENCOUNTER — RESULT REVIEW (OUTPATIENT)
Age: 74
End: 2019-04-18

## 2019-04-18 ENCOUNTER — APPOINTMENT (OUTPATIENT)
Dept: ULTRASOUND IMAGING | Facility: IMAGING CENTER | Age: 74
End: 2019-04-18
Payer: MEDICARE

## 2019-04-18 ENCOUNTER — OUTPATIENT (OUTPATIENT)
Dept: OUTPATIENT SERVICES | Facility: HOSPITAL | Age: 74
LOS: 1 days | End: 2019-04-18
Payer: MEDICARE

## 2019-04-18 ENCOUNTER — APPOINTMENT (OUTPATIENT)
Dept: HEMATOLOGY ONCOLOGY | Facility: CLINIC | Age: 74
End: 2019-04-18

## 2019-04-18 ENCOUNTER — APPOINTMENT (OUTPATIENT)
Dept: HEMATOLOGY ONCOLOGY | Facility: CLINIC | Age: 74
End: 2019-04-18
Payer: MEDICARE

## 2019-04-18 DIAGNOSIS — Z90.5 ACQUIRED ABSENCE OF KIDNEY: Chronic | ICD-10-CM

## 2019-04-18 DIAGNOSIS — Z90.711 ACQUIRED ABSENCE OF UTERUS WITH REMAINING CERVICAL STUMP: Chronic | ICD-10-CM

## 2019-04-18 DIAGNOSIS — C50.919 MALIGNANT NEOPLASM OF UNSPECIFIED SITE OF UNSPECIFIED FEMALE BREAST: ICD-10-CM

## 2019-04-18 DIAGNOSIS — Z98.891 HISTORY OF UTERINE SCAR FROM PREVIOUS SURGERY: Chronic | ICD-10-CM

## 2019-04-18 LAB
BASOPHILS # BLD AUTO: 0 K/UL — SIGNIFICANT CHANGE UP (ref 0–0.2)
BASOPHILS NFR BLD AUTO: 0 % — SIGNIFICANT CHANGE UP (ref 0–2)
BUN SERPL-MCNC: 34 MG/DL — HIGH (ref 7–23)
CA-I BLDA-SCNC: 1.2 MMOL/L — SIGNIFICANT CHANGE UP (ref 1.12–1.3)
CHLORIDE SERPL-SCNC: 100 MMOL/L — SIGNIFICANT CHANGE UP (ref 96–108)
CO2 SERPL-SCNC: 22 MMOL/L — SIGNIFICANT CHANGE UP (ref 22–31)
CREAT SERPL-MCNC: 1.1 MG/DL — SIGNIFICANT CHANGE UP (ref 0.5–1.3)
EOSINOPHIL # BLD AUTO: 0 K/UL — SIGNIFICANT CHANGE UP (ref 0–0.5)
EOSINOPHIL NFR BLD AUTO: 0.1 % — SIGNIFICANT CHANGE UP (ref 0–6)
GLUCOSE SERPL-MCNC: 378 MG/DL — HIGH (ref 70–99)
HCT VFR BLD CALC: 29.5 % — LOW (ref 34.5–45)
HGB BLD-MCNC: 10.4 G/DL — LOW (ref 11.5–15.5)
LYMPHOCYTES # BLD AUTO: 0.5 K/UL — LOW (ref 1–3.3)
LYMPHOCYTES # BLD AUTO: 3.5 % — LOW (ref 13–44)
MCHC RBC-ENTMCNC: 32.6 PG — SIGNIFICANT CHANGE UP (ref 27–34)
MCHC RBC-ENTMCNC: 35.3 G/DL — SIGNIFICANT CHANGE UP (ref 32–36)
MCV RBC AUTO: 92.5 FL — SIGNIFICANT CHANGE UP (ref 80–100)
MONOCYTES # BLD AUTO: 0.4 K/UL — SIGNIFICANT CHANGE UP (ref 0–0.9)
MONOCYTES NFR BLD AUTO: 2.6 % — SIGNIFICANT CHANGE UP (ref 2–14)
NEUTROPHILS # BLD AUTO: 13.9 K/UL — HIGH (ref 1.8–7.4)
NEUTROPHILS NFR BLD AUTO: 93.8 % — HIGH (ref 43–77)
PLATELET # BLD AUTO: 240 K/UL — SIGNIFICANT CHANGE UP (ref 150–400)
POTASSIUM SERPL-MCNC: 3.7 MMOL/L — SIGNIFICANT CHANGE UP (ref 3.5–5.3)
POTASSIUM SERPL-SCNC: 3.7 MMOL/L — SIGNIFICANT CHANGE UP (ref 3.5–5.3)
RBC # BLD: 3.19 M/UL — LOW (ref 3.8–5.2)
RBC # FLD: 15.4 % — HIGH (ref 10.3–14.5)
SODIUM SERPL-SCNC: 137 MMOL/L — SIGNIFICANT CHANGE UP (ref 135–145)
WBC # BLD: 14.9 K/UL — HIGH (ref 3.8–10.5)
WBC # FLD AUTO: 14.9 K/UL — HIGH (ref 3.8–10.5)

## 2019-04-18 PROCEDURE — 99215 OFFICE O/P EST HI 40 MIN: CPT

## 2019-04-18 PROCEDURE — 76641 ULTRASOUND BREAST COMPLETE: CPT

## 2019-04-18 PROCEDURE — 76641 ULTRASOUND BREAST COMPLETE: CPT | Mod: 26,50

## 2019-04-19 NOTE — HISTORY OF PRESENT ILLNESS
[Disease: _____________________] : Disease: [unfilled] [T: ___] : T[unfilled] [N: ___] : N[unfilled] [AJCC Stage: ____] : AJCC Stage: [unfilled] [Treatment Protocol] : Treatment Protocol [de-identified] : 74yo woman presenting for medical oncology consultation.\par \par Last mammogram . She has no history of abnormal breast imaging, breast biopsies, or surgeries.\par \par She saw her PMD who sent her for a screening mammogram after she palpated a mass on the R side of her chest 2018. She had been having body aches/soreness and feeling general malaise since November, despite a course of antibiotics (just after flu shot administered). Also some R shoulder/arm pains.\par \par 18 Mammogram screenin.7cm irregular spiculated mass upper outer right breast 8cm FN with associated architectural distortion and several associated microcalcifications. Partially included enlarged right axillary lymph nodes. BIRADS 0\par \par 18 bilateral breast US: R breast 10:00 7cm FN 2.6cm irregular hypoechoic mass (biopsy recommended). Inferior right axillary lymph node with focal area of eccentric cortical thickening (recommend US guided biopsy). 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 Michael 7/9. 1.4cm involved, DCIS cribiform pattern with high grade nuclear atypia and apocrine cytology very focally present. (coil shaped clip) R axillary lymph node: metastatic ductal carcinoma with apocrine cytology involving a lymph node (hydromark marking clip) ER 0% MN 0% HER2 IHC 0 negative\par \par She saw Dr. Demetrio Robertson yesterday who recommended medical oncology consultation for neoadjuvant chemotherapy. \par \par She noted on intial visit with me some occasional body aches still and fatigue over the last month. She notes an intermittent tongue sensation - like a scald from food (though she does not recall an episode of this). Symptoms intermittent for months. Otherwise she feels well. She is active at baseline, uses stationary bike at home most days for exercise. \par \par 19 PET/CT: Superolateral R breast and R axillary lymph nodes FDG-avid. S/p left nephrectomy. Few subcm b/l pulmonary nodules are nonspecific. Please correlate clinically for infection and follow up with CT chest in 1 month.\par \par TTE 19: EF 64%, GLS -20 Wall motion abnormality in inferolateral wall reported, EKG stable. Patient seen by Dr. Lorrie Yadav (Cardiology) - TTE reviewed without significant wall motion abnormalities, antihypertensives changed \par \par MRI Brain w/wo contrast 19: L maxillary polyp v. retention cyst. Non enhancing area of abnormal T2 prolongation involving left thalamic region, nonspecific.\par \par CT Chest 19: No interval changes since 19. Stable 3.3x2.1cm UOQ R breast, 2x1.2cm R axillary lymph node. Stable 4mm and smaller scattered indeterminant pulmonary nodules.\par \par She started neoadjuvant chemotherapy with AC on 19. Taxol #1 3/27.\par \par Pertinent History:\par No family history of breast or ovarian cancer.\par HTN - saw Cardiologist 2018, stress test normal per patient, Dr. Samaniego [facility], sees him annually. BP well controlled generally \par HLD \par Single kidney (kidney donor to brother in ) - last Cr 1.32 (18) though patient notes no prior renal issues\par Transaminitis AST 43, ALT 54 (18), no prior history\par BCC of skin removed\par Hysterectomy for fibroid uterus\par Cscope last ~5 years ago, no polyps per patient\par PMD Lance Lafleur\par Single, lives with her son Richard. She has a daughter as well who works at [employer]. 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 [de-identified] : ER-RI-HER2- [FreeTextEntry1] : Neoadjuvant Adriamycin 60mg/m2 + Cytoxan 600mg/m2 every 14 days x 4 cycles with Neulasta OnPro support (Day 1), to be followed by Taxol 80mg/m2 weekly for 12 doses [de-identified] : Patient presents today for follow up. Energy is good. She has intermittent mild tingling in the very tips of her fingers not always, not bothersome, able to do all ADLs/IADLs, button buttons, etc. Her rash lesions have improved but most are still present, just less prominent and not painful, using topical steroid I prescribed. She denies fevers, chills, cough, HA, dizziness, poor PO intake, diarrhea, abd pain, edema. She has no other complaints.

## 2019-04-19 NOTE — ASSESSMENT
[FreeTextEntry1] : 74yo woman with history of HTN on multiple medications, kidney donor/CKD, ER-MO-HER2-, lymph-node positive right breast cancer anatomic stage IIB, AJCC 8th edition clinical prognostic stage IIIB, on neoadjuvant chemotherapy (dose-dense AC-->T) presenting for follow up. She is experiencing neuropathy and a new rash after taxol #1; taxol last week held. CT chest for cough4/2019 with stable 3mm pulm nodules, decreased size of breast mass and axillary LN.\par \par Breast Cancer:\par -trial of taxotere today with steroid premedications; discussed risks/benefits of therapy with patient including risk for anaphylaxis - verbal and written consent obtained\par -check BMP today\par -MRI Breasts deferred; she notes that she is planning b/l mastectomy surgery\par -Reglan PRN for nausea, EMLA cream use for Mediport treatments\par -interval repeat MRI brain planned - ordered, patient defered scheduling until cough improves further, readdressed today and she will reschedule\par -patient's best contact home number: [phone], [phone]\par -RTC with next treatment, sooner if issues - patient was instructed to go to ER immediately for fever >100.4 or any concerning symptoms, signs of recurrent allergic reactions, call MD line 24/7 with questions or concerns\par \par Neuropathy: peripheral, grade 2, some functional deficits, acute after first dose of taxol, now grade 1 improved\par -hold treatment as above, continue to monitor\par \par Rash: unclear etiology, raised, erythematous, diffuse, not significantly improved with hydrocortisone topical\par -dermatology evaluation by Dr. Mujica in office last week - LEs consistent with small vessel vasculitis, unlikely related to taxol, possible drug eruption appearance at some lesions. ANCAs, ASO negative\par -betamethasone ointment BID eprescribed per Dr. Mujica recommendations; follow up with him next week\par \par Anemia: Hgb 8, normocytic, suspect AC related, improved to >10 now\par -continue to monitor, asymptomatic\par \par HTN: \par -follow up Dr. Yadav with repeat TTE planned\par -continue new antihypertensives and home BP monitoring per her recommendations (BP well controlled currently)\par

## 2019-04-19 NOTE — PHYSICAL EXAM
[Fully active, able to carry on all pre-disease performance without restriction] : Status 0 - Fully active, able to carry on all pre-disease performance without restriction [Normal] : affect appropriate [de-identified] : R breast with  no alfonso mass palpable, dense breasts. no masses or adenopathy palpated b/l (stable/unchanged) [de-identified] : L chest wall port c/d/i well healed nontender, slight erythema of skin stable, EMLA cream in place. Diffuse purple-red papules - less prominent/fewer

## 2019-04-20 PROBLEM — R94.5 ELEVATED LFTS: Status: ACTIVE | Noted: 2019-01-04

## 2019-04-20 NOTE — HISTORY OF PRESENT ILLNESS
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 EF preserved\par STRESS: No ischemia on EST plain\par \par \par Presents for precancer treatment evaluation, CV risk stratification   and further evaluation of wall motion abnormality before chemotherapy with ACT\par \par She is here with her son today\par \par ROS: Denies Chest pain, dyspnea, palpitations, dizziness or syncope.\par \par Interval Note\par February 13, 2109\par \par Patient returns for a follow up cardiac evaluation. She underwent a repeat echocardiogram today which was stable and unchanged. \par \par Chemotherapy cycle #2/4 planned for today: Adriamycin 60 mg/ m2 and Cytoxan every 14 days.\par June 4 ,2019 meeting surgeon, Dr. Demetrio Robertson  for bilateral mastectomy.\par \par Blood pressure is normotensive. EKG is stable and unchanged. \par \par Interval f/u 3/27/19\par \par doing well\par taking medication \par \par ROS: Denies Chest pain, dyspnea, palpitations, dizziness or syncope.\par

## 2019-04-20 NOTE — PHYSICAL EXAM
[General Appearance - Well Developed] : well developed [Normal Appearance] : normal appearance [Well Groomed] : well groomed [General Appearance - Well Nourished] : well nourished [No Deformities] : no deformities [General Appearance - In No Acute Distress] : no acute distress [Normal Conjunctiva] : the conjunctiva exhibited no abnormalities [Eyelids - No Xanthelasma] : the eyelids demonstrated no xanthelasmas [Normal Oral Mucosa] : normal oral mucosa [No Oral Pallor] : no oral pallor [No Oral Cyanosis] : no oral cyanosis [Normal Jugular Venous A Waves Present] : normal jugular venous A waves present [Normal Jugular Venous V Waves Present] : normal jugular venous V waves present [No Jugular Venous Lopez A Waves] : no jugular venous lopez A waves [Respiration, Rhythm And Depth] : normal respiratory rhythm and effort [Exaggerated Use Of Accessory Muscles For Inspiration] : no accessory muscle use [Auscultation Breath Sounds / Voice Sounds] : lungs were clear to auscultation bilaterally [Abdomen Soft] : soft [Abdomen Tenderness] : non-tender [Abdomen Mass (___ Cm)] : no abdominal mass palpated [Abnormal Walk] : normal gait [Nail Clubbing] : no clubbing of the fingernails [Gait - Sufficient For Exercise Testing] : the gait was sufficient for exercise testing [Cyanosis, Localized] : no localized cyanosis [Petechial Hemorrhages (___cm)] : no petechial hemorrhages [] : no ischemic changes [Skin Color & Pigmentation] : normal skin color and pigmentation [Oriented To Time, Place, And Person] : oriented to person, place, and time

## 2019-04-20 NOTE — ASSESSMENT
[FreeTextEntry1] : 73 year old female with newly diagnosed right sided breast cancer with history of hypertension and hyperlipidemia. \par Repeat echo today is stable and unchanged.\par \par PLAN\par Continue with Coreg 25 mg BID for cardioprotection and blood pressure management\par c/w Rosuvastatin for cholesterol treatment. Recent lipid profile demonstrated elevated LDL and total cholesterol. \par follow

## 2019-04-22 ENCOUNTER — LABORATORY RESULT (OUTPATIENT)
Age: 74
End: 2019-04-22

## 2019-04-22 ENCOUNTER — RESULT REVIEW (OUTPATIENT)
Age: 74
End: 2019-04-22

## 2019-04-22 ENCOUNTER — APPOINTMENT (OUTPATIENT)
Age: 74
End: 2019-04-22

## 2019-04-22 LAB
BASOPHILS # BLD AUTO: 0 K/UL — SIGNIFICANT CHANGE UP (ref 0–0.2)
BASOPHILS NFR BLD AUTO: 0.1 % — SIGNIFICANT CHANGE UP (ref 0–2)
BUN SERPL-MCNC: 25 MG/DL — HIGH (ref 7–23)
CA-I BLDA-SCNC: 1.18 MMOL/L — SIGNIFICANT CHANGE UP (ref 1.12–1.3)
CHLORIDE SERPL-SCNC: 98 MMOL/L — SIGNIFICANT CHANGE UP (ref 96–108)
CO2 SERPL-SCNC: 27 MMOL/L — SIGNIFICANT CHANGE UP (ref 22–31)
CREAT SERPL-MCNC: 1 MG/DL — SIGNIFICANT CHANGE UP (ref 0.5–1.3)
EOSINOPHIL # BLD AUTO: 0.2 K/UL — SIGNIFICANT CHANGE UP (ref 0–0.5)
EOSINOPHIL NFR BLD AUTO: 2.7 % — SIGNIFICANT CHANGE UP (ref 0–6)
GLUCOSE SERPL-MCNC: 156 MG/DL — HIGH (ref 70–99)
HCT VFR BLD CALC: 32.2 % — LOW (ref 34.5–45)
HGB BLD-MCNC: 11.9 G/DL — SIGNIFICANT CHANGE UP (ref 11.5–15.5)
LYMPHOCYTES # BLD AUTO: 1 K/UL — SIGNIFICANT CHANGE UP (ref 1–3.3)
LYMPHOCYTES # BLD AUTO: 10.4 % — LOW (ref 13–44)
MCHC RBC-ENTMCNC: 34 PG — SIGNIFICANT CHANGE UP (ref 27–34)
MCHC RBC-ENTMCNC: 36.8 G/DL — HIGH (ref 32–36)
MCV RBC AUTO: 92.4 FL — SIGNIFICANT CHANGE UP (ref 80–100)
MONOCYTES # BLD AUTO: 0.6 K/UL — SIGNIFICANT CHANGE UP (ref 0–0.9)
MONOCYTES NFR BLD AUTO: 7 % — SIGNIFICANT CHANGE UP (ref 2–14)
NEUTROPHILS # BLD AUTO: 7.4 K/UL — SIGNIFICANT CHANGE UP (ref 1.8–7.4)
NEUTROPHILS NFR BLD AUTO: 79.9 % — HIGH (ref 43–77)
PLATELET # BLD AUTO: 219 K/UL — SIGNIFICANT CHANGE UP (ref 150–400)
POTASSIUM SERPL-MCNC: 3.3 MMOL/L — LOW (ref 3.5–5.3)
POTASSIUM SERPL-SCNC: 3.3 MMOL/L — LOW (ref 3.5–5.3)
RBC # BLD: 3.48 M/UL — LOW (ref 3.8–5.2)
RBC # FLD: 14.8 % — HIGH (ref 10.3–14.5)
SODIUM SERPL-SCNC: 136 MMOL/L — SIGNIFICANT CHANGE UP (ref 135–145)
WBC # BLD: 9.2 K/UL — SIGNIFICANT CHANGE UP (ref 3.8–10.5)
WBC # FLD AUTO: 9.2 K/UL — SIGNIFICANT CHANGE UP (ref 3.8–10.5)

## 2019-04-23 DIAGNOSIS — E86.0 DEHYDRATION: ICD-10-CM

## 2019-04-23 DIAGNOSIS — Z51.89 ENCOUNTER FOR OTHER SPECIFIED AFTERCARE: ICD-10-CM

## 2019-04-23 NOTE — PHYSICAL EXAM
[Fully active, able to carry on all pre-disease performance without restriction] : Status 0 - Fully active, able to carry on all pre-disease performance without restriction [Normal] : affect appropriate [de-identified] : Diffuse purple-red papules - less prominent/fewer

## 2019-04-23 NOTE — HISTORY OF PRESENT ILLNESS
[Disease: _____________________] : Disease: [unfilled] [N: ___] : N[unfilled] [T: ___] : T[unfilled] [AJCC Stage: ____] : AJCC Stage: [unfilled] [Treatment Protocol] : Treatment Protocol [de-identified] : 72yo woman presenting for medical oncology consultation.\par \par Last mammogram . She has no history of abnormal breast imaging, breast biopsies, or surgeries.\par \par She saw her PMD who sent her for a screening mammogram after she palpated a mass on the R side of her chest 2018. She had been having body aches/soreness and feeling general malaise since November, despite a course of antibiotics (just after flu shot administered). Also some R shoulder/arm pains.\par \par 18 Mammogram screenin.7cm irregular spiculated mass upper outer right breast 8cm FN with associated architectural distortion and several associated microcalcifications. Partially included enlarged right axillary lymph nodes. BIRADS 0\par \par 18 bilateral breast US: R breast 10:00 7cm FN 2.6cm irregular hypoechoic mass (biopsy recommended). Inferior right axillary lymph node with focal area of eccentric cortical thickening (recommend US guided biopsy). 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 Michael 7/9. 1.4cm involved, DCIS cribiform pattern with high grade nuclear atypia and apocrine cytology very focally present. (coil shaped clip) R axillary lymph node: metastatic ductal carcinoma with apocrine cytology involving a lymph node (hydromark marking clip) ER 0% OH 0% HER2 IHC 0 negative\par \par She saw Dr. Demetrio Robertson yesterday who recommended medical oncology consultation for neoadjuvant chemotherapy. \par \par She noted on intial visit with me some occasional body aches still and fatigue over the last month. She notes an intermittent tongue sensation - like a scald from food (though she does not recall an episode of this). Symptoms intermittent for months. Otherwise she feels well. She is active at baseline, uses stationary bike at home most days for exercise. \par \par 19 PET/CT: Superolateral R breast and R axillary lymph nodes FDG-avid. S/p left nephrectomy. Few subcm b/l pulmonary nodules are nonspecific. Please correlate clinically for infection and follow up with CT chest in 1 month.\par \par TTE 19: EF 64%, GLS -20 Wall motion abnormality in inferolateral wall reported, EKG stable. Patient seen by Dr. Lorrie Yadav (Cardiology) - TTE reviewed without significant wall motion abnormalities, antihypertensives changed \par \par MRI Brain w/wo contrast 19: L maxillary polyp v. retention cyst. Non enhancing area of abnormal T2 prolongation involving left thalamic region, nonspecific.\par \par CT Chest 19: No interval changes since 19. Stable 3.3x2.1cm UOQ R breast, 2x1.2cm R axillary lymph node. Stable 4mm and smaller scattered indeterminant pulmonary nodules.\par \par She started neoadjuvant chemotherapy with AC on 19. Taxol #1 3/27.\par Taxol #2 with grade 3 infusion reaction, vasculitic rash development. Taxol reattempt with similar. Taxotere attempt  with chest discomfort, acute worsening of rash/itching, severe back pain radiating into the chest, a few minutes into infusion.\par \par Pertinent History:\par No family history of breast or ovarian cancer.\par HTN - saw Cardiologist 2018, stress test normal per patient, Dr. Samaniego [facility], sees him annually. BP well controlled generally \par HLD \par Single kidney (kidney donor to brother in ) - last Cr 1.32 (18) though patient notes no prior renal issues\par Transaminitis AST 43, ALT 54 (18), no prior history\par BCC of skin removed\par Hysterectomy for fibroid uterus\par Cscope last ~5 years ago, no polyps per patient\par PMD Lance Lafleur\par Single, lives with her son Richard. She has a daughter as well who works at [employer]. She is a retired RN. Planning a trip to [facility] with her boyfriend in May that she is postponing. [de-identified] : ER-WA-HER2- [de-identified] : Patient presents today for follow up to further discuss treatment options. Breast US performed today with response to treatment noted. She notes concerns regarding continuing any therapy. [FreeTextEntry1] : Neoadjuvant Adriamycin 60mg/m2 + Cytoxan 600mg/m2 every 14 days x 4 cycles with Neulasta OnPro support (Day 1), to be followed by Taxol 80mg/m2 weekly for 12 doses

## 2019-04-23 NOTE — ASSESSMENT
[FreeTextEntry1] : 74yo woman with history of HTN on multiple medications, kidney donor/CKD, ER-AR-HER2-, lymph-node positive right breast cancer anatomic stage IIB, AJCC 8th edition clinical prognostic stage IIIB, on neoadjuvant chemotherapy (dose-dense AC-->T) presenting for follow up. Course c/b neuropathy and a new rash after taxol, infusion reactions to both taxol and taxotere very quickly into infusion initiation. CT chest for cough 4/2019 with stable 3mm pulm nodules, decreased size of breast mass and axillary LN; US 4/17 with decrease in size of breast mass and axillary LN.\par \par Breast Cancer:\par -extensive discussion today regarding taxane hypersensitivity reaction - recommendation to not proceed further with taxane based treatment given significant risk for anaphylaxis - patient tells me she would refuse this therapy regardless of recommendation. Therefore discussed options for further neoadjuvant chemotherapy that may offer the best opportunity for achieving pathologic complete response (as residual disease seems apparent s/p AC x 4 and taxol 80mg/m2 x 1 dose, based on US imaging last week). Recommend cautious trial of IV CMF chemotherapy with neulasta support (and close monitoring of renal function, hydration) every 2 weeks x 4 cycles, as tolerated. Discussed risks/benefits of therapy, potential side effects including but not limited to increased risk of leukemia that is treatment related (particularly after AC chemotherapy), myelosuppression potentially leading to life-threatening infection or bleeding requiring blood or platelet transfusion, mucositis, pulmonary toxicity, fatigue, hair thinning, nail and skin changes/rash, nausea and vomiting, heartburn, diarrhea, urinary discomfort or cystitis, kidney or liver damage, amongst others all discussed. All of the patient's questions were answered and she provided both written and verbal consent to proceed with therapy - scheduled ASAP for Monday\par -check BMP today\par -follow up ~4 weeks scheduled with patient's surgeon Dr. Robertson, will plan for repeat breast US just prior to that visit\par -MRI Breasts deferred; she notes that she is planning b/l mastectomy surgery\par -Reglan PRN for nausea, EMLA cream use for Mediport treatments\par -interval repeat MRI brain planned - ordered, patient deferred scheduling until now, readress\par -patient's best contact home number: [phone], [phone]\par -patient was instructed to go to ER immediately for fever >100.4 or any concerning symptoms, signs of recurrent allergic reactions, call MD line 24/7 with questions or concerns\par \par Hyperglycemia: in the setting of steroids, likely underlying insulin resistance/DMII undiagnosed. steroids now discontinued\par -hydration encouraged, continue to monitor\par -patient purchased glucometer and will check FS glucose at home, call (downtrending <300)\par -patient encouraged to see PMD for further workup within the week Dr. Lafleur - scheduled appointment next week\par -check Hgb A1C\par \par Neuropathy: peripheral, grade 2, some functional deficits, acute after first dose of taxol, now grade 1 improved\par -hold treatment as above, continue to monitor\par \par Rash: unclear etiology, raised, erythematous, diffuse, not significantly improved with hydrocortisone topical\par -dermatology evaluation by Dr. Mujica in office last week - LEs consistent with small vessel vasculitis, unlikely related to taxol, possible drug eruption appearance at some lesions. ANCAs, ASO negative\par -betamethasone ointment BID eprescribed per Dr. Mujica recommendations; follow up with him next week scheduled\par \par Anemia: Hgb 8, normocytic, suspect AC related, improved to >10 now\par -continue to monitor, asymptomatic\par \par HTN: \par -follow up Dr. Yadav with repeat TTE planned 5/1\par -continue new antihypertensives and home BP monitoring per her recommendations (BP well controlled currently)\par

## 2019-04-24 ENCOUNTER — RESULT REVIEW (OUTPATIENT)
Age: 74
End: 2019-04-24

## 2019-04-24 ENCOUNTER — APPOINTMENT (OUTPATIENT)
Dept: DERMATOLOGY | Facility: CLINIC | Age: 74
End: 2019-04-24
Payer: MEDICARE

## 2019-04-24 ENCOUNTER — APPOINTMENT (OUTPATIENT)
Dept: HEMATOLOGY ONCOLOGY | Facility: CLINIC | Age: 74
End: 2019-04-24

## 2019-04-24 ENCOUNTER — APPOINTMENT (OUTPATIENT)
Dept: INFUSION THERAPY | Facility: HOSPITAL | Age: 74
End: 2019-04-24

## 2019-04-24 VITALS
DIASTOLIC BLOOD PRESSURE: 82 MMHG | SYSTOLIC BLOOD PRESSURE: 126 MMHG | BODY MASS INDEX: 32.82 KG/M2 | HEIGHT: 65 IN | WEIGHT: 197 LBS

## 2019-04-24 LAB
ALBUMIN SERPL ELPH-MCNC: 4 G/DL
ALP BLD-CCNC: 49 U/L
ALT SERPL-CCNC: 30 U/L
ANION GAP SERPL CALC-SCNC: 14 MMOL/L
AST SERPL-CCNC: 19 U/L
BASOPHILS # BLD AUTO: 0 K/UL — SIGNIFICANT CHANGE UP (ref 0–0.2)
BILIRUB SERPL-MCNC: 0.6 MG/DL
BUN SERPL-MCNC: 27 MG/DL
CALCIUM SERPL-MCNC: 9.6 MG/DL
CHLORIDE SERPL-SCNC: 101 MMOL/L
CO2 SERPL-SCNC: 24 MMOL/L
CREAT SERPL-MCNC: 0.92 MG/DL
EOSINOPHIL # BLD AUTO: 0 K/UL — SIGNIFICANT CHANGE UP (ref 0–0.5)
GLUCOSE SERPL-MCNC: 194 MG/DL
HCT VFR BLD CALC: 31.4 % — LOW (ref 34.5–45)
HGB BLD-MCNC: 10.6 G/DL — LOW (ref 11.5–15.5)
LYMPHOCYTES # BLD AUTO: 1.1 K/UL — SIGNIFICANT CHANGE UP (ref 1–3.3)
LYMPHOCYTES # BLD AUTO: 4 % — LOW (ref 13–44)
MCHC RBC-ENTMCNC: 31.5 PG — SIGNIFICANT CHANGE UP (ref 27–34)
MCHC RBC-ENTMCNC: 33.7 G/DL — SIGNIFICANT CHANGE UP (ref 32–36)
MCV RBC AUTO: 93.6 FL — SIGNIFICANT CHANGE UP (ref 80–100)
MONOCYTES # BLD AUTO: 0.9 K/UL — SIGNIFICANT CHANGE UP (ref 0–0.9)
MONOCYTES NFR BLD AUTO: 3 % — SIGNIFICANT CHANGE UP (ref 2–14)
NEUTROPHILS # BLD AUTO: 38.9 K/UL — HIGH (ref 1.8–7.4)
NEUTROPHILS NFR BLD AUTO: 93 % — HIGH (ref 43–77)
PLAT MORPH BLD: NORMAL — SIGNIFICANT CHANGE UP
PLATELET # BLD AUTO: 195 K/UL — SIGNIFICANT CHANGE UP (ref 150–400)
POTASSIUM SERPL-SCNC: 3.5 MMOL/L
PROT SERPL-MCNC: 5.8 G/DL
RBC # BLD: 3.35 M/UL — LOW (ref 3.8–5.2)
RBC # FLD: 14.7 % — HIGH (ref 10.3–14.5)
RBC BLD AUTO: SIGNIFICANT CHANGE UP
SODIUM SERPL-SCNC: 139 MMOL/L
WBC # BLD: 40.9 K/UL — CRITICAL HIGH (ref 3.8–10.5)
WBC # FLD AUTO: 40.9 K/UL — CRITICAL HIGH (ref 3.8–10.5)

## 2019-04-24 PROCEDURE — 99203 OFFICE O/P NEW LOW 30 MIN: CPT

## 2019-04-24 NOTE — CONSULT LETTER

## 2019-04-24 NOTE — PHYSICAL EXAM
[Oriented x 3] : ~L oriented x 3 [Well Nourished] : well nourished [Alert] : alert [No Visual Lymphadenopathy] : no visual  lymphadenopathy [Conjunctiva Non-injected] : conjunctiva non-injected [No Clubbing] : no clubbing [No Bromhidrosis] : no bromhidrosis [No Edema] : no edema [No Chromhidrosis] : no chromhidrosis [FreeTextEntry3] : Pink brown patches on the upper and lower extremities. NO petechiae or purpura

## 2019-04-24 NOTE — HISTORY OF PRESENT ILLNESS
[FreeTextEntry1] : Rash [de-identified] : 73F with a hx of Stage IIB Breast CA s/p dose-dense AC --> T here for rash 2/2 to taxol. Noted to have developed a burning rash on the UE and LE on 3/29 after her first dose of taxol. Was seen by an outside dermatologist and given HC without improvement. Received another dose of Taxol and the rash recurred along with chest pressure and SOB. At that time, she was started on betamethasone cream for her rash which helped; also had received dexamethasone x 3 days around infusion which helped. Was trialed on taxotere on 4/17/19 and noted recurrence of rash which once again improved with dexamethasone. Since then rash has been slowly fading. No further burning. Using betamethasone cream.  Planning to start CMF.

## 2019-05-02 ENCOUNTER — APPOINTMENT (OUTPATIENT)
Dept: INTERNAL MEDICINE | Facility: CLINIC | Age: 74
End: 2019-05-02
Payer: MEDICARE

## 2019-05-02 VITALS
SYSTOLIC BLOOD PRESSURE: 120 MMHG | BODY MASS INDEX: 31.66 KG/M2 | WEIGHT: 197 LBS | HEART RATE: 84 BPM | HEIGHT: 66 IN | DIASTOLIC BLOOD PRESSURE: 80 MMHG | RESPIRATION RATE: 15 BRPM

## 2019-05-02 PROCEDURE — 99214 OFFICE O/P EST MOD 30 MIN: CPT

## 2019-05-02 NOTE — PHYSICAL EXAM
[No Acute Distress] : no acute distress [Well Nourished] : well nourished [Well Developed] : well developed [Well-Appearing] : well-appearing [EOMI] : extraocular movements intact [PERRL] : pupils equal round and reactive to light [Normal Sclera/Conjunctiva] : normal sclera/conjunctiva [Normal Outer Ear/Nose] : the outer ears and nose were normal in appearance [Normal Oropharynx] : the oropharynx was normal [No JVD] : no jugular venous distention [Supple] : supple [No Lymphadenopathy] : no lymphadenopathy [No Respiratory Distress] : no respiratory distress  [Thyroid Normal, No Nodules] : the thyroid was normal and there were no nodules present [No Accessory Muscle Use] : no accessory muscle use [Clear to Auscultation] : lungs were clear to auscultation bilaterally [Regular Rhythm] : with a regular rhythm [Normal Rate] : normal rate  [Normal S1, S2] : normal S1 and S2 [No Murmur] : no murmur heard [No Abdominal Bruit] : a ~M bruit was not heard ~T in the abdomen [No Carotid Bruits] : no carotid bruits [Pedal Pulses Present] : the pedal pulses are present [No Edema] : there was no peripheral edema [Soft] : abdomen soft [Non Tender] : non-tender [Non-distended] : non-distended [No Masses] : no abdominal mass palpated [Normal Bowel Sounds] : normal bowel sounds [No HSM] : no HSM [Normal Axillary Nodes] : no axillary lymphadenopathy [Normal Posterior Cervical Nodes] : no posterior cervical lymphadenopathy [Normal Anterior Cervical Nodes] : no anterior cervical lymphadenopathy [No CVA Tenderness] : no CVA  tenderness [No Spinal Tenderness] : no spinal tenderness [No Joint Swelling] : no joint swelling [Grossly Normal Strength/Tone] : grossly normal strength/tone [Normal Gait] : normal gait [Coordination Grossly Intact] : coordination grossly intact [No Focal Deficits] : no focal deficits [Speech Grossly Normal] : speech grossly normal [Normal Affect] : the affect was normal [Memory Grossly Normal] : memory grossly normal [Normal Mood] : the mood was normal [Alert and Oriented x3] : oriented to person, place, and time [Normal Insight/Judgement] : insight and judgment were intact

## 2019-05-02 NOTE — REVIEW OF SYSTEMS
[Joint Pain] : joint pain [Diarrhea] : diarrhea [Muscle Pain] : muscle pain [Skin Rash] : skin rash [Negative] : Heme/Lymph [Chills] : no chills [Fever] : no fever [Fatigue] : no fatigue [Hot Flashes] : no hot flashes [Night Sweats] : no night sweats [Recent Change In Weight] : ~T no recent weight change [Earache] : no earache [Hearing Loss] : no hearing loss [Nosebleed] : no nosebleeds [Hoarseness] : no hoarseness [Sore Throat] : no sore throat [Nasal Discharge] : no nasal discharge [Postnasal Drip] : no postnasal drip [FreeTextEntry4] : mouth soreness [FreeTextEntry7] : altered taste, occ diarrhea from the chemo [FreeTextEntry9] : aches improved as noted.

## 2019-05-02 NOTE — HISTORY OF PRESENT ILLNESS
[FreeTextEntry1] : pain, htn, breast cancer, hyperglycemia [de-identified] : 72 y/o female with a hx of HTN , kidney donor, breast mass and lad on mammo/sono \par here for f/u \par Had reactions to chemotx with oncology.  Had received doses of steroids.  \par Reports that she has been getting steroids since she has been getting the chemo.\par Reports that the blood sugar was very high with onc.  Had started to go down.  Getting in the 150's \par Has been following with cardiology.\par BP has been controlled.  \par appetite has been altered by the chemo.  \par trying to go on the exercise bike and walk around the block\par Aches have been improved.  \par With some side effects from the chemo.  sores at the lip.  Had rash and reaction from the chemo as noted.  \par \par mammo/sono 12/18 - mass and axillary node - for bx.\par pap overdue.\par \par Had flu vaccine\par had prevnar

## 2019-05-05 ENCOUNTER — FORM ENCOUNTER (OUTPATIENT)
Age: 74
End: 2019-05-05

## 2019-05-06 ENCOUNTER — OUTPATIENT (OUTPATIENT)
Dept: OUTPATIENT SERVICES | Facility: HOSPITAL | Age: 74
LOS: 1 days | End: 2019-05-06
Payer: MEDICARE

## 2019-05-06 ENCOUNTER — OUTPATIENT (OUTPATIENT)
Dept: OUTPATIENT SERVICES | Facility: HOSPITAL | Age: 74
LOS: 1 days | Discharge: HOME | End: 2019-05-06

## 2019-05-06 ENCOUNTER — APPOINTMENT (OUTPATIENT)
Dept: MRI IMAGING | Facility: IMAGING CENTER | Age: 74
End: 2019-05-06
Payer: MEDICARE

## 2019-05-06 DIAGNOSIS — Z90.5 ACQUIRED ABSENCE OF KIDNEY: Chronic | ICD-10-CM

## 2019-05-06 DIAGNOSIS — C50.919 MALIGNANT NEOPLASM OF UNSPECIFIED SITE OF UNSPECIFIED FEMALE BREAST: ICD-10-CM

## 2019-05-06 DIAGNOSIS — Z90.711 ACQUIRED ABSENCE OF UTERUS WITH REMAINING CERVICAL STUMP: Chronic | ICD-10-CM

## 2019-05-06 DIAGNOSIS — Z98.891 HISTORY OF UTERINE SCAR FROM PREVIOUS SURGERY: Chronic | ICD-10-CM

## 2019-05-06 PROCEDURE — A9585: CPT

## 2019-05-06 PROCEDURE — 70553 MRI BRAIN STEM W/O & W/DYE: CPT | Mod: 26

## 2019-05-06 PROCEDURE — 70553 MRI BRAIN STEM W/O & W/DYE: CPT

## 2019-05-08 ENCOUNTER — APPOINTMENT (OUTPATIENT)
Age: 74
End: 2019-05-08

## 2019-05-08 ENCOUNTER — RESULT REVIEW (OUTPATIENT)
Age: 74
End: 2019-05-08

## 2019-05-08 ENCOUNTER — LABORATORY RESULT (OUTPATIENT)
Age: 74
End: 2019-05-08

## 2019-05-08 ENCOUNTER — APPOINTMENT (OUTPATIENT)
Age: 74
End: 2019-05-08
Payer: MEDICARE

## 2019-05-08 VITALS
RESPIRATION RATE: 16 BRPM | BODY MASS INDEX: 31.8 KG/M2 | OXYGEN SATURATION: 98 % | WEIGHT: 197 LBS | SYSTOLIC BLOOD PRESSURE: 133 MMHG | TEMPERATURE: 98.1 F | DIASTOLIC BLOOD PRESSURE: 81 MMHG | HEART RATE: 80 BPM

## 2019-05-08 LAB
BASOPHILS # BLD AUTO: 0 K/UL — SIGNIFICANT CHANGE UP (ref 0–0.2)
BASOPHILS NFR BLD AUTO: 0.1 % — SIGNIFICANT CHANGE UP (ref 0–2)
BUN SERPL-MCNC: 18 MG/DL — SIGNIFICANT CHANGE UP (ref 7–23)
CA-I BLDA-SCNC: 1.2 MMOL/L — SIGNIFICANT CHANGE UP (ref 1.12–1.3)
CHLORIDE SERPL-SCNC: 102 MMOL/L — SIGNIFICANT CHANGE UP (ref 96–108)
CO2 SERPL-SCNC: 25 MMOL/L — SIGNIFICANT CHANGE UP (ref 22–31)
CREAT SERPL-MCNC: 1 MG/DL — SIGNIFICANT CHANGE UP (ref 0.5–1.3)
EOSINOPHIL # BLD AUTO: 0.2 K/UL — SIGNIFICANT CHANGE UP (ref 0–0.5)
EOSINOPHIL NFR BLD AUTO: 1.8 % — SIGNIFICANT CHANGE UP (ref 0–6)
GLUCOSE SERPL-MCNC: 198 MG/DL — HIGH (ref 70–99)
HCT VFR BLD CALC: 32 % — LOW (ref 34.5–45)
HGB BLD-MCNC: 10.1 G/DL — LOW (ref 11.5–15.5)
LYMPHOCYTES # BLD AUTO: 1 K/UL — SIGNIFICANT CHANGE UP (ref 1–3.3)
LYMPHOCYTES # BLD AUTO: 11 % — LOW (ref 13–44)
MCHC RBC-ENTMCNC: 29.1 PG — SIGNIFICANT CHANGE UP (ref 27–34)
MCHC RBC-ENTMCNC: 31.6 G/DL — LOW (ref 32–36)
MCV RBC AUTO: 92.2 FL — SIGNIFICANT CHANGE UP (ref 80–100)
MONOCYTES # BLD AUTO: 0.6 K/UL — SIGNIFICANT CHANGE UP (ref 0–0.9)
MONOCYTES NFR BLD AUTO: 7 % — SIGNIFICANT CHANGE UP (ref 2–14)
NEUTROPHILS # BLD AUTO: 7.4 K/UL — SIGNIFICANT CHANGE UP (ref 1.8–7.4)
NEUTROPHILS NFR BLD AUTO: 80 % — HIGH (ref 43–77)
PLATELET # BLD AUTO: 196 K/UL — SIGNIFICANT CHANGE UP (ref 150–400)
POTASSIUM SERPL-MCNC: 3 MMOL/L — LOW (ref 3.5–5.3)
POTASSIUM SERPL-SCNC: 3 MMOL/L — LOW (ref 3.5–5.3)
RBC # BLD: 3.47 M/UL — LOW (ref 3.8–5.2)
RBC # FLD: 13.9 % — SIGNIFICANT CHANGE UP (ref 10.3–14.5)
SODIUM SERPL-SCNC: 140 MMOL/L — SIGNIFICANT CHANGE UP (ref 135–145)
WBC # BLD: 9.3 K/UL — SIGNIFICANT CHANGE UP (ref 3.8–10.5)
WBC # FLD AUTO: 9.3 K/UL — SIGNIFICANT CHANGE UP (ref 3.8–10.5)

## 2019-05-08 PROCEDURE — 99215 OFFICE O/P EST HI 40 MIN: CPT

## 2019-05-08 RX ORDER — ALBUTEROL SULFATE 90 UG/1
108 (90 BASE) INHALANT RESPIRATORY (INHALATION)
Qty: 1 | Refills: 0 | Status: DISCONTINUED | COMMUNITY
Start: 2019-03-26 | End: 2019-05-08

## 2019-05-08 RX ORDER — HYDROCODONE BITARTRATE AND HOMATROPINE METHYLBROMIDE 5; 1.5 MG/5ML; MG/5ML
5-1.5 SYRUP ORAL
Qty: 100 | Refills: 0 | Status: DISCONTINUED | COMMUNITY
Start: 2019-03-10 | End: 2019-05-08

## 2019-05-08 RX ORDER — DEXAMETHASONE 4 MG/1
4 TABLET ORAL TWICE DAILY
Qty: 60 | Refills: 0 | Status: DISCONTINUED | COMMUNITY
Start: 2019-04-15 | End: 2019-05-08

## 2019-05-08 RX ORDER — LORAZEPAM 0.5 MG/1
0.5 TABLET ORAL DAILY
Qty: 5 | Refills: 0 | Status: DISCONTINUED | COMMUNITY
Start: 2019-01-24 | End: 2019-05-08

## 2019-05-08 NOTE — PHYSICAL EXAM
[Fully active, able to carry on all pre-disease performance without restriction] : Status 0 - Fully active, able to carry on all pre-disease performance without restriction [de-identified] : R breast faint thickening amorphous RUOQ, no masses or adenopathy palpable otherwise [de-identified] : alopecia - some hairs growing back [Normal] : grossly intact [de-identified] : Diffuse purple-red papules - less prominent/fewer

## 2019-05-08 NOTE — ASSESSMENT
[FreeTextEntry1] : 72yo woman with history of HTN on multiple medications, kidney donor/CKD, ER-OR-HER2-, lymph-node positive right breast cancer anatomic stage IIB, AJCC 8th edition clinical prognostic stage IIIB, on neoadjuvant chemotherapy (dose-dense AC-->T) presenting for follow up. Course c/b neuropathy and a new rash after taxol, infusion reactions to both taxol and taxotere very quickly into infusion initiation. CT chest for cough 4/2019 with stable 3mm pulm nodules, decreased size of breast mass and axillary LN; US 4/17 with decrease in size of breast mass and axillary LN. Started CMF with OnPro q2 weeks on 4/24 without event, tolerating well.\par \par Breast Cancer: neuropathy resolved, tolerated CMF without infusion reactions\par -continue CMF C2, plan for 4 cycles as tolerated\par -check stat BMP today before treatment given CKD\par -follow up patient's surgeon Dr. Robertson scheduled 6/4/2019, will plan for repeat breast US just prior to that visit\par -MRI Breasts deferred; she notes that she is planning b/l mastectomy surgery\par -Reglan PRN for nausea, EMLA cream use for Mediport treatments\par -patient's best contact home number: [phone], [phone]\par -patient was instructed to go to ER immediately for fever >100.4 or any concerning symptoms, signs of recurrent allergic reactions, call MD line 24/7 with questions or concerns\par \par Hyperglycemia: in the setting of steroids, likely underlying insulin resistance/DMII undiagnosed. steroids now discontinued (though some given with each CMF dose)\par -hydration encouraged, continue to monitor\par -patient will continue to check FS glucose at home, call (downtrending <300)\par -follow up PMD end of the month, to consider oral hypoglycemic agents\par \par MRI Brain findings: stable, nonspecific enhancement on repeat brain MRI\par -reviewed with Dr. Jacobs - continue treatment, follow up MRI Brain within a few months and establish care/follow up with Dr. Jacobs - patient amenable and will call to schedule consultation\par \par Rash: unclear etiology, raised, erythematous, diffuse, not significantly improved with hydrocortisone topical\par -dermatology evaluation by Dr. 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 drug eruption now fully resolved\par -betamethasone ointment BID eprescribed per Dr. Mujica recommendations; follow up with him\par \par Anemia: Hgb 8, normocytic, suspect AC related, improved to >10 now\par -continue to monitor, asymptomatic\par \par HTN: \par -follow up Dr. Yadav with repeat TTE planned 5/1\par -continue new antihypertensives, coreg, and home BP monitoring per her recommendations (BP well controlled currently)\par

## 2019-05-08 NOTE — HISTORY OF PRESENT ILLNESS
[Disease: _____________________] : Disease: [unfilled] [T: ___] : T[unfilled] [AJCC Stage: ____] : AJCC Stage: [unfilled] [N: ___] : N[unfilled] [Treatment Protocol] : Treatment Protocol [de-identified] : 72yo woman presenting for medical oncology consultation.\par \par Last mammogram . She has no history of abnormal breast imaging, breast biopsies, or surgeries.\par \par She saw her PMD who sent her for a screening mammogram after she palpated a mass on the R side of her chest 2018. She had been having body aches/soreness and feeling general malaise since November, despite a course of antibiotics (just after flu shot administered). Also some R shoulder/arm pains.\par \par 18 Mammogram screenin.7cm irregular spiculated mass upper outer right breast 8cm FN with associated architectural distortion and several associated microcalcifications. Partially included enlarged right axillary lymph nodes. BIRADS 0\par \par 18 bilateral breast US: R breast 10:00 7cm FN 2.6cm irregular hypoechoic mass (biopsy recommended). Inferior right axillary lymph node with focal area of eccentric cortical thickening (recommend US guided biopsy). 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 Michael 7/9. 1.4cm involved, DCIS cribiform pattern with high grade nuclear atypia and apocrine cytology very focally present. (coil shaped clip) R axillary lymph node: metastatic ductal carcinoma with apocrine cytology involving a lymph node (hydromark marking clip) ER 0% AL 0% HER2 IHC 0 negative\par \par She saw Dr. Demetrio Robertson yesterday who recommended medical oncology consultation for neoadjuvant chemotherapy. \par \par She noted on intial visit with me some occasional body aches still and fatigue over the last month. She notes an intermittent tongue sensation - like a scald from food (though she does not recall an episode of this). Symptoms intermittent for months. Otherwise she feels well. She is active at baseline, uses stationary bike at home most days for exercise. \par \par 19 PET/CT: Superolateral R breast and R axillary lymph nodes FDG-avid. S/p left nephrectomy. Few subcm b/l pulmonary nodules are nonspecific. Please correlate clinically for infection and follow up with CT chest in 1 month.\par \par TTE 19: EF 64%, GLS -20 Wall motion abnormality in inferolateral wall reported, EKG stable. Patient seen by Dr. Lorrie Yadav (Cardiology) - TTE reviewed without significant wall motion abnormalities, antihypertensives changed \par \par MRI Brain w/wo contrast 19: L maxillary polyp v. retention cyst. Non enhancing area of abnormal T2 prolongation involving left thalamic region, nonspecific.\par \par CT Chest 19: No interval changes since 19. Stable 3.3x2.1cm UOQ R breast, 2x1.2cm R axillary lymph node. Stable 4mm and smaller scattered indeterminant pulmonary nodules.\par \par She started neoadjuvant chemotherapy with AC on 19. Taxol #1 3/27.\par Taxol #2 with grade 3 infusion reaction, vasculitic rash development. Taxol reattempt with similar. Taxotere attempt  with chest discomfort, acute worsening of rash/itching, severe back pain radiating into the chest, a few minutes into infusion. Breast US performed with response to therapy noted.\par Also c/b hyperglycemia (A1C 8.0) - initiated home glucose monitoring and PMD follow up.\par \par CMF #1 with neulasta onpro started  without event\par \par Pertinent History:\par No family history of breast or ovarian cancer.\par HTN - saw Cardiologist 2018, stress test normal per patient, Dr. Samaniego [facility], sees him annually. BP well controlled generally \par HLD \par Single kidney (kidney donor to brother in ) - baseline Cr 1.2-1.3 though patient notes no prior renal issues\par Transaminitis AST 43, ALT 54 (18), no prior history, resolved since beginning treatment\par BCC of skin removed\par Hysterectomy for fibroid uterus\par Cscope last ~5 years ago, no polyps per patient\par PMD Lance Lafleur\par Single, lives with her son Richard. She has a daughter as well who works at [employer]. She is a retired RN.  [de-identified] : ER-HI-HER2- [FreeTextEntry1] : Neoadjuvant Adriamycin 60mg/m2 + Cytoxan 600mg/m2 every 14 days x 4 cycles with Neulasta OnPro support (Day 1), to be followed by Taxol 80mg/m2 weekly for 12 doses [de-identified] : Patient presents today for follow up. She notes dry mouth, trying to hydrate more, fingerstick glucose has been "all over the place." She saw her PMD who recommended medication for DMII (A1C 8.) - she has elected to try diet and exercise (used the bike for 40 min yesterday), plan for fructosamine check at the end of the month. She notes some sinus irritation lately.

## 2019-05-09 DIAGNOSIS — R11.2 NAUSEA WITH VOMITING, UNSPECIFIED: ICD-10-CM

## 2019-05-09 DIAGNOSIS — Z51.11 ENCOUNTER FOR ANTINEOPLASTIC CHEMOTHERAPY: ICD-10-CM

## 2019-05-09 DIAGNOSIS — E86.0 DEHYDRATION: ICD-10-CM

## 2019-05-09 DIAGNOSIS — Z51.89 ENCOUNTER FOR OTHER SPECIFIED AFTERCARE: ICD-10-CM

## 2019-05-17 ENCOUNTER — APPOINTMENT (OUTPATIENT)
Dept: MRI IMAGING | Facility: IMAGING CENTER | Age: 74
End: 2019-05-17

## 2019-05-22 ENCOUNTER — RESULT REVIEW (OUTPATIENT)
Age: 74
End: 2019-05-22

## 2019-05-22 ENCOUNTER — APPOINTMENT (OUTPATIENT)
Age: 74
End: 2019-05-22

## 2019-05-22 ENCOUNTER — APPOINTMENT (OUTPATIENT)
Dept: HEMATOLOGY ONCOLOGY | Facility: CLINIC | Age: 74
End: 2019-05-22
Payer: MEDICARE

## 2019-05-22 LAB
BASOPHILS # BLD AUTO: 0 K/UL — SIGNIFICANT CHANGE UP (ref 0–0.2)
BASOPHILS NFR BLD AUTO: 0.1 % — SIGNIFICANT CHANGE UP (ref 0–2)
BUN SERPL-MCNC: 28 MG/DL — HIGH (ref 7–23)
CA-I BLDA-SCNC: 1.16 MMOL/L — SIGNIFICANT CHANGE UP (ref 1.12–1.3)
CHLORIDE SERPL-SCNC: 100 MMOL/L — SIGNIFICANT CHANGE UP (ref 96–108)
CO2 SERPL-SCNC: 27 MMOL/L — SIGNIFICANT CHANGE UP (ref 22–31)
CREAT SERPL-MCNC: 1.1 MG/DL — SIGNIFICANT CHANGE UP (ref 0.5–1.3)
EOSINOPHIL # BLD AUTO: 0.1 K/UL — SIGNIFICANT CHANGE UP (ref 0–0.5)
EOSINOPHIL NFR BLD AUTO: 0.5 % — SIGNIFICANT CHANGE UP (ref 0–6)
GLUCOSE SERPL-MCNC: 256 MG/DL — HIGH (ref 70–99)
HCT VFR BLD CALC: 28.2 % — LOW (ref 34.5–45)
HGB BLD-MCNC: 10.2 G/DL — LOW (ref 11.5–15.5)
LYMPHOCYTES # BLD AUTO: 1.2 K/UL — SIGNIFICANT CHANGE UP (ref 1–3.3)
LYMPHOCYTES # BLD AUTO: 9.9 % — LOW (ref 13–44)
MCHC RBC-ENTMCNC: 34.1 PG — HIGH (ref 27–34)
MCHC RBC-ENTMCNC: 36.3 G/DL — HIGH (ref 32–36)
MCV RBC AUTO: 94 FL — SIGNIFICANT CHANGE UP (ref 80–100)
MONOCYTES # BLD AUTO: 0.6 K/UL — SIGNIFICANT CHANGE UP (ref 0–0.9)
MONOCYTES NFR BLD AUTO: 4.9 % — SIGNIFICANT CHANGE UP (ref 2–14)
NEUTROPHILS # BLD AUTO: 10 K/UL — HIGH (ref 1.8–7.4)
NEUTROPHILS NFR BLD AUTO: 84.6 % — HIGH (ref 43–77)
PLATELET # BLD AUTO: 176 K/UL — SIGNIFICANT CHANGE UP (ref 150–400)
POTASSIUM SERPL-MCNC: 3.7 MMOL/L — SIGNIFICANT CHANGE UP (ref 3.5–5.3)
POTASSIUM SERPL-SCNC: 3.7 MMOL/L — SIGNIFICANT CHANGE UP (ref 3.5–5.3)
RBC # BLD: 3 M/UL — LOW (ref 3.8–5.2)
RBC # FLD: 13.8 % — SIGNIFICANT CHANGE UP (ref 10.3–14.5)
SODIUM SERPL-SCNC: 138 MMOL/L — SIGNIFICANT CHANGE UP (ref 135–145)
WBC # BLD: 11.9 K/UL — HIGH (ref 3.8–10.5)
WBC # FLD AUTO: 11.9 K/UL — HIGH (ref 3.8–10.5)

## 2019-05-22 PROCEDURE — 99215 OFFICE O/P EST HI 40 MIN: CPT

## 2019-05-23 ENCOUNTER — APPOINTMENT (OUTPATIENT)
Dept: NEUROLOGY | Facility: CLINIC | Age: 74
End: 2019-05-23
Payer: MEDICARE

## 2019-05-23 PROCEDURE — 99203 OFFICE O/P NEW LOW 30 MIN: CPT

## 2019-05-23 NOTE — HISTORY OF PRESENT ILLNESS
[Disease: _____________________] : Disease: [unfilled] [T: ___] : T[unfilled] [N: ___] : N[unfilled] [AJCC Stage: ____] : AJCC Stage: [unfilled] [Treatment Protocol] : Treatment Protocol [de-identified] : 74yo woman presenting for medical oncology consultation.\par \par Last mammogram . She has no history of abnormal breast imaging, breast biopsies, or surgeries.\par \par She saw her PMD who sent her for a screening mammogram after she palpated a mass on the R side of her chest 2018. She had been having body aches/soreness and feeling general malaise since November, despite a course of antibiotics (just after flu shot administered). Also some R shoulder/arm pains.\par \par 18 Mammogram screenin.7cm irregular spiculated mass upper outer right breast 8cm FN with associated architectural distortion and several associated microcalcifications. Partially included enlarged right axillary lymph nodes. BIRADS 0\par \par 18 bilateral breast US: R breast 10:00 7cm FN 2.6cm irregular hypoechoic mass (biopsy recommended). Inferior right axillary lymph node with focal area of eccentric cortical thickening (recommend US guided biopsy). 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 Michael 7/9. 1.4cm involved, DCIS cribiform pattern with high grade nuclear atypia and apocrine cytology very focally present. (coil shaped clip) R axillary lymph node: metastatic ductal carcinoma with apocrine cytology involving a lymph node (hydromark marking clip) ER 0% MT 0% HER2 IHC 0 negative\par \par She saw Dr. Demetrio Robertson yesterday who recommended medical oncology consultation for neoadjuvant chemotherapy. \par \par She noted on intial visit with me some occasional body aches still and fatigue over the last month. She notes an intermittent tongue sensation - like a scald from food (though she does not recall an episode of this). Symptoms intermittent for months. Otherwise she feels well. She is active at baseline, uses stationary bike at home most days for exercise. \par \par 19 PET/CT: Superolateral R breast and R axillary lymph nodes FDG-avid. S/p left nephrectomy. Few subcm b/l pulmonary nodules are nonspecific. Please correlate clinically for infection and follow up with CT chest in 1 month.\par \par TTE 19: EF 64%, GLS -20 Wall motion abnormality in inferolateral wall reported, EKG stable. Patient seen by Dr. Lorrie Yadav (Cardiology) - TTE reviewed without significant wall motion abnormalities, antihypertensives changed \par \par MRI Brain w/wo contrast 19: L maxillary polyp v. retention cyst. Non enhancing area of abnormal T2 prolongation involving left thalamic region, nonspecific. Stable on repeat 2019.\par \par CT Chest 19: No interval changes since 19. Stable 3.3x2.1cm UOQ R breast, 2x1.2cm R axillary lymph node. Stable 4mm and smaller scattered indeterminant pulmonary nodules.\par \par She started neoadjuvant chemotherapy with AC on 19. Taxol #1 3/27.\par Taxol #2 with grade 3 infusion reaction, vasculitic rash development. Taxol reattempt with similar. Taxotere attempt  with chest discomfort, acute worsening of rash/itching, severe back pain radiating into the chest, a few minutes into infusion. Breast US performed with response to therapy noted.\par Also c/b hyperglycemia (A1C 8.0) - initiated home glucose monitoring and PMD follow up.\par \par CMF #1 with neulasta onpro started  without event.\par \par Pertinent History:\par No family history of breast or ovarian cancer.\par HTN - saw Cardiologist 2018, stress test normal per patient, Dr. Samaniego [facility], sees him annually. BP well controlled generally, now following with Dr. Yadav [facility]\par HLD \par Single kidney (kidney donor to brother in ) - baseline Cr 1.2-1.3 though patient notes no prior renal issues\par Transaminitis AST 43, ALT 54 (18), no prior history, resolved since beginning treatment\par BCC of skin removed\par Hysterectomy for fibroid uterus\par Cscope last ~5 years ago, no polyps per patient\par PMD Lance Lafleur\par Single, lives with her son Richard. She has a daughter as well who works at [employer]. She is a retired RN.  [de-identified] : ER-SC-HER2- [FreeTextEntry1] : Neoadjuvant Adriamycin 60mg/m2 + Cytoxan 600mg/m2 every 14 days x 4 cycles with Neulasta OnPro support (Day 1), to be followed by Taxol 80mg/m2 weekly for 12 doses [de-identified] : Patient presents today for follow up. She notes dry mouth with some mild burning sensation (similar to before), trying to hydrate more, fingerstick glucose has been unpredictable, all values <300 despite strict diet control. She will see Dr. Lafleur this week and tells me she plans to begin recommended DM medication. Sinus irritation persists. She has self palpated a small nontender nodule subcutaneously in the abdominal area. No other complaints.

## 2019-05-23 NOTE — ASSESSMENT
[Curative] : Goals of care discussed with patient: Curative [FreeTextEntry1] : 72yo woman with history of HTN on multiple medications, kidney donor/CKD, ER-AZ-HER2-, lymph-node positive right breast cancer anatomic stage IIB, AJCC 8th edition clinical prognostic stage IIIB, on neoadjuvant chemotherapy (dose-dense AC-->T) presenting for follow up. Course c/b neuropathy and a new rash after taxol, infusion reactions to both taxol and taxotere very quickly into infusion initiation. CT chest for cough 4/2019 with stable 3mm pulm nodules, decreased size of breast mass and axillary LN; US 4/17 with decrease in size of breast mass and axillary LN. Started CMF with OnPro q2 weeks on 4/24 without event, tolerating well.\par \par Breast Cancer: neuropathy resolved, tolerated CMF without infusion reactions\par -continue CMF C3, plan for 4 cycles as tolerated\par -check stat BMP today before treatment given CKD, reviewed today, OK\par -follow up patient's surgeon Dr. Robertson scheduled 6/4/2019\par -MRI Breasts deferred; she notes that she is planning b/l mastectomy surgery\par -Reglan PRN for nausea, EMLA cream use for Mediport treatments\par -patient's best contact home number: [phone], [phone]\par -patient was instructed to go to ER immediately for fever >100.4 or any concerning symptoms, signs of recurrent allergic reactions, call MD line 24/7 with questions or concerns\par \par Abdominal nodule: nonspecific, superficial. patient had similar nodule in groin recently that resolved (related to hair follicle v. LN v. prior skin rash)\par -US soft tissue abdomen to evaluate, reassess and consider biopsy if persists\par \par Hyperglycemia: in the setting of steroids, likely underlying insulin resistance/DMII undiagnosed. steroids now discontinued (though some given with each CMF dose)\par -hydration encouraged, continue to monitor\par -patient will continue to check FS glucose at home, call (downtrending <300)\par -follow up PMD this week to consider oral hypoglycemic agents\par \par MRI Brain findings: stable, nonspecific enhancement on repeat brain MRI\par -reviewed with Dr. Jacobs - continue treatment, follow up MRI Brain within a few months and establish care/follow up with Dr. Jacobs - patient has apt tomorrow, follow up\par \par Rash: unclear etiology, raised, erythematous, diffuse, not significantly improved with hydrocortisone topical\par -dermatology evaluation by Dr. 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 drug eruption now fully resolved\par -betamethasone ointment BID eprescribed per Dr. Mujica recommendations; follow up with him soon\par \par Anemia: Hgb 8, normocytic, suspect AC related, improved to >10 now\par -continue to monitor, asymptomatic\par \par HTN: \par -follow up Dr. Yadav with repeat TTE planned\par -continue new antihypertensives, coreg, and home BP monitoring per her recommendations (BP well controlled currently)\par

## 2019-05-23 NOTE — DATA REVIEWED
[de-identified] : I personally reviewed MR imaging dated\par 1/27/19	5/6/19		\par \par In reviewing these images, I find a curious hyperintensity involving the left posterior thalamus on the T2 weighted images, with signal change possibly representing a low grade neoplasm. \par I am concerned about two smaller sub cm T2 hyperintensities that involve the left parietal lobe, which may represent additional foci of neoplasm, but are more commonly simply UBOs.\par On the contrast enhanced images, there is no abnormal enhancement.\par Overall I find the images to be suspicious for low grade glioma, but not at all diagnostic. There is no change in the size/shape/volume of any of the lesions.\par Selected imaging was provided to the patient, along with a detailed verbal explanation of the issues at hand as outlined above.\par

## 2019-05-23 NOTE — PHYSICAL EXAM
[Fully active, able to carry on all pre-disease performance without restriction] : Status 0 - Fully active, able to carry on all pre-disease performance without restriction [Normal] : affect appropriate [de-identified] : alopecia - some hairs growing back [de-identified] : ~1cm round nodule superficial/subcutaneous mid abdomen, nontender

## 2019-05-23 NOTE — DISCUSSION/SUMMARY
[FreeTextEntry1] : ABNORMAL BRAIN MRI – while the imaging is absolutely suspicious for a thalamic glioma, it is not diagnostic. Moreover, I do not think it is life threatening, especially as there has been no interval change over 4 months. I recommend watchful waiting, repeating imaging at regular intervals. I note that thalamic gliomas in adults are more likely than lobar tumors to contain an H3F3 K27M mutation. However, as this is her dominant thalamus (which plays a role in memory and language), I do NOT recommend biopsy unless there is progression or symptomatology.\par TONGUE/MOUTH SENSATIONS – curious. It is almost like an impersistence of sensory stimuli, which can be seen in gliomas of the optic cortex. I wonder if the thalamus is playing a role here. I encouraged her to keep a diary. The sensations are worse with hyperglycemia, which argues against an epileptic etiology.\par DISPO – She will return in September with a new brain MRI with contrast and to see me.\par

## 2019-05-28 ENCOUNTER — FORM ENCOUNTER (OUTPATIENT)
Age: 74
End: 2019-05-28

## 2019-05-29 ENCOUNTER — NON-APPOINTMENT (OUTPATIENT)
Age: 74
End: 2019-05-29

## 2019-05-29 ENCOUNTER — APPOINTMENT (OUTPATIENT)
Dept: CARDIOLOGY | Facility: CLINIC | Age: 74
End: 2019-05-29
Payer: MEDICARE

## 2019-05-29 ENCOUNTER — OUTPATIENT (OUTPATIENT)
Dept: OUTPATIENT SERVICES | Facility: HOSPITAL | Age: 74
LOS: 1 days | End: 2019-05-29
Payer: MEDICARE

## 2019-05-29 ENCOUNTER — APPOINTMENT (OUTPATIENT)
Dept: ULTRASOUND IMAGING | Facility: IMAGING CENTER | Age: 74
End: 2019-05-29
Payer: MEDICARE

## 2019-05-29 VITALS
WEIGHT: 197 LBS | BODY MASS INDEX: 32.82 KG/M2 | HEIGHT: 65 IN | HEART RATE: 93 BPM | DIASTOLIC BLOOD PRESSURE: 69 MMHG | SYSTOLIC BLOOD PRESSURE: 128 MMHG | OXYGEN SATURATION: 97 %

## 2019-05-29 DIAGNOSIS — Z98.891 HISTORY OF UTERINE SCAR FROM PREVIOUS SURGERY: Chronic | ICD-10-CM

## 2019-05-29 DIAGNOSIS — Z90.5 ACQUIRED ABSENCE OF KIDNEY: Chronic | ICD-10-CM

## 2019-05-29 DIAGNOSIS — Z90.711 ACQUIRED ABSENCE OF UTERUS WITH REMAINING CERVICAL STUMP: Chronic | ICD-10-CM

## 2019-05-29 DIAGNOSIS — C50.919 MALIGNANT NEOPLASM OF UNSPECIFIED SITE OF UNSPECIFIED FEMALE BREAST: ICD-10-CM

## 2019-05-29 PROCEDURE — 76705 ECHO EXAM OF ABDOMEN: CPT

## 2019-05-29 PROCEDURE — 93000 ELECTROCARDIOGRAM COMPLETE: CPT

## 2019-05-29 PROCEDURE — 99214 OFFICE O/P EST MOD 30 MIN: CPT

## 2019-05-29 PROCEDURE — 76705 ECHO EXAM OF ABDOMEN: CPT | Mod: 26

## 2019-05-29 NOTE — HISTORY OF PRESENT ILLNESS
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 EF preserved\par STRESS: No ischemia on EST plain\par \par \par Presents for precancer treatment evaluation, CV risk stratification   and further evaluation of wall motion abnormality before chemotherapy with ACT\par \par She is here with her son today\par \par ROS: Denies Chest pain, dyspnea, palpitations, dizziness or syncope.\par \par Interval Note\par February 13, 2109\par \par Patient returns for a follow up cardiac evaluation. She underwent a repeat echocardiogram today which was stable and unchanged. \par \par Chemotherapy cycle #2/4 planned for today: Adriamycin 60 mg/ m2 and Cytoxan every 14 days.\par June 4 ,2019 meeting surgeon, Dr. Demetrio Robertson  for bilateral mastectomy.\par \par Blood pressure is normotensive. EKG is stable and unchanged. \par \par Interval f/u 3/27/19\par \par doing well\par taking medication \par \par ROS: Denies Chest pain, dyspnea, palpitations, dizziness or syncope.\par \par Interval Note\par May 29, 2019\par \par Patient returns prior to her planned bilateral mastectomy. \par \par Disease: Breast Cancer\par Pathology: ER- [place] -HER2-\par TNM stge: T2 N1\par AJCC stage: Anatomic llB, clinical Prognostic lllB \par \par Chemo review:\par Started on Neoadjuvant chemotherapy with AC on January 28, 2019 and Taxol #1 on  March 27 th.\par During her second Taxol infusion , she developed an infusion reaction and a vasculitic rash. Taxol was reattempted with Taxotere on April 17 th with chest discomfort, acute worsening rash , severe back and chest pain. \par Also developed hyperglycemia (A1C 8.0) and home glucose monitoring and PMD following\par \par *Started with CMP #1 with Neulasta on pro  on April 24, 2019 without any events. \par \par Final chemotherapy regimen to be completed on June 5, 2019 with bilateral mastectomy to be scheduled post treatment. \par \par Patient reports that most recently she has been suffering with a dry mouth and some mild burning sensation, trying to hydrate and fingerstick glucose has been unpredictable. All values <300 despite strict diet control. \par \par Otherwise offers no other complaints of shortness of breath, chest discomfort or palpiations.\par \par \par \par \par \par \par

## 2019-05-29 NOTE — DISCUSSION/SUMMARY
[FreeTextEntry1] : 73 year old female with history of right sided breast cancer. Completing CMP chemotherapy next week.\par Awaiting bilateral mastectomy evaluation.\par \par Assessment/ Plan\par -Blood pressure under good control\par -Continue with Valsartan-HCTZ and Coreg\par -Continue with statin to treat hyperlipidemia\par -Blood sugars to be followed by PCP\par \par Follow up in three months

## 2019-05-29 NOTE — PHYSICAL EXAM
[General Appearance - Well Developed] : well developed [Normal Appearance] : normal appearance [General Appearance - Well Nourished] : well nourished [Well Groomed] : well groomed [No Deformities] : no deformities [General Appearance - In No Acute Distress] : no acute distress [Normal Conjunctiva] : the conjunctiva exhibited no abnormalities [Eyelids - No Xanthelasma] : the eyelids demonstrated no xanthelasmas [No Oral Cyanosis] : no oral cyanosis [No Oral Pallor] : no oral pallor [Normal Oral Mucosa] : normal oral mucosa [Normal Jugular Venous A Waves Present] : normal jugular venous A waves present [Normal Jugular Venous V Waves Present] : normal jugular venous V waves present [No Jugular Venous Lopez A Waves] : no jugular venous lopez A waves [Respiration, Rhythm And Depth] : normal respiratory rhythm and effort [Exaggerated Use Of Accessory Muscles For Inspiration] : no accessory muscle use [Auscultation Breath Sounds / Voice Sounds] : lungs were clear to auscultation bilaterally [Abdomen Soft] : soft [Abdomen Tenderness] : non-tender [Abdomen Mass (___ Cm)] : no abdominal mass palpated [Abnormal Walk] : normal gait [Gait - Sufficient For Exercise Testing] : the gait was sufficient for exercise testing [Nail Clubbing] : no clubbing of the fingernails [] : no ischemic changes [Cyanosis, Localized] : no localized cyanosis [Skin Color & Pigmentation] : normal skin color and pigmentation [Petechial Hemorrhages (___cm)] : no petechial hemorrhages [Oriented To Time, Place, And Person] : oriented to person, place, and time

## 2019-05-31 ENCOUNTER — APPOINTMENT (OUTPATIENT)
Dept: INTERNAL MEDICINE | Facility: CLINIC | Age: 74
End: 2019-05-31
Payer: MEDICARE

## 2019-05-31 VITALS
BODY MASS INDEX: 32.82 KG/M2 | HEART RATE: 100 BPM | SYSTOLIC BLOOD PRESSURE: 110 MMHG | DIASTOLIC BLOOD PRESSURE: 60 MMHG | WEIGHT: 197 LBS | HEIGHT: 65 IN

## 2019-05-31 VITALS — HEART RATE: 88 BPM | RESPIRATION RATE: 16 BRPM

## 2019-05-31 LAB — GLUCOSE BLDC GLUCOMTR-MCNC: 346

## 2019-05-31 PROCEDURE — 82962 GLUCOSE BLOOD TEST: CPT

## 2019-05-31 PROCEDURE — 99214 OFFICE O/P EST MOD 30 MIN: CPT

## 2019-05-31 NOTE — HISTORY OF PRESENT ILLNESS
[FreeTextEntry1] : pain, htn, breast cancer, hyperglycemia [de-identified] : 74 y/o female with a hx of HTN , kidney donor, breast mass and lad on mammo/sono \par here for f/u \par Had reactions to chemotx with oncology.  Had received doses of steroids.  \par Has been getting steroids since she has been getting the chemo.  Now getting only with chemo.  \par Has continued having high blood sugars -has been higher.  Had recently stopped taking.   \par Has been following with cardiology.  s/p recent f/u echo\par BP has been controlled.  \par appetite has been altered by the chemo.  Has been trying to follow lower carb - occ will have.\par Has continued walking for exercise.  Bike has been bothering her port.\par Aches have been improved - with pains after chemo.  \par With some side effects from the chemo.  sores at the lip.  Had rash and reaction from the chemo as noted.  \par \par mammo/sono 12/18 - mass and axillary node - for bx.\par pap overdue.\par \par Had flu vaccine\par had prevnar

## 2019-05-31 NOTE — REVIEW OF SYSTEMS
[Diarrhea] : diarrhea [Joint Pain] : joint pain [Muscle Pain] : muscle pain [Skin Rash] : skin rash [Negative] : Heme/Lymph [Fever] : no fever [Chills] : no chills [Fatigue] : no fatigue [Hot Flashes] : no hot flashes [Night Sweats] : no night sweats [Recent Change In Weight] : ~T no recent weight change [Earache] : no earache [Hearing Loss] : no hearing loss [Nosebleed] : no nosebleeds [Hoarseness] : no hoarseness [Nasal Discharge] : no nasal discharge [Sore Throat] : no sore throat [Postnasal Drip] : no postnasal drip [FreeTextEntry4] : mouth soreness [FreeTextEntry7] : altered taste, occ diarrhea from the chemo [FreeTextEntry9] : aches improved as noted.

## 2019-05-31 NOTE — COUNSELING
[Weight management counseling provided] : Weight management [Healthy eating counseling provided] : healthy eating [Activity counseling provided] : activity [Good understanding] : Patient has a good understanding of disease, goals and obesity follow-up plan [Low Fat Diet] : Low fat diet [Low Salt Diet] : Low salt diet [Decrease Portions] : Decrease food portions [Walking] : Walking

## 2019-06-04 ENCOUNTER — APPOINTMENT (OUTPATIENT)
Dept: SURGICAL ONCOLOGY | Facility: CLINIC | Age: 74
End: 2019-06-04
Payer: MEDICARE

## 2019-06-04 VITALS
DIASTOLIC BLOOD PRESSURE: 76 MMHG | RESPIRATION RATE: 18 BRPM | HEART RATE: 96 BPM | OXYGEN SATURATION: 95 % | BODY MASS INDEX: 32.82 KG/M2 | HEIGHT: 65 IN | WEIGHT: 197 LBS | TEMPERATURE: 98 F | SYSTOLIC BLOOD PRESSURE: 130 MMHG

## 2019-06-04 PROCEDURE — 99215 OFFICE O/P EST HI 40 MIN: CPT

## 2019-06-04 NOTE — HISTORY OF PRESENT ILLNESS
[de-identified] : 73 Y F presents to my office for a follow up breast exam.  She initially presented on 1/15/19 with a complaint of palpable breast mass R breast since Dec 2018, underwent mammogram followed by CNB of R breast mass and R axillary lymph node suggestive of invasive ductal carcinoma with metastatic lymphadenopathy.\par No prior h/o breast biopsy or ca .\par No family h/o breast ca\par I placed her mediport on 1/25/19 and she has been undergoing neoadjuvant chemotherapy under the guidance of Carla Rodgers. \par She underwent a repeat bilateral breast sonogram on 4/18/19 which showed a smaller right breast mass and right axillary axillary lymph node.  A stable mass was noted in the right breast possibly a satellite lesion (Birads 6).

## 2019-06-04 NOTE — CONSULT LETTER
[Consult Letter:] : I had the pleasure of evaluating your patient, [unfilled]. [Dear  ___] : Dear  [unfilled], [Consult Closing:] : Thank you very much for allowing me to participate in the care of this patient.  If you have any questions, please do not hesitate to contact me. [( Thank you for referring [unfilled] for consultation for _____ )] : Thank you for referring [unfilled] for consultation for [unfilled] [Please see my note below.] : Please see my note below. [DrMaci  ___] : Dr. TABARES [Sincerely,] : Sincerely, [FreeTextEntry3] : Demetrio Robertson MD, FICS, FACS\par , Surgical Oncology\par [facility]\par [address]\[place], [place]- [address]\par \par [address]\[place], [place]- [address]\par \par (mob) [phone]\par (o) [phone]\par (f) [phone]\par

## 2019-06-04 NOTE — ASSESSMENT
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  Currently undergoing neoadjuvant chemotherapy with a notable decrease in size of mass and lymph node on recent breast sonogram from 4/18/19.\par \par Plan:\par Pt currently receiving neoadjuvant chemotherapy- last dose tomorrow.\par Pt requesting for bilateral mastectomy, will plan for right total mastectomy, right axillary SLNB and saviscout LN loc biopsy, possible dissection, left risk reducing total mastectomy with axillary SLNB. Will plan for July 2019\par Pt refusing for any plastic reconstruction.\par I have discussed the diagnosis, therapeutic plan and options with the patient at length. Patient expressed verbal understanding to proceed with proposed plan. All questions answered.\par I have discussed the risks, benefits, alternatives, complications including but not limited bleeding, infection, damage to adjacent structures, recurrence to the patient in detail. Patient expressed verbal understanding. Written informed consent to be obtained in the preoperative period.\par

## 2019-06-04 NOTE — PHYSICAL EXAM
[Normal] : supple, no neck mass and thyroid not enlarged [Normal Neck Lymph Nodes] : normal neck lymph nodes  [Normal Axillary Lymph Nodes] : normal axillary lymph nodes [Normal Supraclavicular Lymph Nodes] : normal supraclavicular lymph nodes [Normal Groin Lymph Nodes] : normal groin lymph nodes [Normal] : oriented to person, place and time, with appropriate affect [de-identified] : previously palpable right breast mass- smaller in size, no palpable axillary and or cervical LNpathy

## 2019-06-05 ENCOUNTER — APPOINTMENT (OUTPATIENT)
Age: 74
End: 2019-06-05

## 2019-06-05 ENCOUNTER — RESULT REVIEW (OUTPATIENT)
Age: 74
End: 2019-06-05

## 2019-06-05 ENCOUNTER — APPOINTMENT (OUTPATIENT)
Dept: HEMATOLOGY ONCOLOGY | Facility: CLINIC | Age: 74
End: 2019-06-05
Payer: MEDICARE

## 2019-06-05 ENCOUNTER — LABORATORY RESULT (OUTPATIENT)
Age: 74
End: 2019-06-05

## 2019-06-05 VITALS
HEART RATE: 88 BPM | DIASTOLIC BLOOD PRESSURE: 76 MMHG | OXYGEN SATURATION: 96 % | BODY MASS INDEX: 32.47 KG/M2 | WEIGHT: 195.11 LBS | SYSTOLIC BLOOD PRESSURE: 124 MMHG | RESPIRATION RATE: 18 BRPM | TEMPERATURE: 98 F

## 2019-06-05 LAB
BASOPHILS # BLD AUTO: 0 K/UL — SIGNIFICANT CHANGE UP (ref 0–0.2)
BASOPHILS NFR BLD AUTO: 0.1 % — SIGNIFICANT CHANGE UP (ref 0–2)
BUN SERPL-MCNC: 24 MG/DL — HIGH (ref 7–23)
CA-I BLDA-SCNC: 1.12 MMOL/L — SIGNIFICANT CHANGE UP (ref 1.12–1.3)
CHLORIDE SERPL-SCNC: 103 MMOL/L — SIGNIFICANT CHANGE UP (ref 96–108)
CO2 SERPL-SCNC: 22 MMOL/L — SIGNIFICANT CHANGE UP (ref 22–31)
CREAT SERPL-MCNC: 1.2 MG/DL — SIGNIFICANT CHANGE UP (ref 0.5–1.3)
EOSINOPHIL # BLD AUTO: 0.1 K/UL — SIGNIFICANT CHANGE UP (ref 0–0.5)
EOSINOPHIL NFR BLD AUTO: 0.4 % — SIGNIFICANT CHANGE UP (ref 0–6)
GLUCOSE SERPL-MCNC: 239 MG/DL — HIGH (ref 70–99)
HCT VFR BLD CALC: 27.4 % — LOW (ref 34.5–45)
HGB BLD-MCNC: 9.9 G/DL — LOW (ref 11.5–15.5)
LYMPHOCYTES # BLD AUTO: 1.2 K/UL — SIGNIFICANT CHANGE UP (ref 1–3.3)
LYMPHOCYTES # BLD AUTO: 8.6 % — LOW (ref 13–44)
MCHC RBC-ENTMCNC: 33.9 PG — SIGNIFICANT CHANGE UP (ref 27–34)
MCHC RBC-ENTMCNC: 36.1 G/DL — HIGH (ref 32–36)
MCV RBC AUTO: 93.9 FL — SIGNIFICANT CHANGE UP (ref 80–100)
MONOCYTES # BLD AUTO: 0.6 K/UL — SIGNIFICANT CHANGE UP (ref 0–0.9)
MONOCYTES NFR BLD AUTO: 4.6 % — SIGNIFICANT CHANGE UP (ref 2–14)
NEUTROPHILS # BLD AUTO: 12.2 K/UL — HIGH (ref 1.8–7.4)
NEUTROPHILS NFR BLD AUTO: 86.3 % — HIGH (ref 43–77)
PLATELET # BLD AUTO: 104 K/UL — SIGNIFICANT CHANGE UP (ref 150–400)
POTASSIUM SERPL-MCNC: 3.6 MMOL/L — SIGNIFICANT CHANGE UP (ref 3.5–5.3)
POTASSIUM SERPL-SCNC: 3.6 MMOL/L — SIGNIFICANT CHANGE UP (ref 3.5–5.3)
RBC # BLD: 2.92 M/UL — LOW (ref 3.8–5.2)
RBC # FLD: 14 % — SIGNIFICANT CHANGE UP (ref 10.3–14.5)
SODIUM SERPL-SCNC: 139 MMOL/L — SIGNIFICANT CHANGE UP (ref 135–145)
WBC # BLD: 14.1 K/UL — HIGH (ref 3.8–10.5)
WBC # FLD AUTO: 14.1 K/UL — HIGH (ref 3.8–10.5)

## 2019-06-05 PROCEDURE — 99215 OFFICE O/P EST HI 40 MIN: CPT

## 2019-06-06 LAB
ALBUMIN SERPL ELPH-MCNC: 4.1 G/DL
ALP BLD-CCNC: 97 U/L
ALT SERPL-CCNC: 47 U/L
ANION GAP SERPL CALC-SCNC: 14 MMOL/L
AST SERPL-CCNC: 32 U/L
BILIRUB SERPL-MCNC: 0.3 MG/DL
BUN SERPL-MCNC: 24 MG/DL
CALCIUM SERPL-MCNC: 9.1 MG/DL
CHLORIDE SERPL-SCNC: 104 MMOL/L
CO2 SERPL-SCNC: 22 MMOL/L
CREAT SERPL-MCNC: 1.17 MG/DL
GLUCOSE SERPL-MCNC: 238 MG/DL
POTASSIUM SERPL-SCNC: 3.8 MMOL/L
PROT SERPL-MCNC: 5.7 G/DL
SODIUM SERPL-SCNC: 140 MMOL/L

## 2019-06-07 RX ORDER — HYDROCORTISONE 25 MG/G
2.5 OINTMENT TOPICAL
Qty: 20 | Refills: 0 | Status: DISCONTINUED | COMMUNITY
Start: 2019-04-04

## 2019-06-07 NOTE — ASSESSMENT
[FreeTextEntry1] : 72yo woman with history of HTN on multiple medications, kidney donor/CKD, ER-NE-HER2-, lymph-node positive right breast cancer anatomic stage IIB, AJCC 8th edition clinical prognostic stage IIIB, on neoadjuvant chemotherapy (dose-dense AC-->T) presenting for follow up. Course c/b neuropathy and a new rash after taxol, infusion reactions to both taxol and taxotere very quickly into infusion initiation. CT chest for cough 4/2019 with stable 3mm pulm nodules, decreased size of breast mass and axillary LN; US 4/17 with decrease in size of breast mass and axillary LN. Started CMF with OnPro q2 weeks on 4/24 without event, tolerating well.\par \par Breast Cancer: neuropathy resolved, tolerated CMF without infusion reactions\par -continue CMF C4 last cycle today, close monitoring of CBC/platelets (borderline, OK today)\par -check stat BMP today before treatment given CKD, reviewed today, OK\par -follow up patient's surgeon Dr. Roberston; surgery to be scheduled for late June/early July b/l mastectomy and SLNB/D on R\par -Reglan PRN for nausea, EMLA cream use for Mediport treatments\par -patient's best contact home number: [phone], [phone]\par -patient was instructed to go to ER immediately for fever >100.4 or any concerning symptoms, signs of recurrent allergic reactions, call MD line 24/7 with questions or concerns\par \par Abdominal nodule: nonspecific, superficial. patient had similar nodule in groin recently that resolved (related to hair follicle v. LN v. prior skin rash)\par -US soft tissue c/w small lipoma\par -continue to monitor, advised patient if any change in size/shape to report/evaluate ASAP\par \par Hyperglycemia: in the setting of steroids, likely underlying insulin resistance/DMII undiagnosed. steroids now discontinued (though some given with each CMF dose)\par -continue metformin\par -hydration encouraged, continue to monitor\par -patient will continue to check FS glucose at home, call (downtrending <300)\par -follow up PMD\par \par MRI Brain findings: stable, nonspecific thalamic enhancement on repeat brain MRI\par -reviewed with Dr. Jacobs - continue treatment, follow up MRI Brain Sept and follow up with him at that time\par \par Rash: unclear etiology, raised, erythematous, diffuse, not significantly improved with hydrocortisone topical\par -dermatology evaluation by Dr. Logan in office last week - LEs consistent with small vessel vasculitis v. drug eruption now fully resolved\par -continue to monitor, follow up Dr. Logan\par \par Anemia: Hgb 8, normocytic, suspect AC related, improved to >10 now\par -continue to monitor, asymptomatic\par \par HTN: \par -follow up Dr. Yadav with repeat TTE planned, pre-operative clearance\par -continue new antihypertensives, coreg, and home BP monitoring per her recommendations (BP well controlled currently)\par  [Curative] : Goals of care discussed with patient: Curative

## 2019-06-07 NOTE — PHYSICAL EXAM
[Fully active, able to carry on all pre-disease performance without restriction] : Status 0 - Fully active, able to carry on all pre-disease performance without restriction [de-identified] : alopecia - some hairs growing back [Normal] : affect appropriate [de-identified] : ~1cm round nodule superficial/subcutaneous mid abdomen, nontender, unchanged [de-identified] : no masses or adenopathy palpable b/l

## 2019-06-07 NOTE — HISTORY OF PRESENT ILLNESS
[T: ___] : T[unfilled] [Disease: _____________________] : Disease: [unfilled] [AJCC Stage: ____] : AJCC Stage: [unfilled] [N: ___] : N[unfilled] [de-identified] : 72yo woman presenting for medical oncology follow up.\par \par Last mammogram . She has no history of abnormal breast imaging, breast biopsies, or surgeries.\par \par She saw her PMD who sent her for a screening mammogram after she palpated a mass on the R side of her chest 2018. She had been having body aches/soreness and feeling general malaise since November, despite a course of antibiotics (just after flu shot administered). Also some R shoulder/arm pains.\par \par 18 Mammogram screenin.7cm irregular spiculated mass upper outer right breast 8cm FN with associated architectural distortion and several associated microcalcifications. Partially included enlarged right axillary lymph nodes. BIRADS 0\par \par 18 bilateral breast US: R breast 10:00 7cm FN 2.6cm irregular hypoechoic mass (biopsy recommended). Inferior right axillary lymph node with focal area of eccentric cortical thickening (recommend US guided biopsy). 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 Michael 7/9. 1.4cm involved, DCIS cribiform pattern with high grade nuclear atypia and apocrine cytology very focally present. (coil shaped clip) R axillary lymph node: metastatic ductal carcinoma with apocrine cytology involving a lymph node (hydromark marking clip) ER 0% IL 0% HER2 IHC 0 negative\par \par She saw Dr. Demetrio Robertson yesterday who recommended medical oncology consultation for neoadjuvant chemotherapy. \par \par She noted on intial visit with me some occasional body aches still and fatigue over the last month. She notes an intermittent tongue sensation - like a scald from food (though she does not recall an episode of this). Symptoms intermittent for months. Otherwise she feels well. She is active at baseline, uses stationary bike at home most days for exercise. \par \par 19 PET/CT: Superolateral R breast and R axillary lymph nodes FDG-avid. S/p left nephrectomy. Few subcm b/l pulmonary nodules are nonspecific. Please correlate clinically for infection and follow up with CT chest in 1 month.\par \par TTE 19: EF 64%, GLS -20 Wall motion abnormality in inferolateral wall reported, EKG stable. Patient seen by Dr. Lorrie Yadav (Cardiology) - TTE reviewed without significant wall motion abnormalities, antihypertensives changed \par \par MRI Brain w/wo contrast 19: L maxillary polyp v. retention cyst. Non enhancing area of abnormal T2 prolongation involving left thalamic region, nonspecific. Stable on repeat 2019.\par \par CT Chest 19: No interval changes since 19. Stable 3.3x2.1cm UOQ R breast, 2x1.2cm R axillary lymph node. Stable 4mm and smaller scattered indeterminant pulmonary nodules.\par \par She started neoadjuvant chemotherapy with AC on 19. Taxol #1 3/27.\par Taxol #2 with grade 3 infusion reaction, vasculitic rash development. Taxol reattempt with similar. Taxotere attempt  with chest discomfort, acute worsening of rash/itching, severe back pain radiating into the chest, a few minutes into infusion. Breast US performed with response to therapy noted.\par Also c/b hyperglycemia (A1C 8.0) - initiated home glucose monitoring and PMD follow up.\par \par CMF #1 with neulasta onpro started  without event.\par \par Pertinent History:\par No family history of breast or ovarian cancer.\par HTN - saw Cardiologist 2018, stress test normal per patient, Dr. Samaniego [facility], sees him annually. BP well controlled generally, now following with Dr. Yadav Oncocardiology\par HLD \par Single kidney (kidney donor to brother in ) - baseline Cr 1.2-1.3 though patient notes no prior renal issues\par Transaminitis AST 43, ALT 54 (18), no prior history, resolved since beginning treatment\par BCC of skin removed\par Hysterectomy for fibroid uterus\par Cscope last ~5 years ago, no polyps per patient\par PMD Lance Lafleur\par Single, lives with her son Richard. She has a daughter as well who works at [employer]. She is a retired RN.  [de-identified] : ER-LA-HER2- [Treatment Protocol] : Treatment Protocol [FreeTextEntry1] : Neoadjuvant Adriamycin 60mg/m2 + Cytoxan 600mg/m2 every 14 days x 4 cycles with Neulasta OnPro support (Day 1), taxol 80mg/m2 x 1, followed by CMF x 4 [de-identified] : Patient presents today for follow up. She saw Dr. Lafleur this week and started metformin without issues or symptoms, checking fingerstick glucose occasionally. She notes her skin is fully back to baseline. She has seen Dr. Branham with plan for MRI Brain in September. She feels well. No other complaints.

## 2019-06-18 ENCOUNTER — OUTPATIENT (OUTPATIENT)
Dept: OUTPATIENT SERVICES | Facility: HOSPITAL | Age: 74
LOS: 1 days | Discharge: HOME | End: 2019-06-18

## 2019-06-18 DIAGNOSIS — Z90.711 ACQUIRED ABSENCE OF UTERUS WITH REMAINING CERVICAL STUMP: Chronic | ICD-10-CM

## 2019-06-18 DIAGNOSIS — Z98.891 HISTORY OF UTERINE SCAR FROM PREVIOUS SURGERY: Chronic | ICD-10-CM

## 2019-06-18 DIAGNOSIS — C50.919 MALIGNANT NEOPLASM OF UNSPECIFIED SITE OF UNSPECIFIED FEMALE BREAST: ICD-10-CM

## 2019-06-18 DIAGNOSIS — Z90.5 ACQUIRED ABSENCE OF KIDNEY: Chronic | ICD-10-CM

## 2019-06-20 ENCOUNTER — RX RENEWAL (OUTPATIENT)
Age: 74
End: 2019-06-20

## 2019-06-21 ENCOUNTER — RESULT REVIEW (OUTPATIENT)
Age: 74
End: 2019-06-21

## 2019-06-21 ENCOUNTER — APPOINTMENT (OUTPATIENT)
Dept: HEMATOLOGY ONCOLOGY | Facility: CLINIC | Age: 74
End: 2019-06-21
Payer: MEDICARE

## 2019-06-21 VITALS
WEIGHT: 197 LBS | DIASTOLIC BLOOD PRESSURE: 76 MMHG | HEART RATE: 92 BPM | TEMPERATURE: 98.1 F | SYSTOLIC BLOOD PRESSURE: 116 MMHG | BODY MASS INDEX: 32.78 KG/M2 | OXYGEN SATURATION: 95 % | RESPIRATION RATE: 18 BRPM

## 2019-06-21 LAB
BASOPHILS # BLD AUTO: 0 K/UL — SIGNIFICANT CHANGE UP (ref 0–0.2)
BASOPHILS NFR BLD AUTO: 0.1 % — SIGNIFICANT CHANGE UP (ref 0–2)
EOSINOPHIL # BLD AUTO: 0.1 K/UL — SIGNIFICANT CHANGE UP (ref 0–0.5)
EOSINOPHIL NFR BLD AUTO: 0.7 % — SIGNIFICANT CHANGE UP (ref 0–6)
HCT VFR BLD CALC: 30.8 % — LOW (ref 34.5–45)
HGB BLD-MCNC: 10.9 G/DL — LOW (ref 11.5–15.5)
LYMPHOCYTES # BLD AUTO: 1 K/UL — SIGNIFICANT CHANGE UP (ref 1–3.3)
LYMPHOCYTES # BLD AUTO: 7.3 % — LOW (ref 13–44)
MCHC RBC-ENTMCNC: 32.9 PG — SIGNIFICANT CHANGE UP (ref 27–34)
MCHC RBC-ENTMCNC: 35.3 G/DL — SIGNIFICANT CHANGE UP (ref 32–36)
MCV RBC AUTO: 93.3 FL — SIGNIFICANT CHANGE UP (ref 80–100)
MONOCYTES # BLD AUTO: 0.6 K/UL — SIGNIFICANT CHANGE UP (ref 0–0.9)
MONOCYTES NFR BLD AUTO: 4.2 % — SIGNIFICANT CHANGE UP (ref 2–14)
NEUTROPHILS # BLD AUTO: 12.1 K/UL — HIGH (ref 1.8–7.4)
NEUTROPHILS NFR BLD AUTO: 87.7 % — HIGH (ref 43–77)
PLATELET # BLD AUTO: 184 K/UL — SIGNIFICANT CHANGE UP (ref 150–400)
RBC # BLD: 3.3 M/UL — LOW (ref 3.8–5.2)
RBC # FLD: 14.6 % — HIGH (ref 10.3–14.5)
WBC # BLD: 13.8 K/UL — HIGH (ref 3.8–10.5)
WBC # FLD AUTO: 13.8 K/UL — HIGH (ref 3.8–10.5)

## 2019-06-21 PROCEDURE — 99214 OFFICE O/P EST MOD 30 MIN: CPT

## 2019-06-21 RX ORDER — POTASSIUM CHLORIDE 1500 MG/1
20 TABLET, EXTENDED RELEASE ORAL DAILY
Qty: 30 | Refills: 0 | Status: DISCONTINUED | COMMUNITY
Start: 2019-05-09 | End: 2019-06-21

## 2019-06-21 NOTE — ASSESSMENT
[Curative] : Goals of care discussed with patient: Curative [FreeTextEntry1] : 72yo woman with history of HTN on multiple medications, kidney donor/CKD, ER-DE-HER2-, lymph-node positive right breast cancer anatomic stage IIB, AJCC 8th edition clinical prognostic stage IIIB, on neoadjuvant chemotherapy (dose-dense AC-->T) presenting for follow up. Course c/b neuropathy and a new rash after taxol, infusion reactions to both taxol and taxotere very quickly into infusion initiation. CT chest for cough 4/2019 with stable 3mm pulm nodules, decreased size of breast mass and axillary LN; US 4/17 with decrease in size of breast mass and axillary LN. She completed 4 cycles of CMF with OnPro q2 weeks.\par \par Breast Cancer: neuropathy resolved, tolerated CMF without infusion reactions\par -check CBC/CMP today\par -follow up patient's surgeon Dr. Robertson; preop testing this week and surgery 7/11\par -Reglan PRN for nausea, EMLA cream use for Mediport treatments\par -patient's best contact home number: [phone], [phone]\par -patient was instructed to go to ER immediately for fever >100.4 or any concerning symptoms, signs of recurrent allergic reactions, call MD line 24/7 with questions or concerns\par -follow up with me 2-3 weeks after surgery, sooner if issues\par \par Abdominal nodule: nonspecific, superficial. patient had similar nodule in groin recently that resolved (related to hair follicle v. LN v. prior skin rash)\par -US soft tissue c/w small lipoma\par -continue to monitor, advised patient if any change in size/shape to report/evaluate ASAP\par \par Hyperglycemia: in the setting of steroids, likely underlying insulin resistance/DMII undiagnosed. steroids now discontinued (though some given with each CMF dose), FS 100s consistently now\par -continue metformin\par -hydration encouraged, continue to monitor\par -patient will continue to check FS glucose at home, call (downtrending <300)\par -follow up PMD\par \par MRI Brain findings: stable, nonspecific thalamic enhancement on repeat brain MRI\par -reviewed with Dr. Jacobs - continue treatment, follow up MRI Brain Sept and follow up with him at that time\par \par Rash: unclear etiology, raised, erythematous, diffuse, not significantly improved with hydrocortisone topical - resolved but now with R axilla and groin rash\par -dermatology evaluation by Dr. Logan this week\par -continue to monitor\par \par Anemia: Hgb 8, normocytic, suspect AC related, improved to >10 now\par -continue to monitor, asymptomatic\par \par HTN: \par -follow up Dr. Yadav with repeat TTE planned, pre-operative clearance\par -continue new antihypertensives, coreg, and home BP monitoring per her recommendations (BP well controlled currently)\par

## 2019-06-21 NOTE — PHYSICAL EXAM
[Fully active, able to carry on all pre-disease performance without restriction] : Status 0 - Fully active, able to carry on all pre-disease performance without restriction [Normal] : grossly intact [de-identified] : hairs growing back [de-identified] : no masses or adenopathy palpable b/l [de-identified] : ~1cm round nodule superficial/subcutaneous mid abdomen, nontender, unchanged [de-identified] : very faint erythema R groin/vaginal fold, R axilla confluent erythema with two small papules noted (improving per patient)

## 2019-06-21 NOTE — HISTORY OF PRESENT ILLNESS
[Disease: _____________________] : Disease: [unfilled] [T: ___] : T[unfilled] [N: ___] : N[unfilled] [AJCC Stage: ____] : AJCC Stage: [unfilled] [Treatment Protocol] : Treatment Protocol [de-identified] : ER-PA-HER2- [de-identified] : 72yo woman presenting for medical oncology follow up.\par \par Last mammogram . She has no history of abnormal breast imaging, breast biopsies, or surgeries.\par \par She saw her PMD who sent her for a screening mammogram after she palpated a mass on the R side of her chest 2018. She had been having body aches/soreness and feeling general malaise since November, despite a course of antibiotics (just after flu shot administered). Also some R shoulder/arm pains.\par \par 18 Mammogram screenin.7cm irregular spiculated mass upper outer right breast 8cm FN with associated architectural distortion and several associated microcalcifications. Partially included enlarged right axillary lymph nodes. BIRADS 0\par \par 18 bilateral breast US: R breast 10:00 7cm FN 2.6cm irregular hypoechoic mass (biopsy recommended). Inferior right axillary lymph node with focal area of eccentric cortical thickening (recommend US guided biopsy). 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 Michael 7/9. 1.4cm involved, DCIS cribiform pattern with high grade nuclear atypia and apocrine cytology very focally present. (coil shaped clip) R axillary lymph node: metastatic ductal carcinoma with apocrine cytology involving a lymph node (hydromark marking clip) ER 0% OR 0% HER2 IHC 0 negative\par \par She saw Dr. Demetrio Robertson yesterday who recommended medical oncology consultation for neoadjuvant chemotherapy. \par \par She noted on intial visit with me some occasional body aches still and fatigue over the last month. She notes an intermittent tongue sensation - like a scald from food (though she does not recall an episode of this). Symptoms intermittent for months. Otherwise she feels well. She is active at baseline, uses stationary bike at home most days for exercise. \par \par 19 PET/CT: Superolateral R breast and R axillary lymph nodes FDG-avid. S/p left nephrectomy. Few subcm b/l pulmonary nodules are nonspecific. Please correlate clinically for infection and follow up with CT chest in 1 month.\par \par TTE 19: EF 64%, GLS -20 Wall motion abnormality in inferolateral wall reported, EKG stable. Patient seen by Dr. Lorrie Yadav (Cardiology) - TTE reviewed without significant wall motion abnormalities, antihypertensives changed \par \par MRI Brain w/wo contrast 19: L maxillary polyp v. retention cyst. Non enhancing area of abnormal T2 prolongation involving left thalamic region, nonspecific. Stable on repeat 2019.\par \par CT Chest 19: No interval changes since 19. Stable 3.3x2.1cm UOQ R breast, 2x1.2cm R axillary lymph node. Stable 4mm and smaller scattered indeterminant pulmonary nodules.\par \par She started neoadjuvant chemotherapy with AC on 19. Taxol #1 3/27.\par Taxol #2 with grade 3 infusion reaction, vasculitic rash development. Taxol reattempt with similar. Taxotere attempt  with chest discomfort, acute worsening of rash/itching, severe back pain radiating into the chest, a few minutes into infusion. Breast US performed with response to therapy noted.\par Also c/b hyperglycemia (A1C 8.0) - initiated home glucose monitoring and PMD follow up.\par \par CMF #1 with neulasta onpro started  without event; 4 cycles completed.\par \par Pertinent History:\par No family history of breast or ovarian cancer.\par HTN - saw Cardiologist 2018, stress test normal per patient, Dr. Samaniego [facility], sees him annually. BP well controlled generally, now following with Dr. Yadav Oncocardiology\par HLD \par Single kidney (kidney donor to brother in ) - baseline Cr 1.2-1.3 though patient notes no prior renal issues\par Transaminitis AST 43, ALT 54 (18), no prior history, resolved since beginning treatment\par BCC of skin removed\par Hysterectomy for fibroid uterus\par Cscope last ~5 years ago, no polyps per patient\par PMD Lance Lafleur\par Single, lives with her son Richard. She has a daughter as well who works at [employer]. She is a retired RN.  [FreeTextEntry1] : Neoadjuvant Adriamycin 60mg/m2 + Cytoxan 600mg/m2 every 14 days x 4 cycles with Neulasta OnPro support (Day 1), taxol 80mg/m2 x 1, followed by CMF x 4 [de-identified] : Patient presents today for follow up. She is tolerating metformin. She noted a rash in R armpit and groin last week that she thought was fungal in nature - applied some creams with very minimal improvement - she will see Dr. Logan this week. She also notes some sensitivity when she went out in the sun briefly. She feels well otherwise. No other complaints.

## 2019-06-24 ENCOUNTER — RX CHANGE (OUTPATIENT)
Age: 74
End: 2019-06-24

## 2019-06-24 LAB
ALBUMIN SERPL ELPH-MCNC: 4.2 G/DL
ALP BLD-CCNC: 88 U/L
ALT SERPL-CCNC: 37 U/L
ANION GAP SERPL CALC-SCNC: 17 MMOL/L
APTT BLD: 27.7 SEC
AST SERPL-CCNC: 24 U/L
BILIRUB SERPL-MCNC: 0.2 MG/DL
BUN SERPL-MCNC: 26 MG/DL
CALCIUM SERPL-MCNC: 9.4 MG/DL
CHLORIDE SERPL-SCNC: 105 MMOL/L
CO2 SERPL-SCNC: 21 MMOL/L
CREAT SERPL-MCNC: 1.2 MG/DL
GLUCOSE SERPL-MCNC: 172 MG/DL
INR PPP: 0.98 RATIO
MAGNESIUM SERPL-MCNC: 1.3 MG/DL
POTASSIUM SERPL-SCNC: 4 MMOL/L
PROT SERPL-MCNC: 6.1 G/DL
PT BLD: 11.3 SEC
SODIUM SERPL-SCNC: 143 MMOL/L

## 2019-06-24 RX ORDER — METFORMIN HYDROCHLORIDE 500 MG/1
500 TABLET, COATED ORAL TWICE DAILY
Qty: 60 | Refills: 5 | Status: DISCONTINUED | COMMUNITY
Start: 2019-05-31 | End: 2019-06-24

## 2019-06-26 ENCOUNTER — APPOINTMENT (OUTPATIENT)
Dept: DERMATOLOGY | Facility: CLINIC | Age: 74
End: 2019-06-26
Payer: MEDICARE

## 2019-06-26 ENCOUNTER — OUTPATIENT (OUTPATIENT)
Dept: OUTPATIENT SERVICES | Facility: HOSPITAL | Age: 74
LOS: 1 days | End: 2019-06-26

## 2019-06-26 VITALS
TEMPERATURE: 98 F | HEIGHT: 65 IN | SYSTOLIC BLOOD PRESSURE: 120 MMHG | RESPIRATION RATE: 16 BRPM | HEART RATE: 82 BPM | DIASTOLIC BLOOD PRESSURE: 76 MMHG | OXYGEN SATURATION: 98 % | WEIGHT: 197.09 LBS

## 2019-06-26 VITALS
HEART RATE: 84 BPM | BODY MASS INDEX: 32.78 KG/M2 | TEMPERATURE: 97.2 F | DIASTOLIC BLOOD PRESSURE: 78 MMHG | RESPIRATION RATE: 22 BRPM | SYSTOLIC BLOOD PRESSURE: 120 MMHG | OXYGEN SATURATION: 98 % | WEIGHT: 196.98 LBS

## 2019-06-26 DIAGNOSIS — Z90.711 ACQUIRED ABSENCE OF UTERUS WITH REMAINING CERVICAL STUMP: Chronic | ICD-10-CM

## 2019-06-26 DIAGNOSIS — C50.911 MALIGNANT NEOPLASM OF UNSPECIFIED SITE OF RIGHT FEMALE BREAST: ICD-10-CM

## 2019-06-26 DIAGNOSIS — Z98.891 HISTORY OF UTERINE SCAR FROM PREVIOUS SURGERY: Chronic | ICD-10-CM

## 2019-06-26 DIAGNOSIS — Z90.5 ACQUIRED ABSENCE OF KIDNEY: Chronic | ICD-10-CM

## 2019-06-26 DIAGNOSIS — I10 ESSENTIAL (PRIMARY) HYPERTENSION: ICD-10-CM

## 2019-06-26 DIAGNOSIS — L30.4 ERYTHEMA INTERTRIGO: ICD-10-CM

## 2019-06-26 DIAGNOSIS — E11.9 TYPE 2 DIABETES MELLITUS WITHOUT COMPLICATIONS: ICD-10-CM

## 2019-06-26 DIAGNOSIS — Z92.21 PERSONAL HISTORY OF ANTINEOPLASTIC CHEMOTHERAPY: Chronic | ICD-10-CM

## 2019-06-26 LAB
ANION GAP SERPL CALC-SCNC: 21 MMO/L — HIGH (ref 7–14)
BUN SERPL-MCNC: 24 MG/DL — HIGH (ref 7–23)
CALCIUM SERPL-MCNC: 9.7 MG/DL — SIGNIFICANT CHANGE UP (ref 8.4–10.5)
CHLORIDE SERPL-SCNC: 100 MMOL/L — SIGNIFICANT CHANGE UP (ref 98–107)
CO2 SERPL-SCNC: 19 MMOL/L — LOW (ref 22–31)
CREAT SERPL-MCNC: 0.99 MG/DL — SIGNIFICANT CHANGE UP (ref 0.5–1.3)
GLUCOSE SERPL-MCNC: 148 MG/DL — HIGH (ref 70–99)
HBA1C BLD-MCNC: 9.7 % — HIGH (ref 4–5.6)
HCT VFR BLD CALC: 31.3 % — LOW (ref 34.5–45)
HGB BLD-MCNC: 10.2 G/DL — LOW (ref 11.5–15.5)
MCHC RBC-ENTMCNC: 31.6 PG — SIGNIFICANT CHANGE UP (ref 27–34)
MCHC RBC-ENTMCNC: 32.6 % — SIGNIFICANT CHANGE UP (ref 32–36)
MCV RBC AUTO: 96.9 FL — SIGNIFICANT CHANGE UP (ref 80–100)
NRBC # FLD: 0 K/UL — SIGNIFICANT CHANGE UP (ref 0–0)
PLATELET # BLD AUTO: 192 K/UL — SIGNIFICANT CHANGE UP (ref 150–400)
PMV BLD: 11 FL — SIGNIFICANT CHANGE UP (ref 7–13)
POTASSIUM SERPL-MCNC: 3.5 MMOL/L — SIGNIFICANT CHANGE UP (ref 3.5–5.3)
POTASSIUM SERPL-SCNC: 3.5 MMOL/L — SIGNIFICANT CHANGE UP (ref 3.5–5.3)
RBC # BLD: 3.23 M/UL — LOW (ref 3.8–5.2)
RBC # FLD: 15.4 % — HIGH (ref 10.3–14.5)
SODIUM SERPL-SCNC: 140 MMOL/L — SIGNIFICANT CHANGE UP (ref 135–145)
WBC # BLD: 7.74 K/UL — SIGNIFICANT CHANGE UP (ref 3.8–10.5)
WBC # FLD AUTO: 7.74 K/UL — SIGNIFICANT CHANGE UP (ref 3.8–10.5)

## 2019-06-26 PROCEDURE — 99214 OFFICE O/P EST MOD 30 MIN: CPT

## 2019-06-26 RX ORDER — OMEPRAZOLE 10 MG/1
1 CAPSULE, DELAYED RELEASE ORAL
Qty: 0 | Refills: 0 | DISCHARGE

## 2019-06-26 NOTE — H&P PST ADULT - RS GEN PE MLT RESP DETAILS PC
airway patent/no chest wall tenderness/clear to auscultation bilaterally/respirations non-labored/good air movement/breath sounds equal

## 2019-06-26 NOTE — H&P PST ADULT - NSICDXPASTMEDICALHX_GEN_ALL_CORE_FT
PAST MEDICAL HISTORY:  Diabetes type 2, controlled steriod induced    Elevated serum creatinine     Essential hypertension     Gastroesophageal reflux disease without esophagitis     H/O kidney donation 1982    Malignant neoplasm of breast (female) right    Obesity PAST MEDICAL HISTORY:  Diabetes type 2, controlled steroid induced    Elevated serum creatinine     Essential hypertension     Gastroesophageal reflux disease without esophagitis     H/O kidney donation 1982    Malignant neoplasm of breast (female) right    Obesity

## 2019-06-26 NOTE — H&P PST ADULT - NSICDXPROBLEM_GEN_ALL_CORE_FT
PROBLEM DIAGNOSES  Problem: Malignant neoplasm of unspecified site of right female breast  Assessment and Plan: Scheduled for Bilateral Mastectomy, Right Axillary Kelli  Localization, Bilateral Axillary [place] Lymph Node Biopsy, Possible Dissection on 7/11/2019.  Preop instructions given, pt verbalized understanding   Pt can take prescribed Omeprazole AM of surgery with sips of water  Chlorhexidine wash provided. Pt demonstrated understanding of wash instructions using teach back     Problem: Hypertension  Assessment and Plan: Pt to take Valsartan/HCTZ and Carvedilol AM of surgery with sips of water  Last Cardiac visit note on chart with current EKG and ECHO     Problem: Type 2 diabetes mellitus  Assessment and Plan: Pt told Metformin for 24 hours preop  Accu check to be assessed on admission

## 2019-06-26 NOTE — H&P PST ADULT - NEGATIVE NEUROLOGICAL SYMPTOMS
no tremors/no confusion/no paresthesias/no generalized seizures/no focal seizures/no vertigo/no loss of sensation/no difficulty walking/no loss of consciousness/no hemiparesis/no facial palsy/no syncope/no headache/no transient paralysis/no weakness

## 2019-06-26 NOTE — H&P PST ADULT - SKIN/BREAST COMMENTS
pt developed a rash to her lower extremities after receiving Taxol- now resolved. dx of right breast CA. see HPI

## 2019-06-26 NOTE — H&P PST ADULT - HISTORY OF PRESENT ILLNESS
74 y/o female presents to PST for preoperative evaluation with dx of malignant neoplasm  of right female breast. Pt diagnosed with right breast cancer in Dec 2018 and started chemotherapy in Jan. She is now preop for surgery. Scheduled for Bilateral Mastectomy, Right Axillary Kelli  Localization, Bilateral Axillary [place] Lymph Node Biopsy, Possible Dissection on 7/11/2019. Pt completed chemotherapy 2 weeks ago. 72 y/o female presents to PST for preoperative evaluation with dx of malignant neoplasm of right female breast. Pt diagnosed with right breast cancer in Dec 2018 and started chemotherapy in Jan. She is now preop for surgery. Scheduled for Bilateral Mastectomy, Right Axillary Kelli  Localization, Bilateral Axillary [place] Lymph Node Biopsy, Possible Dissection on 7/11/2019. Pt completed chemotherapy 2 weeks ago.

## 2019-06-26 NOTE — H&P PST ADULT - NSICDXFAMILYHX_GEN_ALL_CORE_FT
FAMILY HISTORY:  Father  Still living? No  Family history of cirrhosis of liver, Age at diagnosis: Age Unknown    Mother  Still living? Yes, Estimated age:   Family history of COPD (chronic obstructive pulmonary disease), Age at diagnosis: Age Unknown  Family history of hypertension in mother, Age at diagnosis: Age Unknown    Sibling  Still living? No  Family history of type 2 diabetes mellitus in brother, Age at diagnosis: Age Unknown    Child  Still living? Yes, Estimated age: Age Unknown  Family history of hypertension, Age at diagnosis: Age Unknown

## 2019-06-26 NOTE — H&P PST ADULT - NSICDXPASTSURGICALHX_GEN_ALL_CORE_FT
PAST SURGICAL HISTORY:  History of      History of cancer chemotherapy s/p port placement 2019    History of unilateral nephrectomy kidney donor - left    S/P partial hysterectomy uterine fibroids

## 2019-06-26 NOTE — H&P PST ADULT - NEGATIVE OPHTHALMOLOGIC SYMPTOMS
no diplopia/no lacrimation L/no lacrimation R/no photophobia/no blurred vision R/no discharge R/no blurred vision L/no discharge L

## 2019-06-27 ENCOUNTER — FORM ENCOUNTER (OUTPATIENT)
Age: 74
End: 2019-06-27

## 2019-06-27 ENCOUNTER — RX RENEWAL (OUTPATIENT)
Age: 74
End: 2019-06-27

## 2019-06-28 ENCOUNTER — RESULT REVIEW (OUTPATIENT)
Age: 74
End: 2019-06-28

## 2019-06-28 ENCOUNTER — APPOINTMENT (OUTPATIENT)
Dept: HEMATOLOGY ONCOLOGY | Facility: CLINIC | Age: 74
End: 2019-06-28

## 2019-06-28 ENCOUNTER — APPOINTMENT (OUTPATIENT)
Dept: ULTRASOUND IMAGING | Facility: IMAGING CENTER | Age: 74
End: 2019-06-28
Payer: MEDICARE

## 2019-06-28 ENCOUNTER — OUTPATIENT (OUTPATIENT)
Dept: OUTPATIENT SERVICES | Facility: HOSPITAL | Age: 74
LOS: 1 days | End: 2019-06-28
Payer: MEDICARE

## 2019-06-28 DIAGNOSIS — Z92.21 PERSONAL HISTORY OF ANTINEOPLASTIC CHEMOTHERAPY: Chronic | ICD-10-CM

## 2019-06-28 DIAGNOSIS — Z00.8 ENCOUNTER FOR OTHER GENERAL EXAMINATION: ICD-10-CM

## 2019-06-28 DIAGNOSIS — Z98.891 HISTORY OF UTERINE SCAR FROM PREVIOUS SURGERY: Chronic | ICD-10-CM

## 2019-06-28 DIAGNOSIS — Z90.711 ACQUIRED ABSENCE OF UTERUS WITH REMAINING CERVICAL STUMP: Chronic | ICD-10-CM

## 2019-06-28 DIAGNOSIS — Z90.5 ACQUIRED ABSENCE OF KIDNEY: Chronic | ICD-10-CM

## 2019-06-28 PROBLEM — E11.9 TYPE 2 DIABETES MELLITUS WITHOUT COMPLICATIONS: Chronic | Status: ACTIVE | Noted: 2019-06-26

## 2019-06-28 PROBLEM — R79.89 OTHER SPECIFIED ABNORMAL FINDINGS OF BLOOD CHEMISTRY: Chronic | Status: ACTIVE | Noted: 2019-06-26

## 2019-06-28 LAB
BASOPHILS # BLD AUTO: 0 K/UL — SIGNIFICANT CHANGE UP (ref 0–0.2)
BASOPHILS NFR BLD AUTO: 0.2 % — SIGNIFICANT CHANGE UP (ref 0–2)
EOSINOPHIL # BLD AUTO: 0.1 K/UL — SIGNIFICANT CHANGE UP (ref 0–0.5)
EOSINOPHIL NFR BLD AUTO: 1.4 % — SIGNIFICANT CHANGE UP (ref 0–6)
HCT VFR BLD CALC: 31.2 % — LOW (ref 34.5–45)
HGB BLD-MCNC: 10.6 G/DL — LOW (ref 11.5–15.5)
LYMPHOCYTES # BLD AUTO: 1 K/UL — SIGNIFICANT CHANGE UP (ref 1–3.3)
LYMPHOCYTES # BLD AUTO: 14.2 % — SIGNIFICANT CHANGE UP (ref 13–44)
MCHC RBC-ENTMCNC: 32.6 PG — SIGNIFICANT CHANGE UP (ref 27–34)
MCHC RBC-ENTMCNC: 34.1 G/DL — SIGNIFICANT CHANGE UP (ref 32–36)
MCV RBC AUTO: 95.7 FL — SIGNIFICANT CHANGE UP (ref 80–100)
MONOCYTES # BLD AUTO: 0.9 K/UL — SIGNIFICANT CHANGE UP (ref 0–0.9)
MONOCYTES NFR BLD AUTO: 12.5 % — SIGNIFICANT CHANGE UP (ref 2–14)
NEUTROPHILS # BLD AUTO: 5.1 K/UL — SIGNIFICANT CHANGE UP (ref 1.8–7.4)
NEUTROPHILS NFR BLD AUTO: 71.7 % — SIGNIFICANT CHANGE UP (ref 43–77)
PLATELET # BLD AUTO: 183 K/UL — SIGNIFICANT CHANGE UP (ref 150–400)
RBC # BLD: 3.26 M/UL — LOW (ref 3.8–5.2)
RBC # FLD: 14.9 % — HIGH (ref 10.3–14.5)
WBC # BLD: 7.1 K/UL — SIGNIFICANT CHANGE UP (ref 3.8–10.5)
WBC # FLD AUTO: 7.1 K/UL — SIGNIFICANT CHANGE UP (ref 3.8–10.5)

## 2019-06-28 PROCEDURE — C1739: CPT

## 2019-06-28 PROCEDURE — 19285 PERQ DEV BREAST 1ST US IMAG: CPT | Mod: RT

## 2019-06-28 PROCEDURE — 19285 PERQ DEV BREAST 1ST US IMAG: CPT

## 2019-07-01 LAB
ALBUMIN SERPL ELPH-MCNC: 4.4 G/DL
ALP BLD-CCNC: 62 U/L
ALT SERPL-CCNC: 41 U/L
ANION GAP SERPL CALC-SCNC: 12 MMOL/L
AST SERPL-CCNC: 31 U/L
BILIRUB SERPL-MCNC: 0.3 MG/DL
BUN SERPL-MCNC: 25 MG/DL
CALCIUM SERPL-MCNC: 9.4 MG/DL
CHLORIDE SERPL-SCNC: 105 MMOL/L
CO2 SERPL-SCNC: 24 MMOL/L
CREAT SERPL-MCNC: 1.12 MG/DL
GLUCOSE SERPL-MCNC: 128 MG/DL
MAGNESIUM SERPL-MCNC: 1.6 MG/DL
POTASSIUM SERPL-SCNC: 3.4 MMOL/L
PROT SERPL-MCNC: 6.2 G/DL
SODIUM SERPL-SCNC: 141 MMOL/L

## 2019-07-02 ENCOUNTER — APPOINTMENT (OUTPATIENT)
Dept: INTERNAL MEDICINE | Facility: CLINIC | Age: 74
End: 2019-07-02
Payer: MEDICARE

## 2019-07-02 ENCOUNTER — MEDICATION RENEWAL (OUTPATIENT)
Age: 74
End: 2019-07-02

## 2019-07-02 VITALS — DIASTOLIC BLOOD PRESSURE: 68 MMHG | SYSTOLIC BLOOD PRESSURE: 120 MMHG

## 2019-07-02 VITALS — HEART RATE: 93 BPM | HEIGHT: 65 IN | RESPIRATION RATE: 18 BRPM | BODY MASS INDEX: 32.82 KG/M2 | WEIGHT: 197 LBS

## 2019-07-02 PROCEDURE — 99214 OFFICE O/P EST MOD 30 MIN: CPT

## 2019-07-02 NOTE — ASSESSMENT
[Patient Optimized for Surgery] : Patient optimized for surgery [No Further Testing Recommended] : no further testing recommended [Modify medications prior to procedure] : Modify medications prior to procedure [As per surgery] : as per surgery [FreeTextEntry4] : 72 y/o female with a hx of HTN , kidney donor, breast mass and lad on mammo/sono \par s/p chemo for Br Ca\par she will be getting mastectomy\par here for pre-op eval.\par Has been following with cardiology.  Echo has been unchanged.  Has been maintained on current rx.   \par Reports that the FS has been better controlled.   \par BP has been controlled. \par The patient has had no CV sx.\par She has been walking for exercise and is Able to go on stairs and hills w/o probs.  she had been going on the bike prior to the port bothering her.  \par no issues with bleeding or bruising.  No clotting issues..\par she is s/p recent visit with cardiology for f/u and in anticipation of the planned procedure.\par There should be no medical contraindications for the planned procedure.  The patient is aware that perisugically, she will be havint the FS glucose followed and will be receiving SSI.\par Creatinine nl.  given her hx, would take care to maintain adequate hydration and avoid nephrotoxins as able.   [FreeTextEntry7] : hold metformin the night before and day of the surgery

## 2019-07-02 NOTE — RESULTS/DATA
[] : results reviewed [de-identified] : h/h stable, wbc and plt nl [de-identified] : a1c 9.7 [de-identified] : sinus@, pac, wnl [de-identified] : cr 0.99, gluc 148

## 2019-07-02 NOTE — HISTORY OF PRESENT ILLNESS
[No Pertinent Pulmonary History] : no history of asthma, COPD, sleep apnea, or smoking [No Pertinent Cardiac History] : no history of aortic stenosis, atrial fibrillation, coronary artery disease, recent myocardial infarction, or implantable device/pacemaker [No Adverse Anesthesia Reaction] : no adverse anesthesia reaction in self or family member [(Patient denies any chest pain, claudication, dyspnea on exertion, orthopnea, palpitations or syncope)] : Patient denies any chest pain, claudication, dyspnea on exertion, orthopnea, palpitations or syncope [Chronic Kidney Disease] : no chronic kidney disease [Chronic Anticoagulation] : no chronic anticoagulation [Diabetes] : no diabetes [FreeTextEntry1] : bilateral mastectomy [FreeTextEntry2] : 7/11/19 [FreeTextEntry3] : Dr Ailyn Davis [FreeTextEntry8] : moderate to good [FreeTextEntry4] : 72 y/o female with a hx of HTN , kidney donor, breast mass and lad on mammo/sono \par here for pre-op eval.\par Has been following with cardiology.  Echo has been unchanged.  Has been maintained on current rx.   \par Had reactions to chemotx with oncology. Had received doses of steroids. Has been getting steroids since she has been getting the chemo. Now getting only with chemo. Has completed chemo.\par Reports that the FS has been better controlled.  Has been in the 130's to 150's at home.  occ up to 170.  \par Recent labs with FS of 127.  Pre-op labs with FS of 148.\par BP has been controlled. \par appetite has been about the same - fair.  Has been trying to follow lower carb diet\par Walking for exercise Able to go on stairs and hills w/o probs.  Had been going on the bike prior to the port bothering her.  \par no issues with bleeding or bruising.  No clotting issues..\par Aches have been better.\par with recent rash - resolved.  \par \par mammo/sono - Br Ca as noted.\par pap overdue.\par \par Had flu vaccine\par had prevnar

## 2019-07-02 NOTE — PHYSICAL EXAM
[Normal Posterior Cervical Nodes] : no posterior cervical lymphadenopathy [Normal Anterior Cervical Nodes] : no anterior cervical lymphadenopathy [Normal] : affect was normal and insight and judgment were intact [de-identified] : corwingenes

## 2019-07-02 NOTE — COUNSELING
[Weight management counseling provided] : Weight management [Healthy eating counseling provided] : healthy eating [Activity counseling provided] : activity [Good understanding] : Patient has a good understanding of disease, goals and obesity follow-up plan [Decrease Portions] : Decrease food portions [Low Fat Diet] : Low fat diet [Low Salt Diet] : Low salt diet [Walking] : Walking [None] : None

## 2019-07-02 NOTE — REVIEW OF SYSTEMS
[Skin Rash] : skin rash [Negative] : Heme/Lymph [Fever] : no fever [Chills] : no chills [Night Sweats] : no night sweats [Hot Flashes] : no hot flashes [Fatigue] : no fatigue [Recent Change In Weight] : ~T no recent weight change [FreeTextEntry7] : altered taste from the chemo [FreeTextEntry9] : aches improved as noted.

## 2019-07-02 NOTE — CONSULT LETTER
[Dear  ___] : Dear  [unfilled], [Consult Letter:] : I had the pleasure of evaluating your patient, [unfilled]. [Consult Closing:] : Thank you very much for allowing me to participate in the care of this patient.  If you have any questions, please do not hesitate to contact me. [Please see my note below.] : Please see my note below. [Sincerely,] : Sincerely, [FreeTextEntry3] : Mac Lafleur MD

## 2019-07-10 ENCOUNTER — TRANSCRIPTION ENCOUNTER (OUTPATIENT)
Age: 74
End: 2019-07-10

## 2019-07-10 NOTE — ASU PATIENT PROFILE, ADULT - PMH
Diabetes type 2, controlled  steroid induced  Elevated serum creatinine    Essential hypertension    Gastroesophageal reflux disease without esophagitis    H/O kidney donation  1982  Malignant neoplasm of breast (female)  right  Obesity

## 2019-07-10 NOTE — ASU PATIENT PROFILE, ADULT - PSH
History of     History of cancer chemotherapy  s/p port placement 2019  History of unilateral nephrectomy  kidney donor - left  S/P partial hysterectomy  uterine fibroids

## 2019-07-11 ENCOUNTER — APPOINTMENT (OUTPATIENT)
Dept: SURGICAL ONCOLOGY | Facility: HOSPITAL | Age: 74
End: 2019-07-11

## 2019-07-11 ENCOUNTER — RESULT REVIEW (OUTPATIENT)
Age: 74
End: 2019-07-11

## 2019-07-11 ENCOUNTER — INPATIENT (INPATIENT)
Facility: HOSPITAL | Age: 74
LOS: 0 days | Discharge: HOME | End: 2019-07-12
Attending: SURGERY | Admitting: SURGERY
Payer: MEDICARE

## 2019-07-11 ENCOUNTER — APPOINTMENT (OUTPATIENT)
Dept: NUCLEAR MEDICINE | Facility: HOSPITAL | Age: 74
End: 2019-07-11

## 2019-07-11 VITALS
HEART RATE: 85 BPM | TEMPERATURE: 98 F | DIASTOLIC BLOOD PRESSURE: 76 MMHG | HEIGHT: 65 IN | SYSTOLIC BLOOD PRESSURE: 139 MMHG | RESPIRATION RATE: 16 BRPM | WEIGHT: 197.09 LBS | OXYGEN SATURATION: 95 %

## 2019-07-11 DIAGNOSIS — C50.911 MALIGNANT NEOPLASM OF UNSPECIFIED SITE OF RIGHT FEMALE BREAST: ICD-10-CM

## 2019-07-11 DIAGNOSIS — Z98.891 HISTORY OF UTERINE SCAR FROM PREVIOUS SURGERY: Chronic | ICD-10-CM

## 2019-07-11 DIAGNOSIS — Z90.711 ACQUIRED ABSENCE OF UTERUS WITH REMAINING CERVICAL STUMP: Chronic | ICD-10-CM

## 2019-07-11 DIAGNOSIS — Z90.5 ACQUIRED ABSENCE OF KIDNEY: Chronic | ICD-10-CM

## 2019-07-11 DIAGNOSIS — Z92.21 PERSONAL HISTORY OF ANTINEOPLASTIC CHEMOTHERAPY: Chronic | ICD-10-CM

## 2019-07-11 LAB
GLUCOSE BLDC GLUCOMTR-MCNC: 185 MG/DL — HIGH (ref 70–99)
GLUCOSE BLDC GLUCOMTR-MCNC: 222 MG/DL — HIGH (ref 70–99)
GLUCOSE BLDC GLUCOMTR-MCNC: 276 MG/DL — HIGH (ref 70–99)

## 2019-07-11 PROCEDURE — 19303 MAST SIMPLE COMPLETE: CPT | Mod: 50

## 2019-07-11 PROCEDURE — 88307 TISSUE EXAM BY PATHOLOGIST: CPT | Mod: 26

## 2019-07-11 PROCEDURE — 88332 PATH CONSLTJ SURG EA ADD BLK: CPT | Mod: 26

## 2019-07-11 PROCEDURE — 38900 IO MAP OF SENT LYMPH NODE: CPT

## 2019-07-11 PROCEDURE — 88331 PATH CONSLTJ SURG 1 BLK 1SPC: CPT | Mod: 26

## 2019-07-11 PROCEDURE — 19303 MAST SIMPLE COMPLETE: CPT | Mod: 82,RT

## 2019-07-11 PROCEDURE — 38792 RA TRACER ID OF SENTINL NODE: CPT | Mod: 59

## 2019-07-11 PROCEDURE — 38740 REMOVE ARMPIT LYMPH NODES: CPT | Mod: 50,59

## 2019-07-11 PROCEDURE — 38308 INCISION OF LYMPH CHANNELS: CPT

## 2019-07-11 PROCEDURE — 76098 X-RAY EXAM SURGICAL SPECIMEN: CPT | Mod: 26

## 2019-07-11 RX ORDER — DEXTROSE 50 % IN WATER 50 %
12.5 SYRINGE (ML) INTRAVENOUS ONCE
Refills: 0 | Status: DISCONTINUED | OUTPATIENT
Start: 2019-07-11 | End: 2019-07-12

## 2019-07-11 RX ORDER — ONDANSETRON 8 MG/1
4 TABLET, FILM COATED ORAL EVERY 6 HOURS
Refills: 0 | Status: DISCONTINUED | OUTPATIENT
Start: 2019-07-11 | End: 2019-07-12

## 2019-07-11 RX ORDER — HEPARIN SODIUM 5000 [USP'U]/ML
5000 INJECTION INTRAVENOUS; SUBCUTANEOUS EVERY 8 HOURS
Refills: 0 | Status: DISCONTINUED | OUTPATIENT
Start: 2019-07-11 | End: 2019-07-12

## 2019-07-11 RX ORDER — INSULIN LISPRO 100/ML
VIAL (ML) SUBCUTANEOUS
Refills: 0 | Status: DISCONTINUED | OUTPATIENT
Start: 2019-07-11 | End: 2019-07-12

## 2019-07-11 RX ORDER — SODIUM CHLORIDE 9 MG/ML
1000 INJECTION, SOLUTION INTRAVENOUS
Refills: 0 | Status: DISCONTINUED | OUTPATIENT
Start: 2019-07-11 | End: 2019-07-12

## 2019-07-11 RX ORDER — CARVEDILOL PHOSPHATE 80 MG/1
12.5 CAPSULE, EXTENDED RELEASE ORAL ONCE
Refills: 0 | Status: COMPLETED | OUTPATIENT
Start: 2019-07-11 | End: 2019-07-11

## 2019-07-11 RX ORDER — DEXTROSE 50 % IN WATER 50 %
15 SYRINGE (ML) INTRAVENOUS ONCE
Refills: 0 | Status: DISCONTINUED | OUTPATIENT
Start: 2019-07-11 | End: 2019-07-12

## 2019-07-11 RX ORDER — OXYCODONE HYDROCHLORIDE 5 MG/1
5 TABLET ORAL EVERY 4 HOURS
Refills: 0 | Status: DISCONTINUED | OUTPATIENT
Start: 2019-07-11 | End: 2019-07-12

## 2019-07-11 RX ORDER — DEXTROSE 50 % IN WATER 50 %
25 SYRINGE (ML) INTRAVENOUS ONCE
Refills: 0 | Status: DISCONTINUED | OUTPATIENT
Start: 2019-07-11 | End: 2019-07-12

## 2019-07-11 RX ORDER — CARVEDILOL PHOSPHATE 80 MG/1
12.5 CAPSULE, EXTENDED RELEASE ORAL EVERY 12 HOURS
Refills: 0 | Status: DISCONTINUED | OUTPATIENT
Start: 2019-07-11 | End: 2019-07-12

## 2019-07-11 RX ORDER — HYDROMORPHONE HYDROCHLORIDE 2 MG/ML
0.5 INJECTION INTRAMUSCULAR; INTRAVENOUS; SUBCUTANEOUS
Refills: 0 | Status: DISCONTINUED | OUTPATIENT
Start: 2019-07-11 | End: 2019-07-12

## 2019-07-11 RX ORDER — ATORVASTATIN CALCIUM 80 MG/1
5 TABLET, FILM COATED ORAL AT BEDTIME
Refills: 0 | Status: DISCONTINUED | OUTPATIENT
Start: 2019-07-11 | End: 2019-07-12

## 2019-07-11 RX ORDER — GLUCAGON INJECTION, SOLUTION 0.5 MG/.1ML
1 INJECTION, SOLUTION SUBCUTANEOUS ONCE
Refills: 0 | Status: DISCONTINUED | OUTPATIENT
Start: 2019-07-11 | End: 2019-07-12

## 2019-07-11 RX ORDER — ONDANSETRON 8 MG/1
4 TABLET, FILM COATED ORAL ONCE
Refills: 0 | Status: DISCONTINUED | OUTPATIENT
Start: 2019-07-11 | End: 2019-07-12

## 2019-07-11 RX ORDER — PANTOPRAZOLE SODIUM 20 MG/1
40 TABLET, DELAYED RELEASE ORAL
Refills: 0 | Status: DISCONTINUED | OUTPATIENT
Start: 2019-07-11 | End: 2019-07-12

## 2019-07-11 RX ORDER — ACETAMINOPHEN 500 MG
650 TABLET ORAL EVERY 6 HOURS
Refills: 0 | Status: DISCONTINUED | OUTPATIENT
Start: 2019-07-11 | End: 2019-07-12

## 2019-07-11 RX ORDER — INSULIN LISPRO 100/ML
VIAL (ML) SUBCUTANEOUS AT BEDTIME
Refills: 0 | Status: DISCONTINUED | OUTPATIENT
Start: 2019-07-11 | End: 2019-07-12

## 2019-07-11 RX ADMIN — Medication 650 MILLIGRAM(S): at 22:08

## 2019-07-11 RX ADMIN — HEPARIN SODIUM 5000 UNIT(S): 5000 INJECTION INTRAVENOUS; SUBCUTANEOUS at 22:12

## 2019-07-11 RX ADMIN — SODIUM CHLORIDE 125 MILLILITER(S): 9 INJECTION, SOLUTION INTRAVENOUS at 20:13

## 2019-07-11 RX ADMIN — Medication 650 MILLIGRAM(S): at 22:40

## 2019-07-11 RX ADMIN — ATORVASTATIN CALCIUM 5 MILLIGRAM(S): 80 TABLET, FILM COATED ORAL at 22:11

## 2019-07-11 RX ADMIN — CARVEDILOL PHOSPHATE 12.5 MILLIGRAM(S): 80 CAPSULE, EXTENDED RELEASE ORAL at 20:10

## 2019-07-11 RX ADMIN — CARVEDILOL PHOSPHATE 12.5 MILLIGRAM(S): 80 CAPSULE, EXTENDED RELEASE ORAL at 23:05

## 2019-07-11 NOTE — BRIEF OPERATIVE NOTE - NSICDXBRIEFPROCEDURE_GEN_ALL_CORE_FT
PROCEDURES:  Bilateral mastectomy with sentinel lymph node biopsy 11-Jul-2019 14:45:19  Iraj Campbell

## 2019-07-11 NOTE — PROGRESS NOTE ADULT - SUBJECTIVE AND OBJECTIVE BOX
POST-OPERATIVE NOTE    Subjective:  Patient is s/p b/l mastectomy with SNLB  . Recovering appropriately. Patient denies any pain nausea or vomiting. Dressing are c/d/i without hematoma. Drains are putting out serosanguinous fluid. Now being transferred to the floor.    Vital Signs Last 24 Hrs  T(C): 36.8 (2019 16:00), Max: 36.9 (2019 14:40)  T(F): 98.2 (2019 16:00), Max: 98.4 (2019 14:40)  HR: 98 (2019 18:30) (70 - 98)  BP: 141/74 (2019 18:30) (123/74 - 144/80)  BP(mean): 92 (2019 18:00) (82 - 103)  RR: 20 (2019 18:30) (13 - 22)  SpO2: 96% (2019 18:30) (92% - 97%)  I&O's Detail    2019 07:01  -  2019 19:16  --------------------------------------------------------  IN:    lactated ringers.: 500 mL  Total IN: 500 mL    OUT:    Bulb: 80 mL    Bulb: 80 mL  Total OUT: 160 mL    Total NET: 340 mL        carvedilol 12.5  heparin  Injectable 5000    PAST MEDICAL & SURGICAL HISTORY:  H/O kidney donation:   Elevated serum creatinine  Diabetes type 2, controlled: steroid induced  Obesity  Malignant neoplasm of breast (female): right  Gastroesophageal reflux disease without esophagitis  Essential hypertension  History of cancer chemotherapy: s/p port placement 2019  History of   S/P partial hysterectomy: uterine fibroids  History of unilateral nephrectomy: kidney donor - left        Physical Exam:  General: NAD, resting comfortably in bed  Pulmonary: Nonlabored breathing, no respiratory distress  Cardiovascular: NSR  Abdominal: soft, NT/ND        LABS:            CAPILLARY BLOOD GLUCOSE      POCT Blood Glucose.: 222 mg/dL (2019 18:35)  POCT Blood Glucose.: 185 mg/dL (2019 07:17)      Radiology and Additional Studies:    Assessment:  The patient is a 73y Female who is now several hours post-op from a b/l mastectomy with SNLB for breast cancer,  Plan:  - Pain control as needed  - DVT ppx  - OOB and ambulating as tolerated  - F/u AM labs
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
## 2019-07-12 ENCOUNTER — TRANSCRIPTION ENCOUNTER (OUTPATIENT)
Age: 74
End: 2019-07-12

## 2019-07-12 VITALS
OXYGEN SATURATION: 98 % | RESPIRATION RATE: 18 BRPM | SYSTOLIC BLOOD PRESSURE: 115 MMHG | HEART RATE: 83 BPM | DIASTOLIC BLOOD PRESSURE: 63 MMHG

## 2019-07-12 LAB
ANION GAP SERPL CALC-SCNC: 10 MMO/L — SIGNIFICANT CHANGE UP (ref 7–14)
BASOPHILS # BLD AUTO: 0.01 K/UL — SIGNIFICANT CHANGE UP (ref 0–0.2)
BASOPHILS NFR BLD AUTO: 0.1 % — SIGNIFICANT CHANGE UP (ref 0–2)
BUN SERPL-MCNC: 21 MG/DL — SIGNIFICANT CHANGE UP (ref 7–23)
CALCIUM SERPL-MCNC: 9.1 MG/DL — SIGNIFICANT CHANGE UP (ref 8.4–10.5)
CHLORIDE SERPL-SCNC: 102 MMOL/L — SIGNIFICANT CHANGE UP (ref 98–107)
CO2 SERPL-SCNC: 24 MMOL/L — SIGNIFICANT CHANGE UP (ref 22–31)
CREAT SERPL-MCNC: 1.09 MG/DL — SIGNIFICANT CHANGE UP (ref 0.5–1.3)
EOSINOPHIL # BLD AUTO: 0 K/UL — SIGNIFICANT CHANGE UP (ref 0–0.5)
EOSINOPHIL NFR BLD AUTO: 0 % — SIGNIFICANT CHANGE UP (ref 0–6)
GLUCOSE BLDC GLUCOMTR-MCNC: 206 MG/DL — HIGH (ref 70–99)
GLUCOSE BLDC GLUCOMTR-MCNC: 229 MG/DL — HIGH (ref 70–99)
GLUCOSE SERPL-MCNC: 245 MG/DL — HIGH (ref 70–99)
HCT VFR BLD CALC: 29.7 % — LOW (ref 34.5–45)
HGB BLD-MCNC: 9.8 G/DL — LOW (ref 11.5–15.5)
IMM GRANULOCYTES NFR BLD AUTO: 0.4 % — SIGNIFICANT CHANGE UP (ref 0–1.5)
LYMPHOCYTES # BLD AUTO: 0.61 K/UL — LOW (ref 1–3.3)
LYMPHOCYTES # BLD AUTO: 5.6 % — LOW (ref 13–44)
MCHC RBC-ENTMCNC: 31.1 PG — SIGNIFICANT CHANGE UP (ref 27–34)
MCHC RBC-ENTMCNC: 33 % — SIGNIFICANT CHANGE UP (ref 32–36)
MCV RBC AUTO: 94.3 FL — SIGNIFICANT CHANGE UP (ref 80–100)
MONOCYTES # BLD AUTO: 0.62 K/UL — SIGNIFICANT CHANGE UP (ref 0–0.9)
MONOCYTES NFR BLD AUTO: 5.7 % — SIGNIFICANT CHANGE UP (ref 2–14)
NEUTROPHILS # BLD AUTO: 9.6 K/UL — HIGH (ref 1.8–7.4)
NEUTROPHILS NFR BLD AUTO: 88.2 % — HIGH (ref 43–77)
NRBC # FLD: 0 K/UL — SIGNIFICANT CHANGE UP (ref 0–0)
PLATELET # BLD AUTO: 202 K/UL — SIGNIFICANT CHANGE UP (ref 150–400)
PMV BLD: 10.4 FL — SIGNIFICANT CHANGE UP (ref 7–13)
POTASSIUM SERPL-MCNC: 4.2 MMOL/L — SIGNIFICANT CHANGE UP (ref 3.5–5.3)
POTASSIUM SERPL-SCNC: 4.2 MMOL/L — SIGNIFICANT CHANGE UP (ref 3.5–5.3)
RBC # BLD: 3.15 M/UL — LOW (ref 3.8–5.2)
RBC # FLD: 13.9 % — SIGNIFICANT CHANGE UP (ref 10.3–14.5)
SODIUM SERPL-SCNC: 136 MMOL/L — SIGNIFICANT CHANGE UP (ref 135–145)
WBC # BLD: 10.88 K/UL — HIGH (ref 3.8–10.5)
WBC # FLD AUTO: 10.88 K/UL — HIGH (ref 3.8–10.5)

## 2019-07-12 PROCEDURE — 99238 HOSP IP/OBS DSCHRG MGMT 30/<: CPT | Mod: 24

## 2019-07-12 RX ORDER — OXYCODONE HYDROCHLORIDE 5 MG/1
1 TABLET ORAL
Qty: 12 | Refills: 0
Start: 2019-07-12

## 2019-07-12 RX ADMIN — Medication 650 MILLIGRAM(S): at 09:06

## 2019-07-12 RX ADMIN — Medication 4: at 12:38

## 2019-07-12 RX ADMIN — HEPARIN SODIUM 5000 UNIT(S): 5000 INJECTION INTRAVENOUS; SUBCUTANEOUS at 06:04

## 2019-07-12 RX ADMIN — PANTOPRAZOLE SODIUM 40 MILLIGRAM(S): 20 TABLET, DELAYED RELEASE ORAL at 06:04

## 2019-07-12 RX ADMIN — Medication 650 MILLIGRAM(S): at 08:36

## 2019-07-12 RX ADMIN — CARVEDILOL PHOSPHATE 12.5 MILLIGRAM(S): 80 CAPSULE, EXTENDED RELEASE ORAL at 08:36

## 2019-07-12 RX ADMIN — Medication 4: at 08:18

## 2019-07-12 RX ADMIN — HEPARIN SODIUM 5000 UNIT(S): 5000 INJECTION INTRAVENOUS; SUBCUTANEOUS at 14:09

## 2019-07-12 NOTE — PROVIDER CONTACT NOTE (OTHER) - ASSESSMENT
Patient complaining of abdominal distention. Abdomen soft and no pain upon palpation. Audible bowel sounds in all 4 quadrants

## 2019-07-12 NOTE — DISCHARGE NOTE NURSING/CASE MANAGEMENT/SOCIAL WORK - NSDCDPATPORTLINK_GEN_ALL_CORE
You can access the [facility] Patient Portal, offered by [facility], by registering with the following website: [email or web address]

## 2019-07-12 NOTE — DISCHARGE NOTE PROVIDER - CARE PROVIDER_API CALL
Demetrio Ndiaye)  Surgery  [address] [facility], Division [place] [address]  Phone: [phone]  Fax: [phone]  Follow Up Time:

## 2019-07-12 NOTE — PROGRESS NOTE ADULT - SUBJECTIVE AND OBJECTIVE BOX
Team D Surgery Progress Note     SUBJECTIVE / 24H EVENTS  Patient seen and examined on morning rounds. No acute events overnight.    OBJECTIVE:      Vital Signs Last 24 Hrs  T(C): 36.8 (12 Jul 2019 01:08), Max: 36.9 (11 Jul 2019 14:40)  T(F): 98.2 (12 Jul 2019 01:08), Max: 98.4 (11 Jul 2019 14:40)  HR: 76 (12 Jul 2019 01:08) (70 - 98)  BP: 135/65 (12 Jul 2019 01:08) (123/74 - 145/75)  BP(mean): 92 (11 Jul 2019 18:00) (82 - 103)  RR: 20 (12 Jul 2019 01:08) (13 - 22)  SpO2: 94% (12 Jul 2019 01:08) (92% - 97%)  CAPILLARY BLOOD GLUCOSE      POCT Blood Glucose.: 276 mg/dL (11 Jul 2019 21:34)  POCT Blood Glucose.: 222 mg/dL (11 Jul 2019 18:35)  POCT Blood Glucose.: 185 mg/dL (11 Jul 2019 07:17)      PHYSICAL EXAM:  Gen: NAD  LS: Respirations unlabored. CTA b/l  Card: RRR. No m/r/g.   GI: Soft. Nontender. Nondistended. BS+.  Ext: Warm, well perfused      07-11-19 @ 07:01  -  07-12-19 @ 02:50  --------------------------------------------------------  IN:    lactated ringers.: 750 mL  Total IN: 750 mL    OUT:    Bulb: 160 mL    Bulb: 130 mL    Voided: 500 mL  Total OUT: 790 mL    Total NET: -40 mL      LAB VALUES:                             MICROBIOLOGY:    No new microbiology data for review.     RADIOLOGY:        MEDICATIONS  (STANDING):  atorvastatin 5 milliGRAM(s) Oral at bedtime  carvedilol 12.5 milliGRAM(s) Oral every 12 hours  dextrose 5%. 1000 milliLiter(s) (50 mL/Hr) IV Continuous <Continuous>  dextrose 50% Injectable 12.5 Gram(s) IV Push once  dextrose 50% Injectable 25 Gram(s) IV Push once  dextrose 50% Injectable 25 Gram(s) IV Push once  heparin  Injectable 5000 Unit(s) SubCutaneous every 8 hours  insulin lispro (HumaLOG) corrective regimen sliding scale   SubCutaneous three times a day before meals  insulin lispro (HumaLOG) corrective regimen sliding scale   SubCutaneous at bedtime  lactated ringers. 1000 milliLiter(s) (125 mL/Hr) IV Continuous <Continuous>  pantoprazole    Tablet 40 milliGRAM(s) Oral before breakfast    MEDICATIONS  (PRN):  acetaminophen   Tablet .. 650 milliGRAM(s) Oral every 6 hours PRN Mild Pain (1 - 3), Moderate Pain (4 - 6), Severe Pain (7 - 10)  dextrose 40% Gel 15 Gram(s) Oral once PRN Blood Glucose LESS THAN 70 milliGRAM(s)/deciliter  glucagon  Injectable 1 milliGRAM(s) IntraMuscular once PRN Glucose LESS THAN 70 milligrams/deciliter  HYDROmorphone  Injectable 0.5 milliGRAM(s) IV Push every 10 minutes PRN Moderate Pain (4 - 6)  ondansetron Injectable 4 milliGRAM(s) IV Push once PRN Nausea and/or Vomiting  ondansetron Injectable 4 milliGRAM(s) IV Push every 6 hours PRN Nausea and/or Vomiting  oxyCODONE    IR 5 milliGRAM(s) Oral every 4 hours PRN Severe Pain (7 - 10) Team D Surgery Progress Note     SUBJECTIVE / 24H EVENTS  Patient seen and examined on morning rounds. No acute events overnight.    OBJECTIVE:      Vital Signs Last 24 Hrs  T(C): 36.8 (12 Jul 2019 01:08), Max: 36.9 (11 Jul 2019 14:40)  T(F): 98.2 (12 Jul 2019 01:08), Max: 98.4 (11 Jul 2019 14:40)  HR: 76 (12 Jul 2019 01:08) (70 - 98)  BP: 135/65 (12 Jul 2019 01:08) (123/74 - 145/75)  BP(mean): 92 (11 Jul 2019 18:00) (82 - 103)  RR: 20 (12 Jul 2019 01:08) (13 - 22)  SpO2: 94% (12 Jul 2019 01:08) (92% - 97%)      CAPILLARY BLOOD GLUCOSE      POCT Blood Glucose.: 276 mg/dL (11 Jul 2019 21:34)  POCT Blood Glucose.: 222 mg/dL (11 Jul 2019 18:35)  POCT Blood Glucose.: 185 mg/dL (11 Jul 2019 07:17)      PHYSICAL EXAM:  Gen: NAD  LS: Respirations unlabored. CTA b/l  Card: RRR. No m/r/g.   GI: Soft. Nontender. Nondistended. BS+.  Ext: Warm, well perfused      07-11-19 @ 07:01  -  07-12-19 @ 02:50  --------------------------------------------------------  IN:    lactated ringers.: 750 mL  Total IN: 750 mL    OUT:    Bulb: 160 mL    Bulb: 130 mL    Voided: 500 mL  Total OUT: 790 mL    Total NET: -40 mL      LAB VALUES:                             MICROBIOLOGY:    No new microbiology data for review.     RADIOLOGY:        MEDICATIONS  (STANDING):  atorvastatin 5 milliGRAM(s) Oral at bedtime  carvedilol 12.5 milliGRAM(s) Oral every 12 hours  dextrose 5%. 1000 milliLiter(s) (50 mL/Hr) IV Continuous <Continuous>  dextrose 50% Injectable 12.5 Gram(s) IV Push once  dextrose 50% Injectable 25 Gram(s) IV Push once  dextrose 50% Injectable 25 Gram(s) IV Push once  heparin  Injectable 5000 Unit(s) SubCutaneous every 8 hours  insulin lispro (HumaLOG) corrective regimen sliding scale   SubCutaneous three times a day before meals  insulin lispro (HumaLOG) corrective regimen sliding scale   SubCutaneous at bedtime  lactated ringers. 1000 milliLiter(s) (125 mL/Hr) IV Continuous <Continuous>  pantoprazole    Tablet 40 milliGRAM(s) Oral before breakfast    MEDICATIONS  (PRN):  acetaminophen   Tablet .. 650 milliGRAM(s) Oral every 6 hours PRN Mild Pain (1 - 3), Moderate Pain (4 - 6), Severe Pain (7 - 10)  dextrose 40% Gel 15 Gram(s) Oral once PRN Blood Glucose LESS THAN 70 milliGRAM(s)/deciliter  glucagon  Injectable 1 milliGRAM(s) IntraMuscular once PRN Glucose LESS THAN 70 milligrams/deciliter  HYDROmorphone  Injectable 0.5 milliGRAM(s) IV Push every 10 minutes PRN Moderate Pain (4 - 6)  ondansetron Injectable 4 milliGRAM(s) IV Push once PRN Nausea and/or Vomiting  ondansetron Injectable 4 milliGRAM(s) IV Push every 6 hours PRN Nausea and/or Vomiting  oxyCODONE    IR 5 milliGRAM(s) Oral every 4 hours PRN Severe Pain (7 - 10) Team D Surgery Progress Note     SUBJECTIVE / 24H EVENTS  Patient seen and examined on morning rounds. No acute events overnight. She refused to take her insulin for high blood sugars because she was worried about having a reaction. Otherwise She has no complaints    OBJECTIVE:    Vital Signs Last 24 Hrs  T(C): 36.8 (12 Jul 2019 01:08), Max: 36.9 (11 Jul 2019 14:40)  T(F): 98.2 (12 Jul 2019 01:08), Max: 98.4 (11 Jul 2019 14:40)  HR: 76 (12 Jul 2019 01:08) (70 - 98)  BP: 135/65 (12 Jul 2019 01:08) (123/74 - 145/75)  BP(mean): 92 (11 Jul 2019 18:00) (82 - 103)  RR: 20 (12 Jul 2019 01:08) (13 - 22)  SpO2: 94% (12 Jul 2019 01:08) (92% - 97%)      CAPILLARY BLOOD GLUCOSE      POCT Blood Glucose.: 276 mg/dL (11 Jul 2019 21:34)  POCT Blood Glucose.: 222 mg/dL (11 Jul 2019 18:35)  POCT Blood Glucose.: 185 mg/dL (11 Jul 2019 07:17)      PHYSICAL EXAM:  Gen: NAD  LS: Respirations unlabored  Card: RRR  Wound: breast wound dressing w/o strikethrough, DARA drainage serosang      I&O's Detail    11 Jul 2019 07:01  -  12 Jul 2019 04:59  --------------------------------------------------------  IN:    lactated ringers.: 750 mL  Total IN: 750 mL    OUT:    Bulb: 195 mL    Bulb: 150 mL    Voided: 900 mL  Total OUT: 1245 mL    Total NET: -495 mL        LAB VALUES:                             MICROBIOLOGY:    No new microbiology data for review.     RADIOLOGY:    No new images      MEDICATIONS  (STANDING):  atorvastatin 5 milliGRAM(s) Oral at bedtime  carvedilol 12.5 milliGRAM(s) Oral every 12 hours  dextrose 5%. 1000 milliLiter(s) (50 mL/Hr) IV Continuous <Continuous>  dextrose 50% Injectable 12.5 Gram(s) IV Push once  dextrose 50% Injectable 25 Gram(s) IV Push once  dextrose 50% Injectable 25 Gram(s) IV Push once  heparin  Injectable 5000 Unit(s) SubCutaneous every 8 hours  insulin lispro (HumaLOG) corrective regimen sliding scale   SubCutaneous three times a day before meals  insulin lispro (HumaLOG) corrective regimen sliding scale   SubCutaneous at bedtime  lactated ringers. 1000 milliLiter(s) (125 mL/Hr) IV Continuous <Continuous>  pantoprazole    Tablet 40 milliGRAM(s) Oral before breakfast    MEDICATIONS  (PRN):  acetaminophen   Tablet .. 650 milliGRAM(s) Oral every 6 hours PRN Mild Pain (1 - 3), Moderate Pain (4 - 6), Severe Pain (7 - 10)  dextrose 40% Gel 15 Gram(s) Oral once PRN Blood Glucose LESS THAN 70 milliGRAM(s)/deciliter  glucagon  Injectable 1 milliGRAM(s) IntraMuscular once PRN Glucose LESS THAN 70 milligrams/deciliter  HYDROmorphone  Injectable 0.5 milliGRAM(s) IV Push every 10 minutes PRN Moderate Pain (4 - 6)  ondansetron Injectable 4 milliGRAM(s) IV Push once PRN Nausea and/or Vomiting  ondansetron Injectable 4 milliGRAM(s) IV Push every 6 hours PRN Nausea and/or Vomiting  oxyCODONE    IR 5 milliGRAM(s) Oral every 4 hours PRN Severe Pain (7 - 10) Team D Surgery Progress Note     SUBJECTIVE / 24H EVENTS  Patient seen and examined on morning rounds. No acute events overnight. She refused to take her insulin for high blood sugars because she was worried about having a reaction. Otherwise She has no complaints    OBJECTIVE:    Vital Signs Last 24 Hrs  T(C): 36.5 (12 Jul 2019 06:02), Max: 36.9 (11 Jul 2019 14:40)  T(F): 97.7 (12 Jul 2019 06:02), Max: 98.4 (11 Jul 2019 14:40)  HR: 81 (12 Jul 2019 06:02) (70 - 98)  BP: 120/60 (12 Jul 2019 06:02) (120/60 - 145/75)  BP(mean): 92 (11 Jul 2019 18:00) (82 - 103)  RR: 19 (12 Jul 2019 06:02) (13 - 22)  SpO2: 95% (12 Jul 2019 06:02) (92% - 97%)      CAPILLARY BLOOD GLUCOSE      POCT Blood Glucose.: 276 mg/dL (11 Jul 2019 21:34)  POCT Blood Glucose.: 222 mg/dL (11 Jul 2019 18:35)  POCT Blood Glucose.: 185 mg/dL (11 Jul 2019 07:17)      PHYSICAL EXAM:  Gen: NAD  LS: Respirations unlabored  Card: RRR  Wound: breast wound dressing w/o DARA carrillo drainage serosang      I&O's Detail    11 Jul 2019 07:01  -  12 Jul 2019 04:59  --------------------------------------------------------  IN:    lactated ringers.: 750 mL  Total IN: 750 mL    OUT:    Bulb: 195 mL    Bulb: 150 mL    Voided: 900 mL  Total OUT: 1245 mL    Total NET: -495 mL        LAB VALUES:    07-12    136  |  102  |  21  ----------------------------<  245<H>  4.2   |  24  |  1.09    Ca    9.1      12 Jul 2019 05:25                            9.8    10.88 )-----------( 202      ( 12 Jul 2019 05:25 )             29.7            MICROBIOLOGY:    No new microbiology data for review.     RADIOLOGY:    No new images      MEDICATIONS  (STANDING):  atorvastatin 5 milliGRAM(s) Oral at bedtime  carvedilol 12.5 milliGRAM(s) Oral every 12 hours  dextrose 5%. 1000 milliLiter(s) (50 mL/Hr) IV Continuous <Continuous>  dextrose 50% Injectable 12.5 Gram(s) IV Push once  dextrose 50% Injectable 25 Gram(s) IV Push once  dextrose 50% Injectable 25 Gram(s) IV Push once  heparin  Injectable 5000 Unit(s) SubCutaneous every 8 hours  insulin lispro (HumaLOG) corrective regimen sliding scale   SubCutaneous three times a day before meals  insulin lispro (HumaLOG) corrective regimen sliding scale   SubCutaneous at bedtime  lactated ringers. 1000 milliLiter(s) (125 mL/Hr) IV Continuous <Continuous>  pantoprazole    Tablet 40 milliGRAM(s) Oral before breakfast    MEDICATIONS  (PRN):  acetaminophen   Tablet .. 650 milliGRAM(s) Oral every 6 hours PRN Mild Pain (1 - 3), Moderate Pain (4 - 6), Severe Pain (7 - 10)  dextrose 40% Gel 15 Gram(s) Oral once PRN Blood Glucose LESS THAN 70 milliGRAM(s)/deciliter  glucagon  Injectable 1 milliGRAM(s) IntraMuscular once PRN Glucose LESS THAN 70 milligrams/deciliter  HYDROmorphone  Injectable 0.5 milliGRAM(s) IV Push every 10 minutes PRN Moderate Pain (4 - 6)  ondansetron Injectable 4 milliGRAM(s) IV Push once PRN Nausea and/or Vomiting  ondansetron Injectable 4 milliGRAM(s) IV Push every 6 hours PRN Nausea and/or Vomiting  oxyCODONE    IR 5 milliGRAM(s) Oral every 4 hours PRN Severe Pain (7 - 10)

## 2019-07-12 NOTE — DISCHARGE NOTE PROVIDER - NSDCFUADDINST_GEN_ALL_CORE_FT
WOUND CARE:  Please keep incisions clean and dry. Please do not Scrub or rub incisions. Do not use lotion or powder on incisions.   BATHING: You may shower and/or sponge bathe. You may use warm soapy water in the shower and rinse, pat dry.  ACTIVITY: No heavy lifting or straining. Otherwise, you may return to your usual level of physical activity. If you are taking narcotic pain medication DO NOT drive a car, operate machinery or make important decisions.  DIET: Return to your usual diet.  NOTIFY YOUR SURGEON IF YOU HAVE: any bleeding that does not stop, any pus draining from your wound(s), any fever (over 100.4 F) persistent nausea/vomiting, or if your pain is not controlled on your discharge pain medications, unable to urinate.  Please follow up with your primary care physician in one week regarding your hospitalization, bring copies of your discharge paperwork.  Please follow up with your surgeon, Dr. Robertson as an outpatient, please call to schedule appointment

## 2019-07-12 NOTE — DISCHARGE NOTE PROVIDER - NSDCACTIVITY_GEN_ALL_CORE
Walking - Outdoors allowed/Walking - Indoors allowed/No heavy lifting/straining/Stairs allowed/Showering allowed

## 2019-07-12 NOTE — PROGRESS NOTE ADULT - ASSESSMENT
Assessment: Assessment:  72 y/o female presents to [facility] for preoperative evaluation with dx of malignant neoplasm of right female breast. Pt diagnosed with right breast cancer in Dec 2018 and started chemotherapy in Jan. She is now preop for surgery. Scheduled for Bilateral Mastectomy, Right Axillary Kelli  Localization, Bilateral Axillary [place] Lymph Node Biopsy. Pt completed chemotherapy 2 weeks ago. She is recovering well and only issues have been some elevated blood sugars for which she is currently refusing insulin.    Plan:    - Pain control as needed  - DVT ppx  - OOB and ambulating as tolerated  - F/u AM labs  - Regular diet  - Dispo: Likely D/C today Assessment:  72 y/o female presents to [facility] for preoperative evaluation with dx of malignant neoplasm of right female breast. Pt diagnosed with right breast cancer in Dec 2018 and started chemotherapy in Jan. She is now preop for surgery. Scheduled for Bilateral Mastectomy, Right Axillary Kelli  Localization, Bilateral Axillary [place] Lymph Node Biopsy. Pt completed chemotherapy 2 weeks ago. She is recovering well and only issues have been some elevated blood sugars for which she is currently refusing insulin.    Plan:    - Pain control as needed  - DVT ppx  - OOB and ambulating as tolerated  - F/u AM labs  - Regular diet  - DARA teaching   - Dispo: Likely D/C today

## 2019-07-12 NOTE — DISCHARGE NOTE PROVIDER - HOSPITAL COURSE
Patient is a 74 yo female who was admitted to undergo Bilateral Simple Mastectomy. She tolerated the procedure well and was transferred to the floor in stable condition.  Her diet was advanced as tolerated and she was placed on PO pain medication.  On POD #1, she was ambulating well and voiding without difficulty.  She was then found to be stable for discharge to home.  Her pain was well-controlled at the time of discharge.  She will follow-up in the office for drain removal at her post-op visit in 1 week. Patient is a 74 yo female who was admitted to undergo Bilateral Simple Mastectomy. She tolerated the procedure well and was transferred to the floor in stable condition.  Her diet was advanced as tolerated and she was placed on PO pain medication.  On POD #1, she was ambulating well and voiding without difficulty.  She was then found to be stable for discharge to home.  Her pain was well-controlled at the time of discharge.  She will follow-up in the office for drain removal at her post-op visit in 1 week.         ATTENDING STATEMENT:    - I have seen and examined the patient on rounds. Patient's chart, labs, images and reports reviewed.    pain controlled    flaps viable    drains- serosanginous fluid    -Patient evaluated to be stable for discharge. Patient educated with DC instructions, warning signs and symptoms . Pt also instructed to follow up with the surgeon in 1 week in office. Pt expressed verbal understanding.    -Patient instructed to follow up with me in my office 7-10 days after discharge. Follow up instructions provided.    - I have discussed the diagnosis with the patient in detail. Patient expressed verbal understanding and willingness to proceed with proposed plan. All questions answered        Regards    Demetrio Robertson MD    Division of Surgical Oncology    [address] [place] [id number]    Ph:[phone]

## 2019-07-12 NOTE — DISCHARGE NOTE PROVIDER - NSDCCPCAREPLAN_GEN_ALL_CORE_FT
PRINCIPAL DISCHARGE DIAGNOSIS  Diagnosis: Malignant neoplasm of breast  Assessment and Plan of Treatment:       SECONDARY DISCHARGE DIAGNOSES  Diagnosis: Diabetes mellitus  Assessment and Plan of Treatment:     Diagnosis: Hypertension  Assessment and Plan of Treatment:

## 2019-07-17 LAB — SURGICAL PATHOLOGY STUDY: SIGNIFICANT CHANGE UP

## 2019-07-23 ENCOUNTER — APPOINTMENT (OUTPATIENT)
Dept: SURGICAL ONCOLOGY | Facility: CLINIC | Age: 74
End: 2019-07-23
Payer: MEDICARE

## 2019-07-23 VITALS
HEART RATE: 91 BPM | BODY MASS INDEX: 32.99 KG/M2 | WEIGHT: 198 LBS | OXYGEN SATURATION: 94 % | SYSTOLIC BLOOD PRESSURE: 101 MMHG | DIASTOLIC BLOOD PRESSURE: 69 MMHG | HEIGHT: 65 IN

## 2019-07-23 PROCEDURE — 99024 POSTOP FOLLOW-UP VISIT: CPT

## 2019-07-29 NOTE — PHYSICAL EXAM
[Normal] : well developed, well nourished, in no acute distress [de-identified] : Chest wall incisions healing well without signs of infection or hematoma.  Small seroma left outer chest wall.  Anam drain right axilla d/c'd and site beign. covered with 4x4.

## 2019-07-29 NOTE — REASON FOR VISIT
[Post-Op] : a post-op for [FreeTextEntry2] : s/p bilateral total mastectomy, bilateral axillary sentinel lymphadenectomy,  right axillary KETTY  lymphadenectomy on 7/11/19.

## 2019-07-29 NOTE — ASSESSMENT
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  Completed neoadjuvant chemotherapy with a notable decrease in size of mass and lymph node on breast sonogram from 4/18/19.  She is now s/p bilateral total mastectomy, bilateral axillary sentinel lymphadenectomy,  right axillary KELLI  lymphadenectomy on 7/11/19. \par Final path as follows:\par Left breast benign with 2 negative LNs\par Right breast with a 5.1mm invasive moderately differentiated ductal carcinoma with 2 negative SLN. Right axillary Kelli  with 2 LN positive for isolated tumor cells (ITC) on frozen section.\par \par Plan:\par Follow up with Dr. Rodgers for adjuvant care\par No indication to perform completion axillary lymphadenectomy given SLNB no evidence of tumor and ITC in kelli  node. I have discussed the plan with the pt and she agrees to surveil closely.\par RTO in 6 months\par I have discussed the diagnosis, therapeutic plan and options with the patient at length. Patient expressed verbal understanding to proceed with proposed plan. All questions answered.

## 2019-07-29 NOTE — CONSULT LETTER
[Dear  ___] : Dear  [unfilled], [Consult Letter:] : I had the pleasure of evaluating your patient, [unfilled]. [( Thank you for referring [unfilled] for consultation for _____ )] : Thank you for referring [unfilled] for consultation for [unfilled] [Please see my note below.] : Please see my note below. [Consult Closing:] : Thank you very much for allowing me to participate in the care of this patient.  If you have any questions, please do not hesitate to contact me. [Sincerely,] : Sincerely, [FreeTextEntry3] : Demetrio Robertson MD, FICS, FACS\par , Surgical Oncology\par [facility]\par [address]\[place]- [address]\par \par [address]\[place], [place]- [address]\par \par (mob) [phone]\par (o) [phone]\par (f) [phone]\par

## 2019-07-29 NOTE — HISTORY OF PRESENT ILLNESS
[de-identified] : 73 Y F presents for her initial post op evaluation.  \par She initially presented on 1/15/19 with a complaint of palpable breast mass R breast since Dec 2018, underwent mammogram followed by CNB of R breast mass and R axillary lymph node suggestive of invasive ductal carcinoma with metastatic lymphadenopathy.\par No prior h/o breast biopsy or ca .\par No family h/o breast ca\par I placed her mediport on 1/25/19 and she completed neoadjuvant chemotherapy under the guidance of Carla Rodgers. \par She underwent a repeat bilateral breast sonogram on 4/18/19 which showed a smaller right breast mass and right axillary axillary lymph node.  A stable mass was noted in the right breast possibly a satellite lesion (Birads 6).\par She is now s/p bilateral total mastectomy, bilateral axillary sentinel lymphadenectomy,  right axillary KELLI  lymphadenectomy on 7/11/19. \par Final path as follows:\par Left breast benign with 2 negative LNs\par Right breast with a 5.1mm invasive moderately differentiated ductal carcinoma with 2 negative SLN. Right axillary Kelli  with 2 LN positive for isolated tumor cells (ITC) on frozen section.\par \par Today she is feeling generally well with only minor chest wall discomfort.  Denies post op fevers.  She states that her left axillary drain was dislodged days ago and she continues to have 20cc/day over the last 48 hours from the right axillary drain of serosanguineous drainage.

## 2019-08-02 ENCOUNTER — OUTPATIENT (OUTPATIENT)
Dept: OUTPATIENT SERVICES | Facility: HOSPITAL | Age: 74
LOS: 1 days | Discharge: HOME | End: 2019-08-02

## 2019-08-02 DIAGNOSIS — Z98.891 HISTORY OF UTERINE SCAR FROM PREVIOUS SURGERY: Chronic | ICD-10-CM

## 2019-08-02 DIAGNOSIS — Z92.21 PERSONAL HISTORY OF ANTINEOPLASTIC CHEMOTHERAPY: Chronic | ICD-10-CM

## 2019-08-02 DIAGNOSIS — Z90.711 ACQUIRED ABSENCE OF UTERUS WITH REMAINING CERVICAL STUMP: Chronic | ICD-10-CM

## 2019-08-02 DIAGNOSIS — C50.919 MALIGNANT NEOPLASM OF UNSPECIFIED SITE OF UNSPECIFIED FEMALE BREAST: ICD-10-CM

## 2019-08-02 DIAGNOSIS — Z90.5 ACQUIRED ABSENCE OF KIDNEY: Chronic | ICD-10-CM

## 2019-08-06 ENCOUNTER — APPOINTMENT (OUTPATIENT)
Dept: HEMATOLOGY ONCOLOGY | Facility: CLINIC | Age: 74
End: 2019-08-06
Payer: MEDICARE

## 2019-08-06 ENCOUNTER — OTHER (OUTPATIENT)
Age: 74
End: 2019-08-06

## 2019-08-06 ENCOUNTER — RESULT REVIEW (OUTPATIENT)
Age: 74
End: 2019-08-06

## 2019-08-06 VITALS
BODY MASS INDEX: 30.79 KG/M2 | DIASTOLIC BLOOD PRESSURE: 80 MMHG | HEART RATE: 82 BPM | SYSTOLIC BLOOD PRESSURE: 130 MMHG | TEMPERATURE: 98.5 F | OXYGEN SATURATION: 96 % | WEIGHT: 185 LBS | RESPIRATION RATE: 18 BRPM

## 2019-08-06 LAB
BASOPHILS # BLD AUTO: 0 K/UL — SIGNIFICANT CHANGE UP (ref 0–0.2)
BASOPHILS NFR BLD AUTO: 0.2 % — SIGNIFICANT CHANGE UP (ref 0–2)
EOSINOPHIL # BLD AUTO: 0.2 K/UL — SIGNIFICANT CHANGE UP (ref 0–0.5)
EOSINOPHIL NFR BLD AUTO: 4.2 % — SIGNIFICANT CHANGE UP (ref 0–6)
HCT VFR BLD CALC: 29.3 % — LOW (ref 34.5–45)
HGB BLD-MCNC: 10.4 G/DL — LOW (ref 11.5–15.5)
LYMPHOCYTES # BLD AUTO: 0.8 K/UL — LOW (ref 1–3.3)
LYMPHOCYTES # BLD AUTO: 17.4 % — SIGNIFICANT CHANGE UP (ref 13–44)
MCHC RBC-ENTMCNC: 33.3 PG — SIGNIFICANT CHANGE UP (ref 27–34)
MCHC RBC-ENTMCNC: 35.3 G/DL — SIGNIFICANT CHANGE UP (ref 32–36)
MCV RBC AUTO: 94.1 FL — SIGNIFICANT CHANGE UP (ref 80–100)
MONOCYTES # BLD AUTO: 0.4 K/UL — SIGNIFICANT CHANGE UP (ref 0–0.9)
MONOCYTES NFR BLD AUTO: 7.9 % — SIGNIFICANT CHANGE UP (ref 2–14)
NEUTROPHILS # BLD AUTO: 3.4 K/UL — SIGNIFICANT CHANGE UP (ref 1.8–7.4)
NEUTROPHILS NFR BLD AUTO: 70.3 % — SIGNIFICANT CHANGE UP (ref 43–77)
PLATELET # BLD AUTO: 246 K/UL — SIGNIFICANT CHANGE UP (ref 150–400)
RBC # BLD: 3.11 M/UL — LOW (ref 3.8–5.2)
RBC # FLD: 12.8 % — SIGNIFICANT CHANGE UP (ref 10.3–14.5)
WBC # BLD: 4.8 K/UL — SIGNIFICANT CHANGE UP (ref 3.8–10.5)
WBC # FLD AUTO: 4.8 K/UL — SIGNIFICANT CHANGE UP (ref 3.8–10.5)

## 2019-08-06 PROCEDURE — 99215 OFFICE O/P EST HI 40 MIN: CPT

## 2019-08-06 RX ORDER — HYDROCORTISONE 25 MG/G
2.5 OINTMENT TOPICAL TWICE DAILY
Qty: 1 | Refills: 3 | Status: DISCONTINUED | COMMUNITY
Start: 2019-06-26 | End: 2019-08-06

## 2019-08-06 RX ORDER — MAGNESIUM OXIDE 241.3 MG/1000MG
400 TABLET ORAL DAILY
Qty: 30 | Refills: 1 | Status: DISCONTINUED | COMMUNITY
Start: 2019-06-24 | End: 2019-08-06

## 2019-08-06 RX ORDER — B-COMPLEX WITH VITAMIN C
TABLET ORAL DAILY
Refills: 0 | Status: DISCONTINUED | COMMUNITY
Start: 2019-05-29 | End: 2019-08-06

## 2019-08-06 RX ORDER — CIMETIDINE 800 MG
TABLET ORAL
Refills: 0 | Status: DISCONTINUED | COMMUNITY
End: 2019-08-06

## 2019-08-06 RX ORDER — BETAMETHASONE DIPROPIONATE 0.5 MG/G
0.05 CREAM TOPICAL TWICE DAILY
Qty: 1 | Refills: 5 | Status: DISCONTINUED | COMMUNITY
Start: 2019-04-10 | End: 2019-08-06

## 2019-08-06 RX ORDER — ALBUTEROL SULFATE 90 UG/1
108 (90 BASE) AEROSOL, METERED RESPIRATORY (INHALATION)
Qty: 1 | Refills: 0 | Status: DISCONTINUED | COMMUNITY
Start: 2019-04-17 | End: 2019-08-06

## 2019-08-07 LAB
ALBUMIN SERPL ELPH-MCNC: 4 G/DL
ALP BLD-CCNC: 58 U/L
ALT SERPL-CCNC: 12 U/L
ANION GAP SERPL CALC-SCNC: 14 MMOL/L
AST SERPL-CCNC: 12 U/L
BILIRUB SERPL-MCNC: 0.2 MG/DL
BUN SERPL-MCNC: 23 MG/DL
CALCIUM SERPL-MCNC: 9.5 MG/DL
CHLORIDE SERPL-SCNC: 102 MMOL/L
CO2 SERPL-SCNC: 24 MMOL/L
CREAT SERPL-MCNC: 1.12 MG/DL
GLUCOSE SERPL-MCNC: 174 MG/DL
MAGNESIUM SERPL-MCNC: 1.3 MG/DL
POTASSIUM SERPL-SCNC: 3.9 MMOL/L
PROT SERPL-MCNC: 6 G/DL
SODIUM SERPL-SCNC: 140 MMOL/L

## 2019-08-07 NOTE — HISTORY OF PRESENT ILLNESS
[Disease: _____________________] : Disease: [unfilled] [T: ___] : T[unfilled] [N: ___] : N[unfilled] [AJCC Stage: ____] : AJCC Stage: [unfilled] [Treatment Protocol] : Treatment Protocol [de-identified] : ER-NE-HER2- [de-identified] : 72yo woman presenting for medical oncology follow up.\par \par Last mammogram . She has no history of abnormal breast imaging, breast biopsies, or surgeries.\par \par She saw her PMD who sent her for a screening mammogram after she palpated a mass on the R side of her chest 2018. She had been having body aches/soreness and feeling general malaise since November, despite a course of antibiotics (just after flu shot administered). Also some R shoulder/arm pains.\par \par 18 Mammogram screenin.7cm irregular spiculated mass upper outer right breast 8cm FN with associated architectural distortion and several associated microcalcifications. Partially included enlarged right axillary lymph nodes. BIRADS 0\par \par 18 bilateral breast US: R breast 10:00 7cm FN 2.6cm irregular hypoechoic mass (biopsy recommended). Inferior right axillary lymph node with focal area of eccentric cortical thickening (recommend US guided biopsy). 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 Michael 7/9. 1.4cm involved, DCIS cribiform pattern with high grade nuclear atypia and apocrine cytology very focally present. (coil shaped clip) R axillary lymph node: metastatic ductal carcinoma with apocrine cytology involving a lymph node (hydromark marking clip) ER 0% NV 0% HER2 IHC 0 negative\par \par She saw Dr. Demetrio Robertson yesterday who recommended medical oncology consultation for neoadjuvant chemotherapy. \par \par She noted on intial visit with me some occasional body aches still and fatigue over the last month. She notes an intermittent tongue sensation - like a scald from food (though she does not recall an episode of this). Symptoms intermittent for months. Otherwise she feels well. She is active at baseline, uses stationary bike at home most days for exercise. \par \par 19 PET/CT: Superolateral R breast and R axillary lymph nodes FDG-avid. S/p left nephrectomy. Few subcm b/l pulmonary nodules are nonspecific. Please correlate clinically for infection and follow up with CT chest in 1 month.\par \par TTE 19: EF 64%, GLS -20 Wall motion abnormality in inferolateral wall reported, EKG stable. Patient seen by Dr. Lorrie Yadav (Cardiology) - TTE reviewed without significant wall motion abnormalities, antihypertensives changed \par \par MRI Brain w/wo contrast 19: L maxillary polyp v. retention cyst. Non enhancing area of abnormal T2 prolongation involving left thalamic region, nonspecific. Stable on repeat 2019.\par \par CT Chest 19: No interval changes since 19. Stable 3.3x2.1cm UOQ R breast, 2x1.2cm R axillary lymph node. Stable 4mm and smaller scattered indeterminant pulmonary nodules.\par \par She started neoadjuvant chemotherapy with AC on 19. Taxol #1 3/27.\par Taxol #2 with grade 3 infusion reaction, vasculitic rash development. Taxol reattempt with similar. Taxotere attempt  with chest discomfort, acute worsening of rash/itching, severe back pain radiating into the chest, a few minutes into infusion. Breast US performed with response to therapy noted.\par Also c/b hyperglycemia (A1C 8.0) - initiated home glucose monitoring and PMD follow up.\par \par CMF #1 with neulasta onpro started  without event; 4 cycles completed.\par \par b/l mastectomy 19 (Dr. Robertson): R breast: IDC, mod diff, 5.1mm (residual disease ranging in size from 1-2mm in tumor bed, approx 10-20% viable cells in 1.9x1.5x1.0cm tumor bed post-neoadjuvant treatment), DCIS grade 2-3 (4mm, 5/14 blocks), 2/4 sentinel nodes with ITCs on permanent section only. Margins negative\par [id number](i+)\par ER-NV-HER2 IHC 0 negative, PDL <1%.\par \par Pertinent History:\par No family history of breast or ovarian cancer.\par HTN - saw Cardiologist 2018, stress test normal per patient, Dr. Samaniego [facility], sees him annually. BP well controlled generally, now following with Dr. Yadav Oncocardiology\par HLD \par Single kidney (kidney donor to brother in ) - baseline Cr 1.2-1.3 though patient notes no prior renal issues\par Transaminitis AST 43, ALT 54 (18), no prior history, resolved since beginning treatment\par BCC of skin removed\par Hysterectomy for fibroid uterus\par Cscope last ~5 years ago, no polyps per patient\par PMD Lance Lafleur\par Single, lives with her son Richard. She has a daughter as well who works at [employer]. She is a retired RN.  [FreeTextEntry1] : Neoadjuvant Adriamycin 60mg/m2 + Cytoxan 600mg/m2 every 14 days x 4 cycles with Neulasta OnPro support (Day 1), taxol 80mg/m2 x 1, followed by CMF x 4 [de-identified] : Patient presents today for follow up. Post operatively she notes some lumpiness to the skin at the scar site with dog earring and some numbness to R side of the chest. Her tongue and taste are better and her skin is back to baseline. No other complaints - she feels very well.

## 2019-08-07 NOTE — ASSESSMENT
[Curative] : Goals of care discussed with patient: Curative [FreeTextEntry1] : 74yo woman with history of HTN on multiple medications, kidney donor/CKD, ER-AR-HER2-, lymph-node positive right breast cancer anatomic stage IIB, AJCC 8th edition clinical prognostic stage IIIB, on neoadjuvant chemotherapy (dose-dense AC-->T) presenting for follow up. Course c/b neuropathy and a new rash after taxol, infusion reactions to both taxol and taxotere very quickly into infusion initiation. CT chest for cough 4/2019 with stable 3mm pulm nodules, decreased size of breast mass and axillary LN; US 4/17 with decrease in size of breast mass and axillary LN. She completed 4 cycles of CMF with OnPro q2 weeks. 7/11/19 b/l mastectomy with residual disease, negative margins (T1bN0(i+)Mx). \par \par I presented her case at the multidisciplinary breast tumor board; consensus for adjuvant radiation to breast/axilla and adjuvant xeloda per the CREATE-X study (which showed a significant benefit for adjuvant xeloda in TNBC treated with neoadjuvant chemotherapy with residual disease at the time of definitive surgery). We discussed risks/benefits and adverse effects of xeloda including but not limited to Hand-Foot-Syndrome (recommended aggressive moisturization of skin with either aquaphor or udder cream twice daily), increased risk of cytopenias and thus increased risk for infections which can be severe and life- threatening, diarrhea (for which anti-diarrheals can be used/prescribed in most-instances), hair thinning, and fatigue among others. Management of some adverse reactions may require temporary dose interruption/delay or permanent discontinuation. Patient expressed understanding of the risks and benefits of the treatment plan, had opportunity to ask questions, and oral and written consent was obtained. She was given written information regarding the treatment plan and potential side effects for review.\par \par Breast Cancer: neuropathy resolved, tolerated CMF without infusion reactions, s/p mastectomy as above\par -referral for adjuvant radiation Dr. Carrasco to be scheduled for next week\par -xeloda to follow adjuvant radiation treatment completion ASAP (with close attention to GFR, if GFR consistently 30-50 may require dose reduction - 75% of 1250mg/m2 PO BID day 1-14 of 21 day cycle for 6-8 cycles per CREATE-X study NEJM)\par -check CBC/CMP today\par -follow up patient's surgeon Dr. Robertson\par -patient's best contact home number: [phone], [phone]\par -follow up with me 2-3 weeks after radiation plan made\par \par Abdominal nodule: nonspecific, superficial. patient had similar nodule in groin recently that resolved (related to hair follicle v. LN v. prior skin rash)\par -US soft tissue c/w small lipoma\par -continue to monitor, advised patient if any change in size/shape to report/evaluate ASAP\par \par Hyperglycemia: in the setting of steroids, likely underlying insulin resistance/DMII undiagnosed. steroids now discontinued (though some given with each CMF dose), FS 100s consistently now\par -continue metformin\par -hydration encouraged, continue to monitor\par -patient will continue to check FS glucose at home, call (downtrending <300)\par -follow up PMD\par \par MRI Brain findings: stable, nonspecific thalamic enhancement on repeat brain MRI\par -reviewed with Dr. Jacobs - continue treatment/breast cancer management, follow up MRI Brain Sept and follow up with him at that time\par \par Rash: likely taxane related, fully resolved without recurrence\par -dermatology follow up\par -continue to monitor\par \par Anemia: Hgb 8, normocytic, suspect AC related, improved to >10 now\par -continue to monitor, asymptomatic\par \par HTN: \par -follow up Dr. Yadav with repeat TTE planned in near future\par -continue new antihypertensives, coreg, and home BP monitoring per her recommendations (BP well controlled currently)\par

## 2019-08-07 NOTE — PHYSICAL EXAM
[Fully active, able to carry on all pre-disease performance without restriction] : Status 0 - Fully active, able to carry on all pre-disease performance without restriction [Normal] : normal appearance, no rash, nodules, vesicles, ulcers, erythema [de-identified] : hairs growing back [de-identified] : no masses or adenopathy palpable b/l, excess skin noted at b/l mastectomy sites with lateral dog earring and scar tissue [de-identified] : ~1cm round nodule superficial/subcutaneous mid abdomen, nontender, unchanged

## 2019-08-12 ENCOUNTER — OUTPATIENT (OUTPATIENT)
Dept: OUTPATIENT SERVICES | Facility: HOSPITAL | Age: 74
LOS: 1 days | Discharge: HOME | End: 2019-08-12
Payer: MEDICARE

## 2019-08-12 DIAGNOSIS — Z92.21 PERSONAL HISTORY OF ANTINEOPLASTIC CHEMOTHERAPY: Chronic | ICD-10-CM

## 2019-08-12 DIAGNOSIS — Z90.5 ACQUIRED ABSENCE OF KIDNEY: Chronic | ICD-10-CM

## 2019-08-12 DIAGNOSIS — Z98.891 HISTORY OF UTERINE SCAR FROM PREVIOUS SURGERY: Chronic | ICD-10-CM

## 2019-08-12 DIAGNOSIS — Z90.711 ACQUIRED ABSENCE OF UTERUS WITH REMAINING CERVICAL STUMP: Chronic | ICD-10-CM

## 2019-08-13 ENCOUNTER — APPOINTMENT (OUTPATIENT)
Dept: RADIATION ONCOLOGY | Facility: CLINIC | Age: 74
End: 2019-08-13

## 2019-08-13 VITALS
SYSTOLIC BLOOD PRESSURE: 146 MMHG | HEART RATE: 84 BPM | RESPIRATION RATE: 18 BRPM | OXYGEN SATURATION: 97 % | TEMPERATURE: 98.45 F | WEIGHT: 180 LBS | BODY MASS INDEX: 29.99 KG/M2 | HEIGHT: 65 IN | DIASTOLIC BLOOD PRESSURE: 86 MMHG

## 2019-08-13 DIAGNOSIS — Z80.8 FAMILY HISTORY OF MALIGNANT NEOPLASM OF OTHER ORGANS OR SYSTEMS: ICD-10-CM

## 2019-08-13 PROCEDURE — 77263 THER RADIOLOGY TX PLNG CPLX: CPT

## 2019-08-15 PROCEDURE — 77333 RADIATION TREATMENT AID(S): CPT | Mod: 26

## 2019-08-15 PROCEDURE — 77290 THER RAD SIMULAJ FIELD CPLX: CPT | Mod: 26

## 2019-08-18 NOTE — PHYSICAL EXAM
[Normal] : no focal deficits [No UE Edema] : there is no upper extremity edema [de-identified] : well-healed mastectomy scars bilaterally with foci of numbness

## 2019-08-18 NOTE — DISEASE MANAGEMENT
[Clinical] : TNM Stage: c [IIB] : IIB [FreeTextEntry4] : [id number](i+)M0 [NTNM] : 1 [TTNM] : 2 [MTNM] : 0

## 2019-08-18 NOTE — OB/GYN HISTORY
[Definite:  ___ (Date)] : the last menstrual period was [unfilled] [___] : Living: [unfilled] [Spotting Between  Menses] : no spotting between menses [History of Birth Control Pills] : Patient has no history of taking birth control pills

## 2019-08-18 NOTE — VITALS
[Maximal Pain Intensity: 3/10] : 3/10 [Least Pain Intensity: 0/10] : 0/10 [Pain Description/Quality: ___] : Pain description/quality: [unfilled] [Pain Duration: ___] : Pain duration: [unfilled] [Pain Location: ___] : Pain Location: [unfilled] [OTC] : OTC [90: Able to carry normal activity; minor signs or symptoms of disease.] : 90: Able to carry normal activity; minor signs or symptoms of disease.  [ECOG Performance Status: 0 - Fully active, able to carry on all pre-disease performance without restriction] : Performance Status: 0 - Fully active, able to carry on all pre-disease performance without restriction [Date: ____________] : Patient's last distress assessment performed on [unfilled]. [3 - Distress Level] : Distress Level: 3 [Pain Interferes with ADLs] : Pain does not interfere with activities of daily living

## 2019-08-18 NOTE — HISTORY OF PRESENT ILLNESS
[FreeTextEntry1] : Ms. Moran is a 73 year old woman who developed a palpable right breast mass in December 2018. Screening mammography 12/22/18 identified a 2.7cm irregular spiculated R UOQ mass 8cmfn and enlarged right axillary lymph nodes. Bilateral sonography 12/27/18 re-identified the sentinel lesion as a 2.6cm mass 10:00 7cmfn, two likely satellite lesions at the 9-10:00 axis, and an inferior right axillary node with cortical thickening. \par \par US-guided core biopsy 1/3/19 at 10:00 7cmfn of the right breast demonstrated moderately differentiated IDC, [place] 7/9, ER-/CT-/Her2-, with a high grade DCIS component; concurrent axillary lymph node biopsy found a metastatic hilda deposit of the same histology. \par \par She then underwent neoadjuvant chemotherapy beginning with AC on 1/28/19. Taxol was initiated 3/27 and the patient experienced significant transfusion reactions, subsequent to which she was switched to Taxotere. Taxotere unfortunately produced severe pain and rash, which was treated with steroids which in turn induced hyperglycemia and an A1c of 8. She then underwent 4 cycles of CMF which she tolerated well. \par \par She underwent bilateral mastectomy and axillary lymph node exploration on 7/11/19, which found residual IDC in the right breast ranging from 1-2mm in the tumor bed, with the largest measuring 5.1mm.  Two out of four right sentinel lymph nodes contained ITCs. Margins were widely negative. Histology remained triple negative with negative PDL. Left mastectomy and sentinel lymph nodes were benign. She now presents for consideration of adjuvant post-mastectomy radiotherapy.

## 2019-08-21 PROCEDURE — 77334 RADIATION TREATMENT AID(S): CPT | Mod: 26

## 2019-08-21 PROCEDURE — 77300 RADIATION THERAPY DOSE PLAN: CPT | Mod: 26

## 2019-08-21 PROCEDURE — 77295 3-D RADIOTHERAPY PLAN: CPT | Mod: 26

## 2019-08-27 ENCOUNTER — RESULT REVIEW (OUTPATIENT)
Age: 74
End: 2019-08-27

## 2019-08-27 ENCOUNTER — APPOINTMENT (OUTPATIENT)
Dept: HEMATOLOGY ONCOLOGY | Facility: CLINIC | Age: 74
End: 2019-08-27
Payer: MEDICARE

## 2019-08-27 VITALS
HEART RATE: 76 BPM | SYSTOLIC BLOOD PRESSURE: 162 MMHG | OXYGEN SATURATION: 96 % | TEMPERATURE: 98.6 F | DIASTOLIC BLOOD PRESSURE: 88 MMHG | RESPIRATION RATE: 16 BRPM | BODY MASS INDEX: 29.95 KG/M2 | WEIGHT: 179.99 LBS

## 2019-08-27 LAB
ALBUMIN SERPL ELPH-MCNC: 4.1 G/DL
ALP BLD-CCNC: 52 U/L
ALT SERPL-CCNC: 19 U/L
ANION GAP SERPL CALC-SCNC: 14 MMOL/L
AST SERPL-CCNC: 16 U/L
BASOPHILS # BLD AUTO: 0 K/UL — SIGNIFICANT CHANGE UP (ref 0–0.2)
BASOPHILS NFR BLD AUTO: 0.1 % — SIGNIFICANT CHANGE UP (ref 0–2)
BILIRUB SERPL-MCNC: 0.3 MG/DL
BUN SERPL-MCNC: 19 MG/DL
CALCIUM SERPL-MCNC: 9.6 MG/DL
CHLORIDE SERPL-SCNC: 104 MMOL/L
CO2 SERPL-SCNC: 25 MMOL/L
CREAT SERPL-MCNC: 1.07 MG/DL
EOSINOPHIL # BLD AUTO: 0.2 K/UL — SIGNIFICANT CHANGE UP (ref 0–0.5)
EOSINOPHIL NFR BLD AUTO: 3.3 % — SIGNIFICANT CHANGE UP (ref 0–6)
GLUCOSE SERPL-MCNC: 153 MG/DL
HCT VFR BLD CALC: 32.2 % — LOW (ref 34.5–45)
HGB BLD-MCNC: 10.9 G/DL — LOW (ref 11.5–15.5)
LYMPHOCYTES # BLD AUTO: 1.1 K/UL — SIGNIFICANT CHANGE UP (ref 1–3.3)
LYMPHOCYTES # BLD AUTO: 20 % — SIGNIFICANT CHANGE UP (ref 13–44)
MAGNESIUM SERPL-MCNC: 1.5 MG/DL
MCHC RBC-ENTMCNC: 31.8 PG — SIGNIFICANT CHANGE UP (ref 27–34)
MCHC RBC-ENTMCNC: 34 G/DL — SIGNIFICANT CHANGE UP (ref 32–36)
MCV RBC AUTO: 93.3 FL — SIGNIFICANT CHANGE UP (ref 80–100)
MONOCYTES # BLD AUTO: 0.5 K/UL — SIGNIFICANT CHANGE UP (ref 0–0.9)
MONOCYTES NFR BLD AUTO: 9 % — SIGNIFICANT CHANGE UP (ref 2–14)
NEUTROPHILS # BLD AUTO: 3.8 K/UL — SIGNIFICANT CHANGE UP (ref 1.8–7.4)
NEUTROPHILS NFR BLD AUTO: 67.7 % — SIGNIFICANT CHANGE UP (ref 43–77)
PLATELET # BLD AUTO: 200 K/UL — SIGNIFICANT CHANGE UP (ref 150–400)
POTASSIUM SERPL-SCNC: 3.9 MMOL/L
PROT SERPL-MCNC: 6 G/DL
RBC # BLD: 3.45 M/UL — LOW (ref 3.8–5.2)
RBC # FLD: 13.2 % — SIGNIFICANT CHANGE UP (ref 10.3–14.5)
SODIUM SERPL-SCNC: 143 MMOL/L
WBC # BLD: 5.6 K/UL — SIGNIFICANT CHANGE UP (ref 3.8–10.5)
WBC # FLD AUTO: 5.6 K/UL — SIGNIFICANT CHANGE UP (ref 3.8–10.5)

## 2019-08-27 PROCEDURE — 77280 THER RAD SIMULAJ FIELD SMPL: CPT | Mod: 26

## 2019-08-27 PROCEDURE — 99215 OFFICE O/P EST HI 40 MIN: CPT

## 2019-08-27 NOTE — ASSESSMENT
[FreeTextEntry1] : 74yo woman with history of HTN on multiple medications, kidney donor/CKD, ER-MD-HER2-, lymph-node positive right breast cancer anatomic stage IIB, AJCC 8th edition clinical prognostic stage IIIB, on neoadjuvant chemotherapy (dose-dense AC-->T) presenting for follow up. Course c/b neuropathy and a new rash after taxol, infusion reactions to both taxol and taxotere very quickly into infusion initiation. CT chest for cough 4/2019 with stable 3mm pulm nodules, decreased size of breast mass and axillary LN; US 4/17 with decrease in size of breast mass and axillary LN. She completed 4 cycles of CMF with OnPro q2 weeks. 7/11/19 b/l mastectomy with residual disease, negative margins (T1bN0(i+)Mx). \par \par I presented her case at the multidisciplinary breast tumor board; consensus for adjuvant radiation to breast/axilla and adjuvant xeloda per the CREATE-X study (which showed a significant benefit for adjuvant xeloda in TNBC treated with neoadjuvant chemotherapy with residual disease at the time of definitive surgery). We discussed risks/benefits and adverse effects of xeloda including but not limited to Hand-Foot-Syndrome (recommended aggressive moisturization of skin with either aquaphor or udder cream twice daily), increased risk of cytopenias and thus increased risk for infections which can be severe and life- threatening, diarrhea (for which anti-diarrheals can be used/prescribed in most-instances), hair thinning, and fatigue among others. Management of some adverse reactions may require temporary dose interruption/delay or permanent discontinuation. Patient expressed understanding of the risks and benefits of the treatment plan, had opportunity to ask questions, and oral and written consent was obtained previously. She was given written information regarding the treatment plan and potential side effects for review at that time.\par \par Breast Cancer: neuropathy resolved, tolerated CMF without infusion reactions, s/p mastectomy as above\par -adjuvant radiation now, planned 6 weeks - follow up with me as radiation treatment completed\par -xeloda to follow adjuvant radiation treatment completion ASAP tentative plan for mid-Oct (with close attention to GFR, if GFR consistently 30-50 may require dose reduction - 75% of 1250mg/m2 PO BID day 1-14 of 21 day cycle for 6-8 cycles per CREATE-X study NEJM)\par -check CBC/CMP today, port flushes q6 weeks to be scheduled\par -follow up patient's surgeon Dr. Robertson as recommended\par -patient's best contact home number: [phone], [phone]\par \par Abdominal nodule: nonspecific, superficial. patient had similar nodule in groin recently that resolved (related to hair follicle v. LN v. prior skin rash) - unchanged\par -US soft tissue c/w small lipoma\par -continue to monitor, advised patient if any change in size/shape to report/evaluate ASAP\par \par Hyperglycemia: in the setting of steroids, likely underlying insulin resistance/DMII undiagnosed. steroids now discontinued (though some given with each CMF dose), FS 100s-150s consistently now\par -continue metformin\par -hydration encouraged, continue to monitor\par -patient will continue to check FS glucose at home, call (downtrending <300)\par -follow up PMD, encouraged today\par \par MRI Brain findings: stable, nonspecific thalamic enhancement on repeat brain MRI\par -reviewed with Dr. Jacobs - continue treatment/breast cancer management, follow up MRI Brain Sept and follow up with him at that time scheduled, discussed today\par \par Rash: likely taxane related, fully resolved without recurrence\par -dermatology follow up\par -continue to monitor\par \par Anemia: Hgb 8, normocytic, suspect AC related, improved to >10 now\par -continue to monitor as recvers, asymptomatic\par \par HTN: \par -follow up Dr. Yadav with repeat TTE planned in near future - 8/29 scheduled\par -continue new antihypertensives, coreg, and home BP monitoring per her recommendations (BP well controlled currently)\par  [Curative] : Goals of care discussed with patient: Curative

## 2019-08-27 NOTE — HISTORY OF PRESENT ILLNESS
[Disease: _____________________] : Disease: [unfilled] [T: ___] : T[unfilled] [AJCC Stage: ____] : AJCC Stage: [unfilled] [N: ___] : N[unfilled] [de-identified] : 72yo woman presenting for medical oncology follow up.\par \par Last mammogram . She has no history of abnormal breast imaging, breast biopsies, or surgeries.\par \par She saw her PMD who sent her for a screening mammogram after she palpated a mass on the R side of her chest 2018. She had been having body aches/soreness and feeling general malaise since November, despite a course of antibiotics (just after flu shot administered). Also some R shoulder/arm pains.\par \par 18 Mammogram screenin.7cm irregular spiculated mass upper outer right breast 8cm FN with associated architectural distortion and several associated microcalcifications. Partially included enlarged right axillary lymph nodes. BIRADS 0\par \par 18 bilateral breast US: R breast 10:00 7cm FN 2.6cm irregular hypoechoic mass (biopsy recommended). Inferior right axillary lymph node with focal area of eccentric cortical thickening (recommend US guided biopsy). 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 Michael 7/9. 1.4cm involved, DCIS cribiform pattern with high grade nuclear atypia and apocrine cytology very focally present. (coil shaped clip) R axillary lymph node: metastatic ductal carcinoma with apocrine cytology involving a lymph node (hydromark marking clip) ER 0% ID 0% HER2 IHC 0 negative\par \par She saw Dr. Demetrio Robertson yesterday who recommended medical oncology consultation for neoadjuvant chemotherapy. \par \par She noted on intial visit with me some occasional body aches still and fatigue over the last month. She notes an intermittent tongue sensation - like a scald from food (though she does not recall an episode of this). Symptoms intermittent for months. Otherwise she feels well. She is active at baseline, uses stationary bike at home most days for exercise. \par \par 19 PET/CT: Superolateral R breast and R axillary lymph nodes FDG-avid. S/p left nephrectomy. Few subcm b/l pulmonary nodules are nonspecific. Please correlate clinically for infection and follow up with CT chest in 1 month.\par \par TTE 19: EF 64%, GLS -20 Wall motion abnormality in inferolateral wall reported, EKG stable. Patient seen by Dr. Lorrie Yadav (Cardiology) - TTE reviewed without significant wall motion abnormalities, antihypertensives changed \par \par MRI Brain w/wo contrast 19: L maxillary polyp v. retention cyst. Non enhancing area of abnormal T2 prolongation involving left thalamic region, nonspecific. Stable on repeat 2019.\par \par CT Chest 19: No interval changes since 19. Stable 3.3x2.1cm UOQ R breast, 2x1.2cm R axillary lymph node. Stable 4mm and smaller scattered indeterminant pulmonary nodules.\par \par She started neoadjuvant chemotherapy with AC on 19. Taxol #1 3/27.\par Taxol #2 with grade 3 infusion reaction, vasculitic rash development. Taxol reattempt with similar. Taxotere attempt  with chest discomfort, acute worsening of rash/itching, severe back pain radiating into the chest, a few minutes into infusion. Breast US performed with response to therapy noted.\par Also c/b hyperglycemia (A1C 8.0) - initiated home glucose monitoring and PMD follow up.\par \par CMF #1 with neulasta onpro started  without event; 4 cycles completed.\par \par b/l mastectomy 19 (Dr. Robertson): R breast: IDC, mod diff, 5.1mm (residual disease ranging in size from 1-2mm in tumor bed, approx 10-20% viable cells in 1.9x1.5x1.0cm tumor bed post-neoadjuvant treatment), DCIS grade 2-3 (4mm, 5/14 blocks), 2/4 sentinel nodes with ITCs on permanent section only. Margins negative\par [id number](i+)\par ER-ID-HER2 IHC 0 negative, PDL <1%.\par \par Pertinent History:\par No family history of breast or ovarian cancer.\par HTN - saw Cardiologist 2018, stress test normal per patient, Dr. Samaniego [facility], sees him annually. BP well controlled generally, now following with Dr. Yadav Oncocardiology\par HLD \par Single kidney (kidney donor to brother in ) - baseline Cr 1.2-1.3 though patient notes no prior renal issues\par Transaminitis AST 43, ALT 54 (18), no prior history, resolved since beginning treatment\par BCC of skin removed\par Hysterectomy for fibroid uterus\par Cscope last ~5 years ago, no polyps per patient\par PMD Lance Lafleur\par Single, lives with her son Richard. She has a daughter as well who works at [employer]. She is a retired RN.  [de-identified] : ER-DC-HER2- [Treatment Protocol] : Treatment Protocol [FreeTextEntry1] : Neoadjuvant Adriamycin 60mg/m2 + Cytoxan 600mg/m2 every 14 days x 4 cycles with Neulasta OnPro support (Day 1), taxol 80mg/m2 x 1, followed by CMF x 4 [de-identified] : Patient presents today for follow up. Post operatively she notes some lumpiness to the skin at the scar site with dog earring and some numbness to R side of the chest - this persists and is sore but overall improved. Her tongue and taste are better (still not fully back to normal). Dry run for radiation planned today (6 weeks of therapy). No other complaints - she feels very well and went hiking last week.

## 2019-08-27 NOTE — PHYSICAL EXAM
[Fully active, able to carry on all pre-disease performance without restriction] : Status 0 - Fully active, able to carry on all pre-disease performance without restriction [Normal] : affect appropriate [de-identified] : hairs growing back [de-identified] : ~1cm round nodule superficial/subcutaneous mid abdomen, nontender, unchanged [de-identified] : no masses or adenopathy palpable b/l, excess skin noted at b/l mastectomy sites with lateral dog earring and scar tissue

## 2019-08-28 ENCOUNTER — NON-APPOINTMENT (OUTPATIENT)
Age: 74
End: 2019-08-28

## 2019-08-28 ENCOUNTER — APPOINTMENT (OUTPATIENT)
Dept: CARDIOLOGY | Facility: CLINIC | Age: 74
End: 2019-08-28
Payer: MEDICARE

## 2019-08-28 VITALS
BODY MASS INDEX: 29.79 KG/M2 | SYSTOLIC BLOOD PRESSURE: 138 MMHG | OXYGEN SATURATION: 96 % | HEART RATE: 81 BPM | WEIGHT: 179 LBS | DIASTOLIC BLOOD PRESSURE: 88 MMHG

## 2019-08-28 PROCEDURE — 93000 ELECTROCARDIOGRAM COMPLETE: CPT

## 2019-08-28 PROCEDURE — 99214 OFFICE O/P EST MOD 30 MIN: CPT

## 2019-08-28 NOTE — HISTORY OF PRESENT ILLNESS
[FreeTextEntry1] : 73 year old woman Presents to establish care in [facility]\par Works:  Retired RN     single with adult children .\par \par Diagnosed with  ER -  SC -   Her 2/  [place]isa- right  breast cancer.\par Chemotherapy:  ACT planned\par \par Past medical history of: \par - hypertension  on medication\par - Hyperlipidemia?\par - NON  smoker\par - BMI  31\par \par Exercise: stationary bike, however METS 6 on a recent stress test\par Diet: low salt\par \par Tests: \par ECHO: Wall motion abn in inferolateral wall. EF preserved\par STRESS: No ischemia on EST plain\par \par \par Presents for precancer treatment evaluation, CV risk stratification   and further evaluation of wall motion abnormality before chemotherapy with ACT\par \par She is here with her son today\par \par ROS: Denies Chest pain, dyspnea, palpitations, dizziness or syncope.\par \par Interval Note\par February 13, 2109\par \par Patient returns for a follow up cardiac evaluation. She underwent a repeat echocardiogram today which was stable and unchanged. \par \par Chemotherapy cycle #2/4 planned for today: Adriamycin 60 mg/ m2 and Cytoxan every 14 days.\par June 4 ,2019 meeting surgeon, Dr. Demetrio Robertson  for bilateral mastectomy.\par \par Blood pressure is normotensive. EKG is stable and unchanged. \par \par Interval f/u 3/27/19\par \par doing well\par taking medication \par \par ROS: Denies Chest pain, dyspnea, palpitations, dizziness or syncope.\par \par Interval Note\par May 29, 2019\par \par Patient returns prior to her planned bilateral mastectomy. \par \par Disease: Breast Cancer\par Pathology: ER- SC -HER2-\par TNM stge: T2 N1\par AJCC stage: Anatomic llB, clinical Prognostic lllB \par \par Chemo review:\par Started on Neoadjuvant chemotherapy with AC on January 28, 2019 and Taxol #1 on  March 27 th.\par During her second Taxol infusion , she developed an infusion reaction and a vasculitic rash. Taxol was reattempted with Taxotere on April 17 th with chest discomfort, acute worsening rash , severe back and chest pain. \par Also developed hyperglycemia (A1C 8.0) and home glucose monitoring and PMD following\par \par *Started with CMP #1 with Neulasta on pro  on April 24, 2019 without any events. \par \par Final chemotherapy regimen to be completed on June 5, 2019 with bilateral mastectomy to be scheduled post treatment. \par \par Patient reports that most recently she has been suffering with a dry mouth and some mild burning sensation, trying to hydrate and fingerstick glucose has been unpredictable. All values <300 despite strict diet control. \par \par Otherwise offers no other complaints of shortness of breath, chest discomfort or palpations.\par \par Interval Note\par August 28, 2019\par \par Patient returns for a routine cardiac follow up. Blood pressure relatively  normotensive: 138/88.\par EKG sinus rhythm with rare APCs @ 84 bpm. \par \par Completed all neoadjuvant chemotherapy in July 2019. \par Patient is about to initiate radiation treatment for 25 cycles and will be completed in 5 weeks. Dry mouth and oral symptoms have dissipated since chemotherapy has been completed. \par \par Future treatment plan to follow will be oral  Xeloda twice daily for two weeks and one week off treatment in rotation.\par \par S/P bilateral mastectomy without reconstruction. Residual breast tissue healing,however, patient is not satisfied with appearance of post mastectomy chest wall. \par \par Recent blood work stable. Kidney function normal, H/H rising to normal values.  \par \par \par \par \par \par \par \par \par

## 2019-08-28 NOTE — DISCUSSION/SUMMARY
[FreeTextEntry1] : 73 year old female with history of right sided breast cancer. Completed CMP chemotherapy in mid July. Now s/p.bilateral mastectomy without reconstruction.\par Starting radiation treatment for 25 treatments ( 5 weeks)\par \par Assessment/ Plan\par -Repeat echo to reassess LV function in 6 months\par -Blood pressure under good control\par -Continue with Valsartan-HCTZ and Coreg\par -Continue with statin to treat hyperlipidemia\par -Blood sugars to be followed by PCP\par \par Follow up in 6 months.

## 2019-08-29 ENCOUNTER — APPOINTMENT (OUTPATIENT)
Dept: INFUSION THERAPY | Facility: HOSPITAL | Age: 74
End: 2019-08-29

## 2019-09-06 PROCEDURE — 77427 RADIATION TX MANAGEMENT X5: CPT

## 2019-09-06 NOTE — REVIEW OF SYSTEMS
[Alopecia: Grade 0] : Alopecia: Grade 0 [Pruritus: Grade 0] : Pruritus: Grade 0 [Skin Hyperpigmentation: Grade 0] : Skin Hyperpigmentation: Grade 0 [Skin Atrophy: Grade 0] : Skin Atrophy: Grade 0 [Dermatitis Radiation: Grade 0] : Dermatitis Radiation: Grade 0 [Skin Induration: Grade 0] : Skin Induration: Grade 0

## 2019-09-06 NOTE — DISEASE MANAGEMENT
[Clinical] : TNM Stage: c [IIB] : IIB [TTNM] : 2 [NTNM] : 1 [MTNM] : 0 [de-identified] : 1400 cGy [de-identified] : 500 cGy

## 2019-09-06 NOTE — VITALS
[Maximal Pain Intensity: 3/10] : 3/10 [Pain Description/Quality: ___] : Pain description/quality: [unfilled] [Least Pain Intensity: 0/10] : 0/10 [Pain Location: ___] : Pain Location: [unfilled] [Pain Duration: ___] : Pain duration: [unfilled] [OTC] : OTC [ECOG Performance Status: 0 - Fully active, able to carry on all pre-disease performance without restriction] : Performance Status: 0 - Fully active, able to carry on all pre-disease performance without restriction [90: Able to carry normal activity; minor signs or symptoms of disease.] : 90: Able to carry normal activity; minor signs or symptoms of disease.  [Date: ____________] : Patient's last distress assessment performed on [unfilled]. [3 - Distress Level] : Distress Level: 3 [Pain Interferes with ADLs] : Pain does not interfere with activities of daily living

## 2019-09-06 NOTE — PHYSICAL EXAM
[Normal] : well developed, well nourished, in no acute distress [No UE Edema] : there is no upper extremity edema [de-identified] : well-healed mastectomy scars bilaterally with foci of numbness

## 2019-09-06 NOTE — HISTORY OF PRESENT ILLNESS
[FreeTextEntry1] : Ms. 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 She is currently receiving adjuvant radiation therapy. \par \par She is feeling generally well. She denies fatigue and pain. She has not noted any skin reactions. She is using Aquaphor on the skin.\par

## 2019-09-12 NOTE — VITALS
[Maximal Pain Intensity: 1/10] : 1/10 [Least Pain Intensity: 0/10] : 0/10 [Pain Duration: ___] : Pain duration: [unfilled] [Pain Description/Quality: ___] : Pain description/quality: [unfilled] [Pain Location: ___] : Pain Location: [unfilled] [OTC] : OTC [90: Able to carry normal activity; minor signs or symptoms of disease.] : 90: Able to carry normal activity; minor signs or symptoms of disease.  [ECOG Performance Status: 0 - Fully active, able to carry on all pre-disease performance without restriction] : Performance Status: 0 - Fully active, able to carry on all pre-disease performance without restriction [Date: ____________] : Patient's last distress assessment performed on [unfilled]. [0 - No Distress] : Distress Level: 0 [Pain Interferes with ADLs] : Pain does not interfere with activities of daily living

## 2019-09-12 NOTE — REVIEW OF SYSTEMS
[Pruritus: Grade 0] : Pruritus: Grade 0 [Alopecia: Grade 0] : Alopecia: Grade 0 [Skin Atrophy: Grade 0] : Skin Atrophy: Grade 0 [Skin Induration: Grade 0] : Skin Induration: Grade 0 [Skin Hyperpigmentation: Grade 0] : Skin Hyperpigmentation: Grade 0 [Dermatitis Radiation: Grade 1 - Faint erythema or dry desquamation] : Dermatitis Radiation: Grade 1 - Faint erythema or dry desquamation

## 2019-09-12 NOTE — DISEASE MANAGEMENT
[Clinical] : TNM Stage: c [IIB] : IIB [FreeTextEntry4] : [id number](i+)M0 [TTNM] : 2 [NTNM] : 1 [de-identified] : 2200 cGy [MTNM] : 0 [de-identified] : right chest wall and nodes [de-identified] : 5000 cGy

## 2019-09-12 NOTE — PHYSICAL EXAM
[Normal] : well developed, well nourished, in no acute distress [No UE Edema] : there is no upper extremity edema [de-identified] : well-healed mastectomy scars bilaterally, mild erythema of right chest wall

## 2019-09-12 NOTE — REVIEW OF SYSTEMS
[Alopecia: Grade 0] : Alopecia: Grade 0 [Pruritus: Grade 0] : Pruritus: Grade 0 [Skin Atrophy: Grade 0] : Skin Atrophy: Grade 0 [Skin Induration: Grade 0] : Skin Induration: Grade 0 [Skin Hyperpigmentation: Grade 0] : Skin Hyperpigmentation: Grade 0 [Dermatitis Radiation: Grade 1 - Faint erythema or dry desquamation] : Dermatitis Radiation: Grade 1 - Faint erythema or dry desquamation

## 2019-09-12 NOTE — HISTORY OF PRESENT ILLNESS
[FreeTextEntry1] : Ms. 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 She is currently receiving adjuvant radiation therapy. \par \par She is feeling generally well, more energy this week. She denies fatigue and pain. She has noted mild redness of the skin. She is using Aquaphor on the skin.\par

## 2019-09-12 NOTE — DISEASE MANAGEMENT
[Clinical] : TNM Stage: c [IIB] : IIB [FreeTextEntry4] : [id number](i+)M0 [TTNM] : 2 [NTNM] : 1 [MTNM] : 0 [de-identified] : 2200 cGy [de-identified] : right chest wall and nodes [de-identified] : 5000 cGy

## 2019-09-12 NOTE — HISTORY OF PRESENT ILLNESS
[FreeTextEntry1] : Ms. 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 She is currently receiving adjuvant radiation therapy. \par \par She is feeling generally well, more energy this week. She denies fatigue and pain. She has noted mild redness of the skin. She is using Aquaphor on the skin.\par

## 2019-09-12 NOTE — VITALS
[Maximal Pain Intensity: 1/10] : 1/10 [Least Pain Intensity: 0/10] : 0/10 [Pain Description/Quality: ___] : Pain description/quality: [unfilled] [Pain Duration: ___] : Pain duration: [unfilled] [Pain Location: ___] : Pain Location: [unfilled] [OTC] : OTC [ECOG Performance Status: 0 - Fully active, able to carry on all pre-disease performance without restriction] : Performance Status: 0 - Fully active, able to carry on all pre-disease performance without restriction [90: Able to carry normal activity; minor signs or symptoms of disease.] : 90: Able to carry normal activity; minor signs or symptoms of disease.  [Date: ____________] : Patient's last distress assessment performed on [unfilled]. [0 - No Distress] : Distress Level: 0 [Pain Interferes with ADLs] : Pain does not interfere with activities of daily living

## 2019-09-12 NOTE — PHYSICAL EXAM
[Normal] : well developed, well nourished, in no acute distress [No UE Edema] : there is no upper extremity edema [de-identified] : well-healed mastectomy scars bilaterally, mild erythema of right chest wall

## 2019-09-13 PROCEDURE — 77427 RADIATION TX MANAGEMENT X5: CPT

## 2019-09-19 ENCOUNTER — OUTPATIENT (OUTPATIENT)
Dept: OUTPATIENT SERVICES | Facility: HOSPITAL | Age: 74
LOS: 1 days | End: 2019-09-19
Payer: MEDICARE

## 2019-09-19 ENCOUNTER — APPOINTMENT (OUTPATIENT)
Dept: MRI IMAGING | Facility: IMAGING CENTER | Age: 74
End: 2019-09-19
Payer: MEDICARE

## 2019-09-19 ENCOUNTER — APPOINTMENT (OUTPATIENT)
Dept: NEUROLOGY | Facility: CLINIC | Age: 74
End: 2019-09-19
Payer: MEDICARE

## 2019-09-19 DIAGNOSIS — Z92.21 PERSONAL HISTORY OF ANTINEOPLASTIC CHEMOTHERAPY: Chronic | ICD-10-CM

## 2019-09-19 DIAGNOSIS — R90.89 OTHER ABNORMAL FINDINGS ON DIAGNOSTIC IMAGING OF CENTRAL NERVOUS SYSTEM: ICD-10-CM

## 2019-09-19 DIAGNOSIS — Z90.5 ACQUIRED ABSENCE OF KIDNEY: Chronic | ICD-10-CM

## 2019-09-19 DIAGNOSIS — Z90.711 ACQUIRED ABSENCE OF UTERUS WITH REMAINING CERVICAL STUMP: Chronic | ICD-10-CM

## 2019-09-19 DIAGNOSIS — Z98.891 HISTORY OF UTERINE SCAR FROM PREVIOUS SURGERY: Chronic | ICD-10-CM

## 2019-09-19 PROCEDURE — A9585: CPT

## 2019-09-19 PROCEDURE — 99215 OFFICE O/P EST HI 40 MIN: CPT

## 2019-09-19 PROCEDURE — 70553 MRI BRAIN STEM W/O & W/DYE: CPT | Mod: 26

## 2019-09-19 PROCEDURE — 70553 MRI BRAIN STEM W/O & W/DYE: CPT

## 2019-09-19 NOTE — REVIEW OF SYSTEMS
[Alopecia: Grade 0] : Alopecia: Grade 0 [Pruritus: Grade 0] : Pruritus: Grade 0 [Skin Atrophy: Grade 0] : Skin Atrophy: Grade 0 [Skin Hyperpigmentation: Grade 1 - Hyperpigmentation covering <10% BSA; no psychosocial impact] : Skin Hyperpigmentation: Grade 1 - Hyperpigmentation covering <10% BSA; no psychosocial impact [Skin Induration: Grade 0] : Skin Induration: Grade 0 [Dermatitis Radiation: Grade 2 - Moderate to brisk erythema; patchy moist desquamation, mostly confined to skin folds and creases; moderate edema] : Dermatitis Radiation: Grade 2 - Moderate to brisk erythema; patchy moist desquamation, mostly confined to skin folds and creases; moderate edema

## 2019-09-19 NOTE — DISEASE MANAGEMENT
[Clinical] : TNM Stage: c [IIB] : IIB [FreeTextEntry4] : [id number](i+)M0 [TTNM] : 2 [NTNM] : 1 [MTNM] : 0 [de-identified] : 3200 cGy [de-identified] : 5000 cGy [de-identified] : right chest wall and nodes

## 2019-09-19 NOTE — PHYSICAL EXAM
[Normal] : well developed, well nourished, in no acute distress [No UE Edema] : there is no upper extremity edema [de-identified] : well-healed mastectomy scars bilaterally, moderate erythema of right chest wall, more so in axilla, no moist desquamation

## 2019-09-19 NOTE — VITALS
[Maximal Pain Intensity: 1/10] : 1/10 [Least Pain Intensity: 0/10] : 0/10 [Pain Description/Quality: ___] : Pain description/quality: [unfilled] [Pain Duration: ___] : Pain duration: [unfilled] [Pain Location: ___] : Pain Location: [unfilled] [OTC] : OTC [90: Able to carry normal activity; minor signs or symptoms of disease.] : 90: Able to carry normal activity; minor signs or symptoms of disease.  [ECOG Performance Status: 0 - Fully active, able to carry on all pre-disease performance without restriction] : Performance Status: 0 - Fully active, able to carry on all pre-disease performance without restriction [Date: ____________] : Patient's last distress assessment performed on [unfilled]. [0 - No Distress] : Distress Level: 0 [Pain Interferes with ADLs] : Pain does not interfere with activities of daily living

## 2019-09-19 NOTE — DATA REVIEWED
[de-identified] : I personally reviewed MR imaging dated\par 9/19/19	5/6/19	1/27/19	1/19/19 (PET)\par \par In reviewing these images, I find INTERVAL STABILITY of the expansile hyperintensity involving the left posterior thalamus, mostly confined to the pulvinar nucleus, but also extending into the posterior nucleus on the T2 weighted images, with signal change likely representing either a low grade neoplasm or perhaps an old ischemic injury, although the absence of atrophy argues against the latter. \par THERE IS NO HYPERMETABOLISM ON THE PET IMAGING, which extends to this region.\par On the contrast enhanced images, there is no abnormal enhancement.\par Overall I find the images to be stable. \par Selected imaging was provided to the patient, along with a detailed verbal explanation of the issues at hand as outlined above.\par

## 2019-09-19 NOTE — PHYSICAL EXAM
[General Appearance - Alert] : alert [General Appearance - In No Acute Distress] : in no acute distress [Oriented To Time, Place, And Person] : oriented to person, place, and time [Impaired Insight] : insight and judgment were intact [Affect] : the affect was normal [Person] : oriented to person [Place] : oriented to place [Time] : oriented to time [Concentration Intact] : normal concentrating ability [Visual Intact] : visual attention was ~T not ~L decreased [Naming Objects] : no difficulty naming common objects [Repeating Phrases] : no difficulty repeating a phrase [Writing A Sentence] : no difficulty writing a sentence [Fluency] : fluency intact [Comprehension] : comprehension intact [Reading] : reading intact [Past History] : adequate knowledge of personal past history [Cranial Nerves Optic (II)] : visual acuity intact bilaterally,  visual fields full to confrontation, pupils equal round and reactive to light [Cranial Nerves Oculomotor (III)] : extraocular motion intact [Cranial Nerves Trigeminal (V)] : facial sensation intact symmetrically [Cranial Nerves Facial (VII)] : face symmetrical [Cranial Nerves Vestibulocochlear (VIII)] : hearing was intact bilaterally [Cranial Nerves Glossopharyngeal (IX)] : tongue and palate midline [Cranial Nerves Accessory (XI - Cranial And Spinal)] : head turning and shoulder shrug symmetric [Cranial Nerves Hypoglossal (XII)] : there was no tongue deviation with protrusion [Motor Tone] : muscle tone was normal in all four extremities [Motor Strength] : muscle strength was normal in all four extremities [No Muscle Atrophy] : normal bulk in all four extremities [Paresis Pronator Drift Right-Sided] : no pronator drift on the right [Paresis Pronator Drift Left-Sided] : no pronator drift on the left [Motor Strength Upper Extremities Bilaterally] : strength was normal in both upper extremities [Motor Strength Lower Extremities Bilaterally] : strength was normal in both lower extremities [Sensation Tactile Decrease] : light touch was intact [Abnormal Walk] : normal gait [Balance] : balance was intact [Past-pointing] : there was no past-pointing [Tremor] : no tremor present [2+] : Ankle jerk left 2+ [Plantar Reflex Right Only] : normal on the right [Plantar Reflex Left Only] : normal on the left [FreeTextEntry4] : 3 cities in 10 minutes. Absolutely no memory deficits, nor any language difficulty.

## 2019-09-19 NOTE — DISCUSSION/SUMMARY
[FreeTextEntry1] : 74 yo RH woman with breast cancer and odd sensations in her mouth (unusual tastes and other [id number] complaints), found to have thalamic hyperintensity on MR imaging, which remains stable over 8 months of neuroimaging and is not hypermetabolic on PET imaging.\par \par SERIAL IMAGING – I recommend repeating an MRI in 6 months. I would like to see a repeat PET imaging study that includes this region if such a study is planned to evaluate her breast cancer in the future.\par \par ETIOLOGY – It is highly unlikely that she will require treatment for this in the future. Under such circumstances, I do not think the risks for biopsy merit the benefits. We discussed the potential for a low grade glioma and the lack of effective therapies and the slow growth limiting all therapies. She does not want a biopsy at this time.\par \par ODD TASTES – I suspect these are due to thalamic dysfunction. She says the odd tastes are mostly constant, but she does notice exacerbations and improvements throughout the day, typically associated with other sensations (bad sunburn, allergic reactions). Notably, as part of a pre-procedural regimen, she was told to take a medicine (she does not remember if it was Ativan or Xanax or something else) before a port was placed and the taste sensations resolved. They came back and persisted after receiving Taxol.  A trial of AEDs is not unreasonable to see if they resolve. We discussed keppra 500-500 and she is enthusiastic. If there is no response, then perhaps a benzodiazepine at low enough dose to permit daily activities would be reasonable.\par \par DISPO – She will return in one month for routine follow-up on the Keppra and in six months with a repeat brain MRI with contrast. She was also instructed to call me should a PET scan be scheduled so I can make sure the imaging includes the thalamus.\par

## 2019-09-19 NOTE — HISTORY OF PRESENT ILLNESS
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 \par \par She had odd sensations in her mouth preceding the diagnosis of breast cancer. These sensations were atypical for olfactory seizures in that she had them after eating very hot soup or hot pizza and the sensation was a persistence of the odd burning feeling. She reports trying brushing her teeth, using lots of mouthwash, and other manouvers to resolve the sensations, but nothing seems to help. She has noticed that when she is hyperglycemic, her mouth becomes dry and the sensations re-appear.\par \par There are no complaints of weakness, visual change, alfonso seizure or other seizure-like activity.\par \par She continues to have the unpleasant tastes. These are mostly constant but were worsened after a sunburn and when she cleaned out her basement. \par \par She is presently getting radiation therapy to the breast.\par \par

## 2019-09-19 NOTE — HISTORY OF PRESENT ILLNESS
[FreeTextEntry1] : Ms. 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 She is currently receiving adjuvant radiation therapy. \par \par She is feeling generally well, more energy this week. She denies fatigue and pain. She has noted increasing redness of the skin, some areas are still numb since the mastectomy. She is using Argon cream to her breast skin.

## 2019-09-20 PROCEDURE — 77427 RADIATION TX MANAGEMENT X5: CPT

## 2019-09-26 ENCOUNTER — OUTPATIENT (OUTPATIENT)
Dept: OUTPATIENT SERVICES | Facility: HOSPITAL | Age: 74
LOS: 1 days | Discharge: HOME | End: 2019-09-26

## 2019-09-26 DIAGNOSIS — C50.919 MALIGNANT NEOPLASM OF UNSPECIFIED SITE OF UNSPECIFIED FEMALE BREAST: ICD-10-CM

## 2019-09-26 DIAGNOSIS — Z90.711 ACQUIRED ABSENCE OF UTERUS WITH REMAINING CERVICAL STUMP: Chronic | ICD-10-CM

## 2019-09-26 DIAGNOSIS — Z92.21 PERSONAL HISTORY OF ANTINEOPLASTIC CHEMOTHERAPY: Chronic | ICD-10-CM

## 2019-09-26 DIAGNOSIS — Z98.891 HISTORY OF UTERINE SCAR FROM PREVIOUS SURGERY: Chronic | ICD-10-CM

## 2019-09-26 DIAGNOSIS — Z90.5 ACQUIRED ABSENCE OF KIDNEY: Chronic | ICD-10-CM

## 2019-09-26 NOTE — REVIEW OF SYSTEMS
[Alopecia: Grade 0] : Alopecia: Grade 0 [Skin Atrophy: Grade 0] : Skin Atrophy: Grade 0 [Skin Hyperpigmentation: Grade 1 - Hyperpigmentation covering <10% BSA; no psychosocial impact] : Skin Hyperpigmentation: Grade 1 - Hyperpigmentation covering <10% BSA; no psychosocial impact [Skin Induration: Grade 0] : Skin Induration: Grade 0 [Dermatitis Radiation: Grade 2 - Moderate to brisk erythema; patchy moist desquamation, mostly confined to skin folds and creases; moderate edema] : Dermatitis Radiation: Grade 2 - Moderate to brisk erythema; patchy moist desquamation, mostly confined to skin folds and creases; moderate edema [Pruritus: Grade 1 - Mild or localized; topical intervention indicated] : Pruritus: Grade 1 - Mild or localized; topical intervention indicated

## 2019-09-26 NOTE — VITALS
[Maximal Pain Intensity: 7/10] : 7/10 [Least Pain Intensity: 0/10] : 0/10 [Pain Description/Quality: ___] : Pain description/quality: [unfilled] [Pain Duration: ___] : Pain duration: [unfilled] [Pain Location: ___] : Pain Location: [unfilled] [OTC] : OTC [90: Able to carry normal activity; minor signs or symptoms of disease.] : 90: Able to carry normal activity; minor signs or symptoms of disease.  [ECOG Performance Status: 0 - Fully active, able to carry on all pre-disease performance without restriction] : Performance Status: 0 - Fully active, able to carry on all pre-disease performance without restriction [Date: ____________] : Patient's last distress assessment performed on [unfilled]. [0 - No Distress] : Distress Level: 0 [Pain Interferes with ADLs] : Pain does not interfere with activities of daily living

## 2019-09-26 NOTE — HISTORY OF PRESENT ILLNESS
[FreeTextEntry1] : Ms. 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 She is currently receiving adjuvant radiation therapy. \par \par She is feeling generally well. She developed pain in the right axilla last night. She took one ES Tylenol and was able to go back to sleep. She has noted increasing redness of the skin, some areas are still numb since the mastectomy. She is using Argon cream to her breast skin and has recently started using Aquaphor.

## 2019-09-26 NOTE — DISEASE MANAGEMENT
[Clinical] : TNM Stage: c [IIB] : IIB [FreeTextEntry4] : [id number](i+)M0 [TTNM] : 2 [NTNM] : 1 [MTNM] : 0 [de-identified] : 5000 cGy [de-identified] : 4533cGy [de-identified] : right chest wall and nodes

## 2019-09-26 NOTE — PHYSICAL EXAM
[Normal] : well developed, well nourished, in no acute distress [No UE Edema] : there is no upper extremity edema [de-identified] : well-healed mastectomy scars bilaterally, moderate erythema of right chest wall, more so in axilla, no moist desquamation

## 2019-09-27 PROCEDURE — 77427 RADIATION TX MANAGEMENT X5: CPT

## 2019-09-30 ENCOUNTER — RESULT REVIEW (OUTPATIENT)
Age: 74
End: 2019-09-30

## 2019-09-30 ENCOUNTER — APPOINTMENT (OUTPATIENT)
Dept: INFUSION THERAPY | Facility: HOSPITAL | Age: 74
End: 2019-09-30

## 2019-09-30 ENCOUNTER — LABORATORY RESULT (OUTPATIENT)
Age: 74
End: 2019-09-30

## 2019-09-30 LAB
BASOPHILS # BLD AUTO: 0 K/UL — SIGNIFICANT CHANGE UP (ref 0–0.2)
BASOPHILS NFR BLD AUTO: 0.2 % — SIGNIFICANT CHANGE UP (ref 0–2)
EOSINOPHIL # BLD AUTO: 0.4 K/UL — SIGNIFICANT CHANGE UP (ref 0–0.5)
EOSINOPHIL NFR BLD AUTO: 7.5 % — HIGH (ref 0–6)
HCT VFR BLD CALC: 36.1 % — SIGNIFICANT CHANGE UP (ref 34.5–45)
HGB BLD-MCNC: 12.1 G/DL — SIGNIFICANT CHANGE UP (ref 11.5–15.5)
LYMPHOCYTES # BLD AUTO: 0.6 K/UL — LOW (ref 1–3.3)
LYMPHOCYTES # BLD AUTO: 13.1 % — SIGNIFICANT CHANGE UP (ref 13–44)
MCHC RBC-ENTMCNC: 30.5 PG — SIGNIFICANT CHANGE UP (ref 27–34)
MCHC RBC-ENTMCNC: 33.6 G/DL — SIGNIFICANT CHANGE UP (ref 32–36)
MCV RBC AUTO: 90.6 FL — SIGNIFICANT CHANGE UP (ref 80–100)
MONOCYTES # BLD AUTO: 0.4 K/UL — SIGNIFICANT CHANGE UP (ref 0–0.9)
MONOCYTES NFR BLD AUTO: 7.6 % — SIGNIFICANT CHANGE UP (ref 2–14)
NEUTROPHILS # BLD AUTO: 3.5 K/UL — SIGNIFICANT CHANGE UP (ref 1.8–7.4)
NEUTROPHILS NFR BLD AUTO: 71.6 % — SIGNIFICANT CHANGE UP (ref 43–77)
PLATELET # BLD AUTO: 204 K/UL — SIGNIFICANT CHANGE UP (ref 150–400)
RBC # BLD: 3.98 M/UL — SIGNIFICANT CHANGE UP (ref 3.8–5.2)
RBC # FLD: 12 % — SIGNIFICANT CHANGE UP (ref 10.3–14.5)
WBC # BLD: 4.9 K/UL — SIGNIFICANT CHANGE UP (ref 3.8–10.5)
WBC # FLD AUTO: 4.9 K/UL — SIGNIFICANT CHANGE UP (ref 3.8–10.5)

## 2019-10-02 ENCOUNTER — APPOINTMENT (OUTPATIENT)
Dept: HEMATOLOGY ONCOLOGY | Facility: CLINIC | Age: 74
End: 2019-10-02

## 2019-10-02 PROCEDURE — 77427 RADIATION TX MANAGEMENT X5: CPT

## 2019-10-02 NOTE — REVIEW OF SYSTEMS
[Alopecia: Grade 0] : Alopecia: Grade 0 [Pruritus: Grade 1 - Mild or localized; topical intervention indicated] : Pruritus: Grade 1 - Mild or localized; topical intervention indicated [Skin Atrophy: Grade 0] : Skin Atrophy: Grade 0 [Skin Hyperpigmentation: Grade 1 - Hyperpigmentation covering <10% BSA; no psychosocial impact] : Skin Hyperpigmentation: Grade 1 - Hyperpigmentation covering <10% BSA; no psychosocial impact [Skin Induration: Grade 0] : Skin Induration: Grade 0 [Dermatitis Radiation: Grade 2 - Moderate to brisk erythema; patchy moist desquamation, mostly confined to skin folds and creases; moderate edema] : Dermatitis Radiation: Grade 2 - Moderate to brisk erythema; patchy moist desquamation, mostly confined to skin folds and creases; moderate edema

## 2019-10-10 NOTE — DISEASE MANAGEMENT
[Clinical] : TNM Stage: c [IIB] : IIB [FreeTextEntry4] : [id number](i+)M0 [TTNM] : 2 [NTNM] : 1 [MTNM] : 0 [de-identified] : 5000 cGy [de-identified] : 5000 cGy [de-identified] : right chest wall and nodes

## 2019-10-10 NOTE — HISTORY OF PRESENT ILLNESS
[FreeTextEntry1] : Ms. 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 She is currently receiving adjuvant radiation therapy. \par \par She is feeling generally well. She developed pain in the right axilla last night. She took one ES Tylenol and was able to go back to sleep. She has noted increasing redness of the skin, some areas are still numb since the mastectomy. She is using Argon cream to her breast skin and has recently started using Aquaphor.

## 2019-10-10 NOTE — VITALS
[Maximal Pain Intensity: 7/10] : 7/10 [Least Pain Intensity: 0/10] : 0/10 [Pain Description/Quality: ___] : Pain description/quality: [unfilled] [Pain Duration: ___] : Pain duration: [unfilled] [Pain Location: ___] : Pain Location: [unfilled] [OTC] : OTC [Date: ____________] : Patient's last distress assessment performed on [unfilled]. [0 - No Distress] : Distress Level: 0 [ECOG Performance Status: 1 - Restricted in physically strenuous activity but ambulatory and able to carry out work of a light or sedentary nature] : Performance Status: 1 - Restricted in physically strenuous activity but ambulatory and able to carry out work of a light or sedentary nature, e.g., light house work, office work [80: Normal activity with effort; some signs or symptoms of disease.] : 80: Normal activity with effort; some signs or symptoms of disease.  [Pain Interferes with ADLs] : Pain does not interfere with activities of daily living

## 2019-10-10 NOTE — PHYSICAL EXAM
[Normal] : well developed, well nourished, in no acute distress [No UE Edema] : there is no upper extremity edema [de-identified] : well-healed mastectomy scars bilaterally, moderate confluent erythema of right chest wall, more so in axilla with small punctate areas of blistering and desquamation beneath the breast and in the axilla.

## 2019-10-22 ENCOUNTER — RESULT REVIEW (OUTPATIENT)
Age: 74
End: 2019-10-22

## 2019-10-22 ENCOUNTER — APPOINTMENT (OUTPATIENT)
Dept: HEMATOLOGY ONCOLOGY | Facility: CLINIC | Age: 74
End: 2019-10-22
Payer: MEDICARE

## 2019-10-22 VITALS
TEMPERATURE: 98.5 F | SYSTOLIC BLOOD PRESSURE: 121 MMHG | OXYGEN SATURATION: 97 % | BODY MASS INDEX: 29.95 KG/M2 | RESPIRATION RATE: 18 BRPM | WEIGHT: 179.99 LBS | DIASTOLIC BLOOD PRESSURE: 81 MMHG | HEART RATE: 77 BPM

## 2019-10-22 LAB
ALBUMIN SERPL ELPH-MCNC: 4.3 G/DL
ALP BLD-CCNC: 62 U/L
ALT SERPL-CCNC: 20 U/L
ANION GAP SERPL CALC-SCNC: 14 MMOL/L
AST SERPL-CCNC: 18 U/L
BASOPHILS # BLD AUTO: 0 K/UL — SIGNIFICANT CHANGE UP (ref 0–0.2)
BASOPHILS NFR BLD AUTO: 0 % — SIGNIFICANT CHANGE UP (ref 0–2)
BILIRUB SERPL-MCNC: 0.4 MG/DL
BUN SERPL-MCNC: 21 MG/DL
CALCIUM SERPL-MCNC: 9.8 MG/DL
CHLORIDE SERPL-SCNC: 102 MMOL/L
CO2 SERPL-SCNC: 26 MMOL/L
CREAT SERPL-MCNC: 1.17 MG/DL
EOSINOPHIL # BLD AUTO: 0.2 K/UL — SIGNIFICANT CHANGE UP (ref 0–0.5)
EOSINOPHIL NFR BLD AUTO: 3.4 % — SIGNIFICANT CHANGE UP (ref 0–6)
GLUCOSE SERPL-MCNC: 155 MG/DL
HCT VFR BLD CALC: 37.2 % — SIGNIFICANT CHANGE UP (ref 34.5–45)
HGB BLD-MCNC: 12.8 G/DL — SIGNIFICANT CHANGE UP (ref 11.5–15.5)
LYMPHOCYTES # BLD AUTO: 0.6 K/UL — LOW (ref 1–3.3)
LYMPHOCYTES # BLD AUTO: 11.3 % — LOW (ref 13–44)
MAGNESIUM SERPL-MCNC: 1.6 MG/DL
MCHC RBC-ENTMCNC: 30.9 PG — SIGNIFICANT CHANGE UP (ref 27–34)
MCHC RBC-ENTMCNC: 34.4 G/DL — SIGNIFICANT CHANGE UP (ref 32–36)
MCV RBC AUTO: 89.6 FL — SIGNIFICANT CHANGE UP (ref 80–100)
MONOCYTES # BLD AUTO: 0.5 K/UL — SIGNIFICANT CHANGE UP (ref 0–0.9)
MONOCYTES NFR BLD AUTO: 8.5 % — SIGNIFICANT CHANGE UP (ref 2–14)
NEUTROPHILS # BLD AUTO: 4.3 K/UL — SIGNIFICANT CHANGE UP (ref 1.8–7.4)
NEUTROPHILS NFR BLD AUTO: 76.8 % — SIGNIFICANT CHANGE UP (ref 43–77)
PLATELET # BLD AUTO: 205 K/UL — SIGNIFICANT CHANGE UP (ref 150–400)
POTASSIUM SERPL-SCNC: 4.1 MMOL/L
PROT SERPL-MCNC: 6.3 G/DL
RBC # BLD: 4.15 M/UL — SIGNIFICANT CHANGE UP (ref 3.8–5.2)
RBC # FLD: 12.1 % — SIGNIFICANT CHANGE UP (ref 10.3–14.5)
SODIUM SERPL-SCNC: 142 MMOL/L
WBC # BLD: 5.6 K/UL — SIGNIFICANT CHANGE UP (ref 3.8–10.5)
WBC # FLD AUTO: 5.6 K/UL — SIGNIFICANT CHANGE UP (ref 3.8–10.5)

## 2019-10-22 PROCEDURE — 99215 OFFICE O/P EST HI 40 MIN: CPT

## 2019-10-23 ENCOUNTER — APPOINTMENT (OUTPATIENT)
Dept: NEUROLOGY | Facility: CLINIC | Age: 74
End: 2019-10-23
Payer: MEDICARE

## 2019-10-23 PROCEDURE — 99214 OFFICE O/P EST MOD 30 MIN: CPT

## 2019-10-23 NOTE — DISCUSSION/SUMMARY
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 She did have improvement of her odd tastes during her port placement procedure where "I got some IV medication and it all went away." While that suggests seizure activity (perhaps the IV sedative aborted epileptiform activity), the absence of response to Keppra is unfortunate\par \par ABNORMAL BRAIN MRI -- Probable glioma, but difficult region to biopsy to be certain as morbidity from such a proceudre is high. \par \par DISPO -- We will see her back in the office in March with repeat MRI with contrast. I suspect interval stability.

## 2019-10-23 NOTE — HISTORY OF PRESENT ILLNESS
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 \par \par She had odd sensations in her mouth preceding the diagnosis of breast cancer. These sensations were atypical for olfactory seizures in that she had them after eating very hot soup or hot pizza and the sensation was a persistence of the odd burning feeling. She reports trying brushing her teeth, using lots of mouthwash, and other maneuvers to resolve the sensations, but nothing seems to help. She has noticed that when she is hyperglycemic, her mouth becomes dry and the sensations re-appear.\par She tried taking Keppra, but she was unable to tolerate it due to fatigue and did not take it regularly.\par She notes no change in symptoms. \par \par There are no complaints of weakness, visual change, alfonso seizure or other seizure-like activity.\par \par She recently completed radiation therapy to the breast and is planning to start treatment with Xeloda. \par

## 2019-10-28 ENCOUNTER — OUTPATIENT (OUTPATIENT)
Dept: OUTPATIENT SERVICES | Facility: HOSPITAL | Age: 74
LOS: 1 days | Discharge: HOME | End: 2019-10-28

## 2019-10-28 DIAGNOSIS — Z90.5 ACQUIRED ABSENCE OF KIDNEY: Chronic | ICD-10-CM

## 2019-10-28 DIAGNOSIS — Z98.891 HISTORY OF UTERINE SCAR FROM PREVIOUS SURGERY: Chronic | ICD-10-CM

## 2019-10-28 DIAGNOSIS — Z92.21 PERSONAL HISTORY OF ANTINEOPLASTIC CHEMOTHERAPY: Chronic | ICD-10-CM

## 2019-10-28 DIAGNOSIS — C50.919 MALIGNANT NEOPLASM OF UNSPECIFIED SITE OF UNSPECIFIED FEMALE BREAST: ICD-10-CM

## 2019-10-28 DIAGNOSIS — Z90.711 ACQUIRED ABSENCE OF UTERUS WITH REMAINING CERVICAL STUMP: Chronic | ICD-10-CM

## 2019-10-29 NOTE — HISTORY OF PRESENT ILLNESS
[Disease: _____________________] : Disease: [unfilled] [T: ___] : T[unfilled] [N: ___] : N[unfilled] [AJCC Stage: ____] : AJCC Stage: [unfilled] [Treatment Protocol] : Treatment Protocol [de-identified] : 74yo woman presenting for medical oncology follow up.\par \par Last mammogram . She has no history of abnormal breast imaging, breast biopsies, or surgeries.\par \par She saw her PMD who sent her for a screening mammogram after she palpated a mass on the R side of her chest 2018. She had been having body aches/soreness and feeling general malaise since November, despite a course of antibiotics (just after flu shot administered). Also some R shoulder/arm pains.\par \par 18 Mammogram screenin.7cm irregular spiculated mass upper outer right breast 8cm FN with associated architectural distortion and several associated microcalcifications. Partially included enlarged right axillary lymph nodes. BIRADS 0\par \par 18 bilateral breast US: R breast 10:00 7cm FN 2.6cm irregular hypoechoic mass (biopsy recommended). Inferior right axillary lymph node with focal area of eccentric cortical thickening (recommend US guided biopsy). 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 Michael 7/9. 1.4cm involved, DCIS cribiform pattern with high grade nuclear atypia and apocrine cytology very focally present. (coil shaped clip) R axillary lymph node: metastatic ductal carcinoma with apocrine cytology involving a lymph node (hydromark marking clip) ER 0% MD 0% HER2 IHC 0 negative\par \par She saw Dr. Demetrio Robertson yesterday who recommended medical oncology consultation for neoadjuvant chemotherapy. \par \par She noted on intial visit with me some occasional body aches still and fatigue over the last month. She notes an intermittent tongue sensation - like a scald from food (though she does not recall an episode of this). Symptoms intermittent for months. Otherwise she feels well. She is active at baseline, uses stationary bike at home most days for exercise. \par \par 19 PET/CT: Superolateral R breast and R axillary lymph nodes FDG-avid. S/p left nephrectomy. Few subcm b/l pulmonary nodules are nonspecific. Please correlate clinically for infection and follow up with CT chest in 1 month.\par \par TTE 19: EF 64%, GLS -20 Wall motion abnormality in inferolateral wall reported, EKG stable. Patient seen by Dr. Lorrie Yadav (Cardiology) - TTE reviewed without significant wall motion abnormalities, antihypertensives changed \par \par MRI Brain w/wo contrast 19: L maxillary polyp v. retention cyst. Non enhancing area of abnormal T2 prolongation involving left thalamic region, nonspecific. Stable on repeat 2019.\par \par CT Chest 19: No interval changes since 19. Stable 3.3x2.1cm UOQ R breast, 2x1.2cm R axillary lymph node. Stable 4mm and smaller scattered indeterminant pulmonary nodules.\par \par She started neoadjuvant chemotherapy with AC on 19. Taxol #1 3/27.\par Taxol #2 with grade 3 infusion reaction, vasculitic rash development. Taxol reattempt with similar. Taxotere attempt  with chest discomfort, acute worsening of rash/itching, severe back pain radiating into the chest, a few minutes into infusion. Breast US performed with response to therapy noted.\par Also c/b hyperglycemia (A1C 8.0) - initiated home glucose monitoring and PMD follow up.\par \par CMF #1 with neulasta onpro started  without event; 4 cycles completed.\par \par b/l mastectomy 19 (Dr. Robertson): R breast: IDC, mod diff, 5.1mm (residual disease ranging in size from 1-2mm in tumor bed, approx 10-20% viable cells in 1.9x1.5x1.0cm tumor bed post-neoadjuvant treatment), DCIS grade 2-3 (4mm, 5/14 blocks), 2/4 sentinel nodes with ITCs on permanent section only. Margins negative\par [id number](i+)\par ER-MD-HER2 IHC 0 negative, PDL <1%.\par \par Pertinent History:\par No family history of breast or ovarian cancer.\par HTN - saw Cardiologist 2018, stress test normal per patient, Dr. Samaniego [facility], sees him annually. BP well controlled generally, now following with Dr. Yadav Oncocardiology\par HLD \par Single kidney (kidney donor to brother in ) - baseline Cr 1.2-1.3 though patient notes no prior renal issues\par Transaminitis AST 43, ALT 54 (18), no prior history, resolved since beginning treatment\par BCC of skin removed\par Hysterectomy for fibroid uterus\par Cscope last ~5 years ago, no polyps per patient\par PMD Lance Lafleur\par Single, lives with her son Richard. She has a daughter as well who works at [employer]. She is a retired RN.  [de-identified] : ER-HI-HER2- [FreeTextEntry1] : Neoadjuvant Adriamycin 60mg/m2 + Cytoxan 600mg/m2 every 14 days x 4 cycles with Neulasta OnPro support (Day 1), taxol 80mg/m2 x 1, followed by CMF x 4 [de-identified] : Patient presents today for follow up. Post operatively she notes some lumpiness to the skin at the scar site with dog earring and some numbness to R side of the chest - she is very unhappy about cosmetic outcome, cannot wear prosthesis well. She notes significant skin irritation after radiation now improving - she did take her Cruise recently. Her tongue and taste are better - had follow up with Dr. Jacobs today and will stop Keppra as has had no change to symptoms. No other current complaints or issues.

## 2019-10-29 NOTE — ASSESSMENT
[Curative] : Goals of care discussed with patient: Curative [FreeTextEntry1] : 72yo woman with history of HTN on multiple medications, kidney donor/CKD, ER-MA-HER2-, lymph-node positive right breast cancer anatomic stage IIB, AJCC 8th edition clinical prognostic stage IIIB, on neoadjuvant chemotherapy (dose-dense AC-->T) presenting for follow up. Course c/b neuropathy and a new rash after taxol, infusion reactions to both taxol and taxotere very quickly into infusion initiation. CT chest for cough 4/2019 with stable 3mm pulm nodules, decreased size of breast mass and axillary LN; US 4/17 with decrease in size of breast mass and axillary LN. She completed 4 cycles of CMF with OnPro q2 weeks. 7/11/19 b/l mastectomy with residual disease, negative margins (T1bN0(i+)Mx). S/p adjuvant radiation.\par \par I presented her case at the multidisciplinary breast tumor board previously; consensus for adjuvant radiation to breast/axilla and adjuvant xeloda per the CREATE-X study (which showed a significant benefit for adjuvant xeloda in TNBC treated with neoadjuvant chemotherapy with residual disease at the time of definitive surgery). We discussed risks/benefits and adverse effects of xeloda including but not limited to Hand-Foot-Syndrome (recommended aggressive moisturization of skin with either aquaphor or udder cream twice daily), increased risk of cytopenias and thus increased risk for infections which can be severe and life- threatening, diarrhea (for which anti-diarrheals can be used/prescribed in most-instances), hair thinning, and fatigue among others. Management of some adverse reactions may require temporary dose interruption/delay or permanent discontinuation. Patient expressed understanding of the risks and benefits of the treatment plan, had opportunity to ask questions, and oral and written consent was obtained previously as well as again today. She was given written information regarding the treatment plan and potential side effects for review at that time.\par \par Breast Cancer: neuropathy resolved, tolerated CMF without infusion reactions, s/p mastectomy as above\par -xeloda to begin now 2000mg PO BID, 2 weeks on, 1 week off - marshall if any new symptoms or concerns develop\par (with close attention to GFR, if GFR consistently 30-50 may require further dose reduction - noted 1250mg/m2 PO BID day 1-14 of 21 day cycle for 6-8 cycles per CREATE-X study NEJM)\par -check CBC/CMP today, port flushes q6 weeks to be scheduled, check labs with next cycle of xeloda\par -follow up surgeon - patient asking for referral for surgical revision, will ask RN navigator to assist\par -follow up radiaton oncology as recommended\par -patient's best contact home number: [phone], [phone]\par \par Abdominal nodule: nonspecific, superficial. patient had similar nodule in groin recently that resolved (related to hair follicle v. LN v. prior skin rash) - unchanged\par -US soft tissue c/w small lipoma\par -continue to monitor, advised patient if any change in size/shape to report/evaluate ASAP\par \par Hyperglycemia: in the setting of steroids, likely underlying insulin resistance/DMII undiagnosed. steroids now discontinued (though some given with each CMF dose), FS 100s-150s consistently now\par -continue metformin\par -hydration encouraged, continue to monitor\par -patient will continue to check FS glucose at home\par -follow up PMD, sees regularly\par \par MRI Brain findings: stable, nonspecific thalamic enhancement on repeat brain MRI\par -reviewed with Dr. Jacobs - continue treatment/breast cancer management, follow up MRI Brain stable, every 6 months recommended follow up with him\par \par Anemia: Hgb 8, normocytic, suspect AC related, improved to >10 now\par -continue to monitor as recovers, asymptomatic\par \par HTN: \par -follow up Dr. Yadav with repeat TTE planned in near future \par -continue new antihypertensives, coreg, and home BP monitoring per her recommendations (BP well controlled currently)\par

## 2019-10-29 NOTE — PHYSICAL EXAM
[Fully active, able to carry on all pre-disease performance without restriction] : Status 0 - Fully active, able to carry on all pre-disease performance without restriction [Normal] : affect appropriate [de-identified] : hairs growing back [de-identified] : no masses or adenopathy palpable b/l, excess skin noted at b/l mastectomy sites with lateral dog earring and scar tissue [de-identified] : ~1cm round nodule superficial/subcutaneous mid abdomen, nontender, unchanged

## 2019-11-12 ENCOUNTER — LABORATORY RESULT (OUTPATIENT)
Age: 74
End: 2019-11-12

## 2019-11-12 ENCOUNTER — RESULT REVIEW (OUTPATIENT)
Age: 74
End: 2019-11-12

## 2019-11-12 ENCOUNTER — APPOINTMENT (OUTPATIENT)
Dept: INFUSION THERAPY | Facility: HOSPITAL | Age: 74
End: 2019-11-12

## 2019-11-12 ENCOUNTER — APPOINTMENT (OUTPATIENT)
Dept: HEMATOLOGY ONCOLOGY | Facility: CLINIC | Age: 74
End: 2019-11-12
Payer: MEDICARE

## 2019-11-12 ENCOUNTER — OTHER (OUTPATIENT)
Age: 74
End: 2019-11-12

## 2019-11-12 VITALS
HEART RATE: 80 BPM | RESPIRATION RATE: 18 BRPM | WEIGHT: 190.48 LBS | TEMPERATURE: 98.3 F | BODY MASS INDEX: 31.7 KG/M2 | DIASTOLIC BLOOD PRESSURE: 72 MMHG | OXYGEN SATURATION: 96 % | SYSTOLIC BLOOD PRESSURE: 115 MMHG

## 2019-11-12 LAB
BASOPHILS # BLD AUTO: 0 K/UL — SIGNIFICANT CHANGE UP (ref 0–0.2)
BASOPHILS NFR BLD AUTO: 0.1 % — SIGNIFICANT CHANGE UP (ref 0–2)
EOSINOPHIL # BLD AUTO: 0.1 K/UL — SIGNIFICANT CHANGE UP (ref 0–0.5)
EOSINOPHIL NFR BLD AUTO: 2 % — SIGNIFICANT CHANGE UP (ref 0–6)
HCT VFR BLD CALC: 33.8 % — LOW (ref 34.5–45)
HGB BLD-MCNC: 11.7 G/DL — SIGNIFICANT CHANGE UP (ref 11.5–15.5)
LYMPHOCYTES # BLD AUTO: 1 K/UL — SIGNIFICANT CHANGE UP (ref 1–3.3)
LYMPHOCYTES # BLD AUTO: 17.6 % — SIGNIFICANT CHANGE UP (ref 13–44)
MCHC RBC-ENTMCNC: 30.1 PG — SIGNIFICANT CHANGE UP (ref 27–34)
MCHC RBC-ENTMCNC: 34.5 G/DL — SIGNIFICANT CHANGE UP (ref 32–36)
MCV RBC AUTO: 87.2 FL — SIGNIFICANT CHANGE UP (ref 80–100)
MONOCYTES # BLD AUTO: 0.3 K/UL — SIGNIFICANT CHANGE UP (ref 0–0.9)
MONOCYTES NFR BLD AUTO: 5.8 % — SIGNIFICANT CHANGE UP (ref 2–14)
NEUTROPHILS # BLD AUTO: 4.4 K/UL — SIGNIFICANT CHANGE UP (ref 1.8–7.4)
NEUTROPHILS NFR BLD AUTO: 74.4 % — SIGNIFICANT CHANGE UP (ref 43–77)
PLATELET # BLD AUTO: 206 K/UL — SIGNIFICANT CHANGE UP (ref 150–400)
RBC # BLD: 3.88 M/UL — SIGNIFICANT CHANGE UP (ref 3.8–5.2)
RBC # FLD: 12.4 % — SIGNIFICANT CHANGE UP (ref 10.3–14.5)
WBC # BLD: 6 K/UL — SIGNIFICANT CHANGE UP (ref 3.8–10.5)
WBC # FLD AUTO: 6 K/UL — SIGNIFICANT CHANGE UP (ref 3.8–10.5)

## 2019-11-12 PROCEDURE — 99214 OFFICE O/P EST MOD 30 MIN: CPT

## 2019-11-12 RX ORDER — SILVER SULFADIAZINE 10 G/1000G
1 CREAM TOPICAL TWICE DAILY
Qty: 1 | Refills: 0 | Status: DISCONTINUED | COMMUNITY
Start: 2019-10-02 | End: 2019-11-12

## 2019-11-12 RX ORDER — SILVER SULFADIAZINE 10 MG/G
1 CREAM TOPICAL
Qty: 1 | Refills: 0 | Status: DISCONTINUED | COMMUNITY
Start: 2019-10-16 | End: 2019-11-12

## 2019-11-12 RX ORDER — LEVETIRACETAM 500 MG/1
500 TABLET, FILM COATED ORAL TWICE DAILY
Qty: 60 | Refills: 6 | Status: DISCONTINUED | COMMUNITY
Start: 2019-09-19 | End: 2019-11-12

## 2019-11-12 RX ORDER — METOCLOPRAMIDE 10 MG/1
10 TABLET ORAL EVERY 8 HOURS
Qty: 30 | Refills: 0 | Status: DISCONTINUED | COMMUNITY
Start: 2019-02-27 | End: 2019-11-12

## 2019-11-12 NOTE — ASSESSMENT
[Curative] : Goals of care discussed with patient: Curative [FreeTextEntry1] : 75yo woman with history of HTN on multiple medications, kidney donor/CKD, ER-LA-HER2-, lymph-node positive right breast cancer anatomic stage IIB, AJCC 8th edition clinical prognostic stage IIIB, on neoadjuvant chemotherapy (dose-dense AC-->T) presenting for follow up. Course c/b neuropathy and a new rash after taxol, infusion reactions to both taxol and taxotere very quickly into infusion initiation. CT chest for cough 4/2019 with stable 3mm pulm nodules, decreased size of breast mass and axillary LN; US 4/17 with decrease in size of breast mass and axillary LN. She completed 4 cycles of CMF with OnPro q2 weeks. 7/11/19 b/l mastectomy with residual disease, negative margins (T1bN0(i+)Mx). S/p adjuvant radiation and started on adjuvant Xeloda (2000mg PO BID, 2 weeks on, 1 week off). She is doing well currently. \par \par Breast Cancer: neuropathy resolved, tolerated CMF without infusion reactions, s/p mastectomy as above\par -hold C2 xeloda for now\par -dermatology evaluation, consider skin biopsy (?vasculitis, relation to CNS findings?)\par -check CBC/CMP today, port flushes q3 weeks to be scheduled, check labs with each cycle of xeloda\par -follow up surgeon - patient asking for referral for surgical revision, will ask RN navigator to assist, readdressed\par -follow up radiation oncology as recommended\par -patient's best contact home number: [phone], [phone]\par -follow up with me in 1-2 weeks, will consider whether to resume adjuvant xeloda pending derm eval\par \par Abdominal nodule: nonspecific, superficial. patient had similar nodule in groin recently that resolved (related to hair follicle v. LN v. prior skin rash) - unchanged\par -US soft tissue c/w small lipoma\par -continue to monitor, advised patient if any change in size/shape to report/evaluate ASAP\par \par Hyperglycemia: in the setting of steroids, likely underlying insulin resistance/DMII undiagnosed. steroids now discontinued (though some given with each CMF dose), FS 100s-150s consistently now\par -continue metformin\par -hydration encouraged, continue to monitor\par -patient will continue to check FS glucose at home\par -follow up PMD, sees regularly\par \par MRI Brain findings: stable, nonspecific thalamic enhancement on repeat brain MRI\par -reviewed with Dr. Jacobs - continue treatment/breast cancer management, follow up MRI Brain stable, every 6 months recommended follow up with him, next MRI planned 3/2020\par \par Anemia: Hgb 8, normocytic, suspect AC related, improved to >10 now\par -continue to monitor as recovers, asymptomatic\par \par HTN: \par -follow up Dr. Yadav with repeat TTE planned in near future \par -continue new antihypertensives, coreg, and home BP monitoring per her recommendations (BP well controlled currently)\par

## 2019-11-12 NOTE — HISTORY OF PRESENT ILLNESS
[Disease: _____________________] : Disease: [unfilled] [T: ___] : T[unfilled] [N: ___] : N[unfilled] [AJCC Stage: ____] : AJCC Stage: [unfilled] [Treatment Protocol] : Treatment Protocol [de-identified] : Fanny is presenting for medical oncology follow up.\par \par Last mammogram . She has no history of abnormal breast imaging, breast biopsies, or surgeries.\par \par She saw her PMD who sent her for a screening mammogram after she palpated a mass on the R side of her chest 2018. She had been having body aches/soreness and feeling general malaise since November, despite a course of antibiotics (just after flu shot administered). Also some R shoulder/arm pains.\par \par 18 Mammogram screenin.7cm irregular spiculated mass upper outer right breast 8cm FN with associated architectural distortion and several associated microcalcifications. Partially included enlarged right axillary lymph nodes. BIRADS 0\par \par 18 bilateral breast US: R breast 10:00 7cm FN 2.6cm irregular hypoechoic mass (biopsy recommended). Inferior right axillary lymph node with focal area of eccentric cortical thickening (recommend US guided biopsy). 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 Michael 7/9. 1.4cm involved, DCIS cribiform pattern with high grade nuclear atypia and apocrine cytology very focally present. (coil shaped clip) R axillary lymph node: metastatic ductal carcinoma with apocrine cytology involving a lymph node (hydromark marking clip) ER 0% AL 0% HER2 IHC 0 negative\par \par She saw Dr. Demetrio Robertson yesterday who recommended medical oncology consultation for neoadjuvant chemotherapy. \par \par She noted on intial visit with me some occasional body aches still and fatigue over the last month. She notes an intermittent tongue sensation - like a scald from food (though she does not recall an episode of this). Symptoms intermittent for months. Otherwise she feels well. She is active at baseline, uses stationary bike at home most days for exercise. \par \par 19 PET/CT: Superolateral R breast and R axillary lymph nodes FDG-avid. S/p left nephrectomy. Few subcm b/l pulmonary nodules are nonspecific. Please correlate clinically for infection and follow up with CT chest in 1 month.\par \par TTE 19: EF 64%, GLS -20 Wall motion abnormality in inferolateral wall reported, EKG stable. Patient seen by Dr. Lorrie Yadav (Cardiology) - TTE reviewed without significant wall motion abnormalities, antihypertensives changed \par \par MRI Brain w/wo contrast 19: L maxillary polyp v. retention cyst. Non enhancing area of abnormal T2 prolongation involving left thalamic region, nonspecific. Stable on repeat 2019.\par \par CT Chest 19: No interval changes since 19. Stable 3.3x2.1cm UOQ R breast, 2x1.2cm R axillary lymph node. Stable 4mm and smaller scattered indeterminant pulmonary nodules.\par \par She started neoadjuvant chemotherapy with AC on 19. Taxol #1 3/27.\par Taxol #2 with grade 3 infusion reaction, vasculitic rash development. Taxol reattempt with similar. Taxotere attempt  with chest discomfort, acute worsening of rash/itching, severe back pain radiating into the chest, a few minutes into infusion. Breast US performed with response to therapy noted.\par Also c/b hyperglycemia (A1C 8.0) - initiated home glucose monitoring and PMD follow up.\par \par CMF #1 with neulasta onpro started  without event; 4 cycles completed.\par \par b/l mastectomy 19 (Dr. Robertson): R breast: IDC, mod diff, 5.1mm (residual disease ranging in size from 1-2mm in tumor bed, approx 10-20% viable cells in 1.9x1.5x1.0cm tumor bed post-neoadjuvant treatment), DCIS grade 2-3 (4mm, 5/14 blocks), 2/4 sentinel nodes with ITCs on permanent section only. Margins negative\par [id number](i+)\par ER-AL-HER2 IHC 0 negative, PDL <1%.\par \par She completed post-mastectomy RT c/b radiation dermatitis now resolved.\par She started adjuvant xeloda for residual disease (2000mg PO BID 2 weeks on, 1 week off) in Oct 2019.\par \par Pertinent History:\par No family history of breast or ovarian cancer.\par HTN - saw Cardiologist 2018, stress test normal per patient, Dr. Sam[facility], sees him annually. BP well controlled generally, now following with Dr. Yadav Onc[facility]\par HLD \par Single kidney (kidney donor to brother in ) - baseline Cr 1.2-1.3 though patient notes no prior renal issues\par Transaminitis AST 43, ALT 54 (18), no prior history, resolved since beginning treatment\par BCC of skin removed\par Hysterectomy for fibroid uterus\par Cscope last ~5 years ago, no polyps per patient\par PMD Lance Lafleur\par Single, lives with her son Richard. She has a daughter as well who works at [employer]. She is a retired RN.  [de-identified] : ER-TN-HER2- [FreeTextEntry1] : Neoadjuvant Adriamycin 60mg/m2 + Cytoxan 600mg/m2 every 14 days x 4 cycles with Neulasta OnPro support (Day 1), taxol 80mg/m2 x 1, followed by CMF x 4 [de-identified] : Patient presents today for follow up. She started xeloda without issues. During second week (Day 11-12 of treatment) she noted new skin lesions/blotches, similar to what had developed with taxol, some burning sensation, kept taking xeloda, felt new ones coming up, held for off week as scheduled and noted they improved since Sunday.\par No other current complaints or issues. She notes they are there and fading - very mild burning sensation sometimes at lesion locations only.

## 2019-11-12 NOTE — PHYSICAL EXAM
[Fully active, able to carry on all pre-disease performance without restriction] : Status 0 - Fully active, able to carry on all pre-disease performance without restriction [Normal] : affect appropriate [de-identified] : hairs growing back [de-identified] : ~1cm round nodule superficial/subcutaneous mid abdomen, nontender, unchanged [de-identified] : no masses or adenopathy palpable b/l, excess skin noted at b/l mastectomy sites with lateral dog earring and scar tissue [de-identified] : very faint small round red raised lesions, non tender, few on UEs, 1-2 on each leg noted

## 2019-11-13 ENCOUNTER — LABORATORY RESULT (OUTPATIENT)
Age: 74
End: 2019-11-13

## 2019-11-13 ENCOUNTER — APPOINTMENT (OUTPATIENT)
Dept: DERMATOLOGY | Facility: CLINIC | Age: 74
End: 2019-11-13
Payer: MEDICARE

## 2019-11-13 PROCEDURE — 11102 TANGNTL BX SKIN SINGLE LES: CPT

## 2019-11-13 PROCEDURE — 99214 OFFICE O/P EST MOD 30 MIN: CPT | Mod: 25

## 2019-11-13 NOTE — HISTORY OF PRESENT ILLNESS
[FreeTextEntry1] : FUV: rash [de-identified] : Interval history: Patient recently started capcitebine and noted during her second week of treatment she developed "red blotches" on her arms and face that burned. Used some cortisone cream with improvement. Felt her legs were also involved. No involvement of her trunk. No other systemic symptoms. Now improving on its own. \par \par Presenting hx:\par 73F with a hx of Stage IIB Breast CA s/p dose-dense AC --> T here for rash 2/2 to taxol. Noted to have developed a burning rash on the UE and LE on 3/29 after her first dose of taxol. Was seen by an outside dermatologist and given HC without improvement. Received another dose of Taxol and the rash recurred along with chest pressure and SOB. At that time, she was started on betamethasone cream for her rash which helped; also had received dexamethasone x 3 days around infusion which helped. Was trialed on taxotere on 4/17/19 and noted recurrence of rash which once again improved with dexamethasone. Since then rash has been slowly fading. No further burning. Using betamethasone cream.  Now s/p 4 cycles of CMF s/p bilateral mastectomy and adjuvant radiation

## 2019-11-13 NOTE — PHYSICAL EXAM
[Well Nourished] : well nourished [Alert] : alert [Oriented x 3] : ~L oriented x 3 [Conjunctiva Non-injected] : conjunctiva non-injected [No Visual Lymphadenopathy] : no visual  lymphadenopathy [No Edema] : no edema [No Clubbing] : no clubbing [No Bromhidrosis] : no bromhidrosis [No Chromhidrosis] : no chromhidrosis [FreeTextEntry3] : Thin scaly erythematous red brown patches on the arms. Legs clear\par Face with few scaly erythematous papules

## 2019-11-18 ENCOUNTER — APPOINTMENT (OUTPATIENT)
Dept: RADIATION ONCOLOGY | Facility: CLINIC | Age: 74
End: 2019-11-18
Payer: MEDICARE

## 2019-11-18 VITALS
HEART RATE: 88 BPM | RESPIRATION RATE: 17 BRPM | WEIGHT: 190 LBS | SYSTOLIC BLOOD PRESSURE: 109 MMHG | OXYGEN SATURATION: 96 % | BODY MASS INDEX: 31.62 KG/M2 | DIASTOLIC BLOOD PRESSURE: 70 MMHG

## 2019-11-18 PROCEDURE — 99024 POSTOP FOLLOW-UP VISIT: CPT

## 2019-11-19 NOTE — HISTORY OF PRESENT ILLNESS
[FreeTextEntry1] : Ms. 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 She underwent adjuvant radiation therapy to the right chest wall and regional nodes. She received a dose of 5000cGy in standard fractionation to the chest wall and nodes, completed on 10/2/19. She returns today for the first post-treatment evaluation.\par \par She developed grade 2 dermatitis following treatment which was managed with Vaseline gauze and Silvadene. Today, she reports feeling generally well. She is involved in her usual activities. She has occasional fatigue but it seems to be improving. She denies cough or shortness of breath.  There has been some improvement in the skin since completing radiation therapy. She continues regular skin care with Argon oil. She does not require analgesics. She started adjuvant therapy with Xeloda and developed a skin reaction, for which she saw Dr. Mujica. A biopsy was obtained with results pending, but inflammatory reaction likely.

## 2019-11-19 NOTE — PHYSICAL EXAM
[General Appearance - Well Developed] : well developed [Sclera] : the sclera and conjunctiva were normal [] : no respiratory distress [Heart Rate And Rhythm] : heart rate and rhythm were normal [Normal] : no palpable adenopathy [Supraclavicular Lymph Nodes Enlarged Bilaterally] : supraclavicular [Axillary Lymph Nodes Enlarged Bilaterally] : axillary [de-identified] : s/p right mastectomy, mild edema of soft tissues, no moist desquamation, mild residual hyperpigmentation and smooth skin fibrosis in axilla

## 2019-11-19 NOTE — REVIEW OF SYSTEMS
[Negative] : Allergic/Immunologic [Pruritus: Grade 0] : Pruritus: Grade 0 [Skin Atrophy: Grade 0] : Skin Atrophy: Grade 0 [Alopecia: Grade 0] : Alopecia: Grade 0 [Skin Induration: Grade 0] : Skin Induration: Grade 0 [Skin Hyperpigmentation: Grade 1 - Hyperpigmentation covering <10% BSA; no psychosocial impact] : Skin Hyperpigmentation: Grade 1 - Hyperpigmentation covering <10% BSA; no psychosocial impact [Dermatitis Radiation: Grade 0] : Dermatitis Radiation: Grade 0

## 2019-11-20 ENCOUNTER — RESULT REVIEW (OUTPATIENT)
Age: 74
End: 2019-11-20

## 2019-12-10 ENCOUNTER — MESSAGE (OUTPATIENT)
Age: 74
End: 2019-12-10

## 2019-12-10 ENCOUNTER — OUTPATIENT (OUTPATIENT)
Dept: OUTPATIENT SERVICES | Facility: HOSPITAL | Age: 74
LOS: 1 days | Discharge: HOME | End: 2019-12-10

## 2019-12-10 DIAGNOSIS — Z90.711 ACQUIRED ABSENCE OF UTERUS WITH REMAINING CERVICAL STUMP: Chronic | ICD-10-CM

## 2019-12-10 DIAGNOSIS — Z92.21 PERSONAL HISTORY OF ANTINEOPLASTIC CHEMOTHERAPY: Chronic | ICD-10-CM

## 2019-12-10 DIAGNOSIS — C50.919 MALIGNANT NEOPLASM OF UNSPECIFIED SITE OF UNSPECIFIED FEMALE BREAST: ICD-10-CM

## 2019-12-10 DIAGNOSIS — Z98.891 HISTORY OF UTERINE SCAR FROM PREVIOUS SURGERY: Chronic | ICD-10-CM

## 2019-12-10 DIAGNOSIS — Z90.5 ACQUIRED ABSENCE OF KIDNEY: Chronic | ICD-10-CM

## 2019-12-16 RX ORDER — LIDOCAINE AND PRILOCAINE 25; 25 MG/G; MG/G
2.5-2.5 CREAM TOPICAL
Qty: 1 | Refills: 5 | Status: DISCONTINUED | COMMUNITY
Start: 2019-01-29 | End: 2019-12-16

## 2019-12-17 ENCOUNTER — APPOINTMENT (OUTPATIENT)
Dept: INFUSION THERAPY | Facility: HOSPITAL | Age: 74
End: 2019-12-17

## 2019-12-17 ENCOUNTER — RESULT REVIEW (OUTPATIENT)
Age: 74
End: 2019-12-17

## 2019-12-17 ENCOUNTER — APPOINTMENT (OUTPATIENT)
Dept: HEMATOLOGY ONCOLOGY | Facility: CLINIC | Age: 74
End: 2019-12-17
Payer: MEDICARE

## 2019-12-17 ENCOUNTER — LABORATORY RESULT (OUTPATIENT)
Age: 74
End: 2019-12-17

## 2019-12-17 VITALS
DIASTOLIC BLOOD PRESSURE: 70 MMHG | SYSTOLIC BLOOD PRESSURE: 130 MMHG | HEART RATE: 88 BPM | TEMPERATURE: 98 F | BODY MASS INDEX: 32.1 KG/M2 | OXYGEN SATURATION: 96 % | WEIGHT: 192.9 LBS | RESPIRATION RATE: 18 BRPM

## 2019-12-17 LAB
BASOPHILS # BLD AUTO: 0 K/UL — SIGNIFICANT CHANGE UP (ref 0–0.2)
BASOPHILS NFR BLD AUTO: 0 % — SIGNIFICANT CHANGE UP (ref 0–2)
EOSINOPHIL # BLD AUTO: 0.2 K/UL — SIGNIFICANT CHANGE UP (ref 0–0.5)
EOSINOPHIL NFR BLD AUTO: 3.9 % — SIGNIFICANT CHANGE UP (ref 0–6)
HCT VFR BLD CALC: 30.5 % — LOW (ref 34.5–45)
HGB BLD-MCNC: 10.8 G/DL — LOW (ref 11.5–15.5)
LYMPHOCYTES # BLD AUTO: 0.8 K/UL — LOW (ref 1–3.3)
LYMPHOCYTES # BLD AUTO: 14.7 % — SIGNIFICANT CHANGE UP (ref 13–44)
MCHC RBC-ENTMCNC: 32.6 PG — SIGNIFICANT CHANGE UP (ref 27–34)
MCHC RBC-ENTMCNC: 35.4 G/DL — SIGNIFICANT CHANGE UP (ref 32–36)
MCV RBC AUTO: 91.9 FL — SIGNIFICANT CHANGE UP (ref 80–100)
MONOCYTES # BLD AUTO: 0.5 K/UL — SIGNIFICANT CHANGE UP (ref 0–0.9)
MONOCYTES NFR BLD AUTO: 10.3 % — SIGNIFICANT CHANGE UP (ref 2–14)
NEUTROPHILS # BLD AUTO: 3.6 K/UL — SIGNIFICANT CHANGE UP (ref 1.8–7.4)
NEUTROPHILS NFR BLD AUTO: 71.1 % — SIGNIFICANT CHANGE UP (ref 43–77)
PLATELET # BLD AUTO: 152 K/UL — SIGNIFICANT CHANGE UP (ref 150–400)
RBC # BLD: 3.32 M/UL — LOW (ref 3.8–5.2)
RBC # FLD: 16.1 % — HIGH (ref 10.3–14.5)
WBC # BLD: 5.1 K/UL — SIGNIFICANT CHANGE UP (ref 3.8–10.5)
WBC # FLD AUTO: 5.1 K/UL — SIGNIFICANT CHANGE UP (ref 3.8–10.5)

## 2019-12-17 PROCEDURE — 99214 OFFICE O/P EST MOD 30 MIN: CPT

## 2019-12-20 NOTE — HISTORY OF PRESENT ILLNESS
[Disease: _____________________] : Disease: [unfilled] [T: ___] : T[unfilled] [N: ___] : N[unfilled] [AJCC Stage: ____] : AJCC Stage: [unfilled] [de-identified] : ER-LA-HER2- [de-identified] : Fanny is presenting for medical oncology follow up.\par \par Last mammogram . She has no history of abnormal breast imaging, breast biopsies, or surgeries.\par \par She saw her PMD who sent her for a screening mammogram after she palpated a mass on the R side of her chest 2018. She had been having body aches/soreness and feeling general malaise since November, despite a course of antibiotics (just after flu shot administered). Also some R shoulder/arm pains.\par \par 18 Mammogram screenin.7cm irregular spiculated mass upper outer right breast 8cm FN with associated architectural distortion and several associated microcalcifications. Partially included enlarged right axillary lymph nodes. BIRADS 0\par \par 18 bilateral breast US: R breast 10:00 7cm FN 2.6cm irregular hypoechoic mass (biopsy recommended). Inferior right axillary lymph node with focal area of eccentric cortical thickening (recommend US guided biopsy). 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 Michael 7/9. 1.4cm involved, DCIS cribiform pattern with high grade nuclear atypia and apocrine cytology very focally present. (coil shaped clip) R axillary lymph node: metastatic ductal carcinoma with apocrine cytology involving a lymph node (hydromark marking clip) ER 0% SC 0% HER2 IHC 0 negative\par \par She saw Dr. Demetrio Robertson yesterday who recommended medical oncology consultation for neoadjuvant chemotherapy. \par \par She noted on intial visit with me some occasional body aches still and fatigue over the last month. She notes an intermittent tongue sensation - like a scald from food (though she does not recall an episode of this). Symptoms intermittent for months. Otherwise she feels well. She is active at baseline, uses stationary bike at home most days for exercise. \par \par 19 PET/CT: Superolateral R breast and R axillary lymph nodes FDG-avid. S/p left nephrectomy. Few subcm b/l pulmonary nodules are nonspecific. Please correlate clinically for infection and follow up with CT chest in 1 month.\par \par TTE 19: EF 64%, GLS -20 Wall motion abnormality in inferolateral wall reported, EKG stable. Patient seen by Dr. Lorrie Yadav (Cardiology) - TTE reviewed without significant wall motion abnormalities, antihypertensives changed \par \par MRI Brain w/wo contrast 19: L maxillary polyp v. retention cyst. Non enhancing area of abnormal T2 prolongation involving left thalamic region, nonspecific. Stable on repeat 2019.\par \par CT Chest 19: No interval changes since 19. Stable 3.3x2.1cm UOQ R breast, 2x1.2cm R axillary lymph node. Stable 4mm and smaller scattered indeterminant pulmonary nodules.\par \par She started neoadjuvant chemotherapy with AC on 19. Taxol #1 3/27.\par Taxol #2 with grade 3 infusion reaction, vasculitic rash development. Taxol reattempt with similar. Taxotere attempt  with chest discomfort, acute worsening of rash/itching, severe back pain radiating into the chest, a few minutes into infusion. Breast US performed with response to therapy noted.\par Also c/b hyperglycemia (A1C 8.0) - initiated home glucose monitoring and PMD follow up.\par \par CMF #1 with neulasta onpro started  without event; 4 cycles completed.\par \par b/l mastectomy 19 (Dr. Robertson): R breast: IDC, mod diff, 5.1mm (residual disease ranging in size from 1-2mm in tumor bed, approx 10-20% viable cells in 1.9x1.5x1.0cm tumor bed post-neoadjuvant treatment), DCIS grade 2-3 (4mm, 5/14 blocks), 2/4 sentinel nodes with ITCs on permanent section only. Margins negative\par [id number](i+)\par ER-SC-HER2 IHC 0 negative, PDL <1%.\par \par She completed post-mastectomy RT c/b radiation dermatitis now resolved.\par She started adjuvant xeloda for residual disease (2000mg PO BID 2 weeks on, 1 week off) in Oct 2019.\par \par Pertinent History:\par No family history of breast or ovarian cancer.\par HTN - saw Cardiologist 2018, stress test normal per patient, Dr. Samaniego [facility], sees him annually. BP well controlled generally, now following with Dr. Yadav Oncocardiology\par HLD \par Single kidney (kidney donor to brother in ) - baseline Cr 1.2-1.3 though patient notes no prior renal issues\par Transaminitis AST 43, ALT 54 (18), no prior history, resolved since beginning treatment\par BCC of skin removed\par Hysterectomy for fibroid uterus\par Cscope last ~5 years ago, no polyps per patient\par PMD Lance Lafleur\par Single, lives with her son Richard. She has a daughter as well who works at [employer]. She is a retired RN.  [Treatment Protocol] : Treatment Protocol [FreeTextEntry1] : Neoadjuvant Adriamycin 60mg/m2 + Cytoxan 600mg/m2 every 14 days x 4 cycles with Neulasta OnPro support (Day 1), taxol 80mg/m2 x 1, followed by CMF x 4 [de-identified] : Patient presents today for follow up. During second week (Day 11-12 of treatment) of this second cycle of therapy she noted skin lesions/blotches worsened - used topical steroids per Dr. Mujica with some mild improvement (though still visible). She notes skin pain and thickening/cracking of balls of feet and fingertips for the last week and a half, using argan oil constantly through the day. She also notes 3-4 loose BMs per day for the last few days despite being on "off week" - improving. No other current complaints or issues. She is eating well and staying hydrated.

## 2019-12-20 NOTE — ASSESSMENT
[FreeTextEntry1] : 75yo woman with history of HTN on multiple medications, kidney donor/CKD, ER-HI-HER2-, lymph-node positive right breast cancer anatomic stage IIB, AJCC 8th edition clinical prognostic stage IIIB, on neoadjuvant chemotherapy (dose-dense AC-->T) presenting for follow up. Course c/b neuropathy and a new rash after taxol, infusion reactions to both taxol and taxotere very quickly into infusion initiation. CT chest for cough 4/2019 with stable 3mm pulm nodules, decreased size of breast mass and axillary LN; US 4/17 with decrease in size of breast mass and axillary LN. She completed 4 cycles of CMF with OnPro q2 weeks. 7/11/19 b/l mastectomy with residual disease, negative margins (T1bN0(i+)Mx). S/p adjuvant radiation and started on adjuvant Xeloda (2000mg PO BID, 2 weeks on, 1 week off). \par \par Breast Cancer: neuropathy resolved, tolerated CMF without infusion reactions, s/p mastectomy as above. skin rash with treatment s/p biopsy benign\par -hold C2 xeloda for now given toxicities; will plan to resume in 1 week at lower dose pending resolution of toxicities and labwork today\par -advised udder cream TID to hands/feet\par -adequate hydration, monitoring for worsening bowel movements\par -check CBC/CMP today, port flushes, check labs with each cycle of xeloda\par -follow up surgeon - patient asking for referral for surgical revision, has appointment\par -follow up radiation oncology as recommended\par -patient's best contact home number: [phone], [phone]\par -follow up with me in the next few weeks, patient to call me next week before beginning C3 xeloda for dosing instructions\par \par Abdominal nodule: nonspecific, superficial. patient had similar nodule in groin recently that resolved (related to hair follicle v. LN v. prior skin rash) - unchanged\par -US soft tissue c/w small lipoma\par -continue to monitor, advised patient if any change in size/shape to report/evaluate ASAP\par \par Hyperglycemia: in the setting of steroids, likely underlying insulin resistance/DMII undiagnosed. steroids now discontinued (though some given with each CMF dose), FS 100s-150s consistently now\par -continue metformin\par -hydration encouraged, continue to monitor\par -patient will continue to check FS glucose at home\par -follow up PMD, sees regularly\par \par MRI Brain findings: stable, nonspecific thalamic enhancement on repeat brain MRI\par -reviewed with Dr. Jacobs - continue treatment/breast cancer management, follow up MRI Brain stable, every 6 months recommended follow up with him, next MRI planned 3/2020\par \par Anemia: Hgb 8, normocytic, suspect AC related, improved to >10 now\par -continue to monitor as recovers, asymptomatic\par \par HTN: \par -follow up Dr. Yadav with repeat TTE planned in near future \par -continue new antihypertensives, coreg, and home BP monitoring per her recommendations (BP well controlled currently)\par  [Curative] : Goals of care discussed with patient: Curative

## 2019-12-20 NOTE — PHYSICAL EXAM
[Fully active, able to carry on all pre-disease performance without restriction] : Status 0 - Fully active, able to carry on all pre-disease performance without restriction [Normal] : affect appropriate [de-identified] : no masses or adenopathy palpable b/l, excess skin noted at b/l mastectomy sites with lateral dog earring and scar tissue [de-identified] : ~1cm round nodule superficial/subcutaneous mid abdomen, nontender, unchanged [de-identified] : very faint small round red raised lesions, non tender, few on UEs, 1-2 on each leg noted. cracking and erythema/desquamation noted b/l fingertips, balls of both feet

## 2019-12-26 ENCOUNTER — APPOINTMENT (OUTPATIENT)
Dept: DERMATOLOGY | Facility: CLINIC | Age: 74
End: 2019-12-26
Payer: MEDICARE

## 2019-12-26 ENCOUNTER — LABORATORY RESULT (OUTPATIENT)
Age: 74
End: 2019-12-26

## 2019-12-26 DIAGNOSIS — D48.5 NEOPLASM OF UNCERTAIN BEHAVIOR OF SKIN: ICD-10-CM

## 2019-12-26 PROCEDURE — 11302 SHAVE SKIN LESION 1.1-2.0 CM: CPT

## 2020-01-06 ENCOUNTER — APPOINTMENT (OUTPATIENT)
Dept: PLASTIC SURGERY | Facility: CLINIC | Age: 75
End: 2020-01-06
Payer: MEDICARE

## 2020-01-06 VITALS — WEIGHT: 189 LBS | HEIGHT: 65 IN | BODY MASS INDEX: 31.49 KG/M2

## 2020-01-06 DIAGNOSIS — Z86.39 PERSONAL HISTORY OF OTHER ENDOCRINE, NUTRITIONAL AND METABOLIC DISEASE: ICD-10-CM

## 2020-01-06 DIAGNOSIS — Z90.13 ACQUIRED ABSENCE OF BILATERAL BREASTS AND NIPPLES: ICD-10-CM

## 2020-01-06 PROCEDURE — 99024 POSTOP FOLLOW-UP VISIT: CPT

## 2020-01-06 PROCEDURE — 99201 OFFICE OUTPATIENT NEW 10 MINUTES: CPT

## 2020-01-06 NOTE — PHYSICAL EXAM
[NI] : Normal [de-identified] : left chest port [de-identified] : s/p bilateral mastectomy, excess tissue laterally L>R, indentations, contour irregularities, and tissue excess centrally, right chest s/p radiation

## 2020-01-06 NOTE — HISTORY OF PRESENT ILLNESS
[FreeTextEntry1] : 75 yo female presents for a breast reconstruction consultation.  The patient states she was diagnosed with right breast cancer in 2018.  She underwent chemotherapy and then bilateral mastectomy with  no reconstruction in 2019.  She then had radiation treatment to her right breast that she states was completed in October.  She is unhappy with the excess skin and tissue and scars following her mastectomies.  \par \par The patient has history of DM, HTN, elevated cholesterol, and is a kidney donor.  The patient has additional surgical history of hysterectomy and .  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

## 2020-01-14 ENCOUNTER — OUTPATIENT (OUTPATIENT)
Dept: OUTPATIENT SERVICES | Facility: HOSPITAL | Age: 75
LOS: 1 days | Discharge: HOME | End: 2020-01-14

## 2020-01-14 DIAGNOSIS — Z90.711 ACQUIRED ABSENCE OF UTERUS WITH REMAINING CERVICAL STUMP: Chronic | ICD-10-CM

## 2020-01-14 DIAGNOSIS — Z92.21 PERSONAL HISTORY OF ANTINEOPLASTIC CHEMOTHERAPY: Chronic | ICD-10-CM

## 2020-01-14 DIAGNOSIS — Z90.5 ACQUIRED ABSENCE OF KIDNEY: Chronic | ICD-10-CM

## 2020-01-14 DIAGNOSIS — C50.919 MALIGNANT NEOPLASM OF UNSPECIFIED SITE OF UNSPECIFIED FEMALE BREAST: ICD-10-CM

## 2020-01-14 DIAGNOSIS — Z98.891 HISTORY OF UTERINE SCAR FROM PREVIOUS SURGERY: Chronic | ICD-10-CM

## 2020-01-16 ENCOUNTER — RESULT REVIEW (OUTPATIENT)
Age: 75
End: 2020-01-16

## 2020-01-16 ENCOUNTER — APPOINTMENT (OUTPATIENT)
Dept: INFUSION THERAPY | Facility: HOSPITAL | Age: 75
End: 2020-01-16

## 2020-01-16 ENCOUNTER — LABORATORY RESULT (OUTPATIENT)
Age: 75
End: 2020-01-16

## 2020-01-16 ENCOUNTER — APPOINTMENT (OUTPATIENT)
Dept: HEMATOLOGY ONCOLOGY | Facility: CLINIC | Age: 75
End: 2020-01-16
Payer: MEDICARE

## 2020-01-16 VITALS
RESPIRATION RATE: 16 BRPM | DIASTOLIC BLOOD PRESSURE: 78 MMHG | OXYGEN SATURATION: 98 % | TEMPERATURE: 98.2 F | HEART RATE: 82 BPM | WEIGHT: 194.45 LBS | BODY MASS INDEX: 32.36 KG/M2 | SYSTOLIC BLOOD PRESSURE: 125 MMHG

## 2020-01-16 LAB
BASOPHILS # BLD AUTO: 0 K/UL — SIGNIFICANT CHANGE UP (ref 0–0.2)
BASOPHILS NFR BLD AUTO: 0 % — SIGNIFICANT CHANGE UP (ref 0–2)
EOSINOPHIL # BLD AUTO: 0.2 K/UL — SIGNIFICANT CHANGE UP (ref 0–0.5)
EOSINOPHIL NFR BLD AUTO: 3.4 % — SIGNIFICANT CHANGE UP (ref 0–6)
HCT VFR BLD CALC: 29.5 % — LOW (ref 34.5–45)
HGB BLD-MCNC: 10.6 G/DL — LOW (ref 11.5–15.5)
LYMPHOCYTES # BLD AUTO: 0.8 K/UL — LOW (ref 1–3.3)
LYMPHOCYTES # BLD AUTO: 14.1 % — SIGNIFICANT CHANGE UP (ref 13–44)
MCHC RBC-ENTMCNC: 34.7 PG — HIGH (ref 27–34)
MCHC RBC-ENTMCNC: 35.8 G/DL — SIGNIFICANT CHANGE UP (ref 32–36)
MCV RBC AUTO: 96.9 FL — SIGNIFICANT CHANGE UP (ref 80–100)
MONOCYTES # BLD AUTO: 0.4 K/UL — SIGNIFICANT CHANGE UP (ref 0–0.9)
MONOCYTES NFR BLD AUTO: 7 % — SIGNIFICANT CHANGE UP (ref 2–14)
NEUTROPHILS # BLD AUTO: 4.4 K/UL — SIGNIFICANT CHANGE UP (ref 1.8–7.4)
NEUTROPHILS NFR BLD AUTO: 75.4 % — SIGNIFICANT CHANGE UP (ref 43–77)
PLATELET # BLD AUTO: 190 K/UL — SIGNIFICANT CHANGE UP (ref 150–400)
RBC # BLD: 3.04 M/UL — LOW (ref 3.8–5.2)
RBC # FLD: 17.1 % — HIGH (ref 10.3–14.5)
WBC # BLD: 5.8 K/UL — SIGNIFICANT CHANGE UP (ref 3.8–10.5)
WBC # FLD AUTO: 5.8 K/UL — SIGNIFICANT CHANGE UP (ref 3.8–10.5)

## 2020-01-16 PROCEDURE — 99214 OFFICE O/P EST MOD 30 MIN: CPT

## 2020-01-24 ENCOUNTER — APPOINTMENT (OUTPATIENT)
Dept: SURGICAL ONCOLOGY | Facility: CLINIC | Age: 75
End: 2020-01-24
Payer: MEDICARE

## 2020-01-24 VITALS
HEIGHT: 65 IN | DIASTOLIC BLOOD PRESSURE: 63 MMHG | SYSTOLIC BLOOD PRESSURE: 105 MMHG | WEIGHT: 194 LBS | RESPIRATION RATE: 18 BRPM | BODY MASS INDEX: 32.32 KG/M2 | HEART RATE: 86 BPM

## 2020-01-24 PROCEDURE — 99214 OFFICE O/P EST MOD 30 MIN: CPT

## 2020-01-24 NOTE — ADDENDUM
[FreeTextEntry1] : I, Mathew Saleem, acted soley as a scribe for Dr. Reese Kingsley on 01/24/2020\par

## 2020-01-24 NOTE — ASSESSMENT
[FreeTextEntry1] : Imp:\par S/p bilateral mastectomy, excess skin and fat noted at b/l mastectomy sites at both lateral portions. RT breast radiated. \par \par Plan:\par Will discuss resection at a later time (tentatively end of March/beginning of April), removal of Mediport with plastics closure.

## 2020-01-24 NOTE — PHYSICAL EXAM
[Normal] : supple, no neck mass and thyroid not enlarged [Normal Neck Lymph Nodes] : normal neck lymph nodes  [Normal Groin Lymph Nodes] : normal groin lymph nodes [Normal] : oriented to person, place and time, with appropriate affect [FreeTextEntry1] : I, Mathew Saleem, was present for the physical exam.\par  [de-identified] : Normal S1,S2. Regular rate and rhythm [de-identified] : S/p bilateral mastectomy, excess skin and fat noted at b/l mastectomy sites both lateral portions. RT breast radiated [de-identified] : Clear breath sounds bilaterally, normal respiratory effort\par \par

## 2020-01-24 NOTE — HISTORY OF PRESENT ILLNESS
[de-identified] : 75 y/o female presents for a f/u visit for breast cancer. \par \par Patient was dx with RT breast cancer (invasive moderately differentiated ductal CA) in 2018. She underwent chemotherapy and then bilateral mastectomy with no reconstruction in 2019. She then had radiation treatment to her right breast that she states was completed in 2019. She is unhappy with the excess skin and tissue and scars following her mastectomies. She has previously seen Dr. Souza of plastics and . She is now under the care of . \par \par PMHx:\par HTN, DM, HLD, kidney donor, hysterectomy, . \par \par Today, on 2020, the pt c/o of chest wall pain and excess fat/tissue.  Denies nipple discharge, skin changes. Denies constitutional symptoms.

## 2020-01-28 ENCOUNTER — APPOINTMENT (OUTPATIENT)
Dept: SURGICAL ONCOLOGY | Facility: CLINIC | Age: 75
End: 2020-01-28

## 2020-01-28 NOTE — HISTORY OF PRESENT ILLNESS
[Disease: _____________________] : Disease: [unfilled] [N: ___] : N[unfilled] [T: ___] : T[unfilled] [AJCC Stage: ____] : AJCC Stage: [unfilled] [Treatment Protocol] : Treatment Protocol [de-identified] : Fanny is presenting for medical oncology follow up.\par \par Last mammogram . She has no history of abnormal breast imaging, breast biopsies, or surgeries.\par \par She saw her PMD who sent her for a screening mammogram after she palpated a mass on the R side of her chest 2018. She had been having body aches/soreness and feeling general malaise since November, despite a course of antibiotics (just after flu shot administered). Also some R shoulder/arm pains.\par \par 18 Mammogram screenin.7cm irregular spiculated mass upper outer right breast 8cm FN with associated architectural distortion and several associated microcalcifications. Partially included enlarged right axillary lymph nodes. BIRADS 0\par \par 18 bilateral breast US: R breast 10:00 7cm FN 2.6cm irregular hypoechoic mass (biopsy recommended). Inferior right axillary lymph node with focal area of eccentric cortical thickening (recommend US guided biopsy). 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 Michael 7/9. 1.4cm involved, DCIS cribiform pattern with high grade nuclear atypia and apocrine cytology very focally present. (coil shaped clip) R axillary lymph node: metastatic ductal carcinoma with apocrine cytology involving a lymph node (hydromark marking clip) ER 0% SC 0% HER2 IHC 0 negative\par \par She saw Dr. Demetrio Robertson yesterday who recommended medical oncology consultation for neoadjuvant chemotherapy. \par \par She noted on intial visit with me some occasional body aches still and fatigue over the last month. She notes an intermittent tongue sensation - like a scald from food (though she does not recall an episode of this). Symptoms intermittent for months. Otherwise she feels well. She is active at baseline, uses stationary bike at home most days for exercise. \par \par 19 PET/CT: Superolateral R breast and R axillary lymph nodes FDG-avid. S/p left nephrectomy. Few subcm b/l pulmonary nodules are nonspecific. Please correlate clinically for infection and follow up with CT chest in 1 month.\par \par TTE 19: EF 64%, GLS -20 Wall motion abnormality in inferolateral wall reported, EKG stable. Patient seen by Dr. Lorrie Yadav (Cardiology) - TTE reviewed without significant wall motion abnormalities, antihypertensives changed \par \par MRI Brain w/wo contrast 19: L maxillary polyp v. retention cyst. Non enhancing area of abnormal T2 prolongation involving left thalamic region, nonspecific. Stable on repeat 2019.\par \par CT Chest 19: No interval changes since 19. Stable 3.3x2.1cm UOQ R breast, 2x1.2cm R axillary lymph node. Stable 4mm and smaller scattered indeterminant pulmonary nodules.\par \par She started neoadjuvant chemotherapy with AC on 19. Taxol #1 3/27.\par Taxol #2 with grade 3 infusion reaction, vasculitic rash development. Taxol reattempt with similar. Taxotere attempt  with chest discomfort, acute worsening of rash/itching, severe back pain radiating into the chest, a few minutes into infusion. Breast US performed with response to therapy noted.\par Also c/b hyperglycemia (A1C 8.0) - initiated home glucose monitoring and PMD follow up.\par \par CMF #1 with neulasta onpro started  without event; 4 cycles completed.\par \par b/l mastectomy 19 (Dr. Robertson): R breast: IDC, mod diff, 5.1mm (residual disease ranging in size from 1-2mm in tumor bed, approx 10-20% viable cells in 1.9x1.5x1.0cm tumor bed post-neoadjuvant treatment), DCIS grade 2-3 (4mm, 5/14 blocks), 2/4 sentinel nodes with ITCs on permanent section only. Margins negative\par [id number](i+)\par ER-SC-HER2 IHC 0 negative, PDL <1%.\par \par She completed post-mastectomy RT c/b radiation dermatitis now resolved.\par She started adjuvant xeloda for residual disease (2000mg PO BID 2 weeks on, 1 week off) in Oct 2019.\par \par Pertinent History:\par No family history of breast or ovarian cancer.\par HTN - saw Cardiologist 2018, stress test normal per patient, Dr. Samaniego [facility], sees him annually. BP well controlled generally, now following with Dr. Yadav Oncocardiology\par HLD \par Single kidney (kidney donor to brother in ) - baseline Cr 1.2-1.3 though patient notes no prior renal issues\par Transaminitis AST 43, ALT 54 (18), no prior history, resolved since beginning treatment\par BCC of skin removed\par Hysterectomy for fibroid uterus\par Cscope last ~5 years ago, no polyps per patient\par PMD Lance Lafleur\par Single, lives with her son Richard. She has a daughter as well who works at [employer]. She is a retired RN.  [de-identified] : ER-MO-HER2- [FreeTextEntry1] : Neoadjuvant Adriamycin 60mg/m2 + Cytoxan 600mg/m2 every 14 days x 4 cycles with Neulasta OnPro support (Day 1), taxol 80mg/m2 x 1, followed by CMF x 4 [de-identified] : Patient presents today for follow up. 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 Mujica in lieu of repeat resection/Mohs. She decreased xeloda dose as prescribed. Skin cracking/dryness much improved/resolved - using cream BID. BMs daily and regular now. No other current complaints or issues. She is eating well and staying hydrated.

## 2020-01-28 NOTE — ASSESSMENT
[Curative] : Goals of care discussed with patient: Curative [FreeTextEntry1] : 73yo woman with history of HTN on multiple medications, kidney donor/CKD, ER-GA-HER2-, lymph-node positive right breast cancer anatomic stage IIB, AJCC 8th edition clinical prognostic stage IIIB, on neoadjuvant chemotherapy (dose-dense AC-->T) presenting for follow up. Course c/b neuropathy and a new rash after taxol, infusion reactions to both taxol and taxotere very quickly into infusion initiation. CT chest for cough 4/2019 with stable 3mm pulm nodules, decreased size of breast mass and axillary LN; US 4/17 with decrease in size of breast mass and axillary LN. She completed 4 cycles of CMF with OnPro q2 weeks. 7/11/19 b/l mastectomy with residual disease, negative margins (T1bN0(i+)Mx). S/p adjuvant radiation and started on adjuvant Xeloda (2000mg PO BID, 2 weeks on, 1 week off). \par \par Breast Cancer: neuropathy resolved, tolerated CMF without infusion reactions, s/p mastectomy as above. skin rash with treatment s/p biopsy benign, C1 xeloda with skin cracking, g2 diarrhea, decreased dose\par -continue with C3 xeloda 1500mg PO BID 2 weeks on/1 off\par -advised continue udder cream TID to hands/feet\par -adequate hydration, monitoring for worsening bowel movements\par -check CBC/CMP today, port flushes, check labs with each cycle of xeloda\par -follow up surgeon, plastics - scheduled\par -follow up radiation oncology as recommended\par -patient's best contact home number: [phone], [phone]\par -follow up with me in 6 weeks, sooner if issues arise\par \par Abdominal nodule: nonspecific, superficial. patient had similar nodule in groin recently that resolved (related to hair follicle v. LN v. prior skin rash) - unchanged\par -US soft tissue c/w small lipoma\par -continue to monitor, advised patient if any change in size/shape to report/evaluate ASAP\par \par Hyperglycemia: in the setting of steroids, likely underlying insulin resistance/DMII undiagnosed. steroids now discontinued (though some given with each CMF dose), FS 100s-150s consistently now\par -continue metformin\par -hydration encouraged, continue to monitor\par -patient will continue to check FS glucose at home\par -follow up PMD, sees regularly\par \par MRI Brain findings: stable, nonspecific thalamic enhancement on repeat brain MRI\par -reviewed with Dr. Jacobs - continue treatment/breast cancer management, follow up MRI Brain stable, every 6 months recommended follow up with him, next MRI planned 3/2020\par \par Anemia: Hgb 8, normocytic, suspect AC related, improved to >10 now\par -continue to monitor as recovers, asymptomatic\par \par HTN: \par -follow up Dr. Yadav with repeat TTE planned in near future \par -continue new antihypertensives, coreg, and home BP monitoring per her recommendations (BP well controlled currently)\par

## 2020-01-28 NOTE — PHYSICAL EXAM
[Fully active, able to carry on all pre-disease performance without restriction] : Status 0 - Fully active, able to carry on all pre-disease performance without restriction [Normal] : affect appropriate [de-identified] : no masses or adenopathy palpable b/l, excess skin noted at b/l mastectomy sites with lateral dog earring and scar tissue [de-identified] : ~1cm round nodule superficial/subcutaneous mid abdomen, nontender, unchanged

## 2020-02-04 ENCOUNTER — RX RENEWAL (OUTPATIENT)
Age: 75
End: 2020-02-04

## 2020-02-11 ENCOUNTER — RESULT REVIEW (OUTPATIENT)
Age: 75
End: 2020-02-11

## 2020-02-11 ENCOUNTER — LABORATORY RESULT (OUTPATIENT)
Age: 75
End: 2020-02-11

## 2020-02-11 ENCOUNTER — APPOINTMENT (OUTPATIENT)
Dept: INFUSION THERAPY | Facility: HOSPITAL | Age: 75
End: 2020-02-11

## 2020-02-11 LAB
BASOPHILS # BLD AUTO: 0 K/UL — SIGNIFICANT CHANGE UP (ref 0–0.2)
BASOPHILS NFR BLD AUTO: 0.2 % — SIGNIFICANT CHANGE UP (ref 0–2)
EOSINOPHIL # BLD AUTO: 0.1 K/UL — SIGNIFICANT CHANGE UP (ref 0–0.5)
EOSINOPHIL NFR BLD AUTO: 2.3 % — SIGNIFICANT CHANGE UP (ref 0–6)
HCT VFR BLD CALC: 31.2 % — LOW (ref 34.5–45)
HGB BLD-MCNC: 11.1 G/DL — LOW (ref 11.5–15.5)
LYMPHOCYTES # BLD AUTO: 0.6 K/UL — LOW (ref 1–3.3)
LYMPHOCYTES # BLD AUTO: 12.7 % — LOW (ref 13–44)
MCHC RBC-ENTMCNC: 35.5 G/DL — SIGNIFICANT CHANGE UP (ref 32–36)
MCHC RBC-ENTMCNC: 35.8 PG — HIGH (ref 27–34)
MCV RBC AUTO: 101 FL — HIGH (ref 80–100)
MONOCYTES # BLD AUTO: 0.4 K/UL — SIGNIFICANT CHANGE UP (ref 0–0.9)
MONOCYTES NFR BLD AUTO: 7.4 % — SIGNIFICANT CHANGE UP (ref 2–14)
NEUTROPHILS # BLD AUTO: 3.9 K/UL — SIGNIFICANT CHANGE UP (ref 1.8–7.4)
NEUTROPHILS NFR BLD AUTO: 77.4 % — HIGH (ref 43–77)
PLATELET # BLD AUTO: 154 K/UL — SIGNIFICANT CHANGE UP (ref 150–400)
RBC # BLD: 3.1 M/UL — LOW (ref 3.8–5.2)
RBC # FLD: 16.2 % — HIGH (ref 10.3–14.5)
WBC # BLD: 5 K/UL — SIGNIFICANT CHANGE UP (ref 3.8–10.5)
WBC # FLD AUTO: 5 K/UL — SIGNIFICANT CHANGE UP (ref 3.8–10.5)

## 2020-02-19 ENCOUNTER — APPOINTMENT (OUTPATIENT)
Dept: DERMATOLOGY | Facility: CLINIC | Age: 75
End: 2020-02-19
Payer: MEDICARE

## 2020-02-19 ENCOUNTER — OUTPATIENT (OUTPATIENT)
Dept: OUTPATIENT SERVICES | Facility: HOSPITAL | Age: 75
LOS: 1 days | End: 2020-02-19
Payer: MEDICARE

## 2020-02-19 ENCOUNTER — NON-APPOINTMENT (OUTPATIENT)
Age: 75
End: 2020-02-19

## 2020-02-19 ENCOUNTER — APPOINTMENT (OUTPATIENT)
Dept: CARDIOLOGY | Facility: CLINIC | Age: 75
End: 2020-02-19
Payer: MEDICARE

## 2020-02-19 ENCOUNTER — APPOINTMENT (OUTPATIENT)
Dept: CV DIAGNOSITCS | Facility: HOSPITAL | Age: 75
End: 2020-02-19

## 2020-02-19 VITALS
SYSTOLIC BLOOD PRESSURE: 115 MMHG | DIASTOLIC BLOOD PRESSURE: 72 MMHG | HEART RATE: 82 BPM | WEIGHT: 189 LBS | HEIGHT: 65 IN | BODY MASS INDEX: 31.49 KG/M2 | OXYGEN SATURATION: 96 %

## 2020-02-19 DIAGNOSIS — I25.10 ATHEROSCLEROTIC HEART DISEASE OF NATIVE CORONARY ARTERY WITHOUT ANGINA PECTORIS: ICD-10-CM

## 2020-02-19 DIAGNOSIS — Z92.21 PERSONAL HISTORY OF ANTINEOPLASTIC CHEMOTHERAPY: Chronic | ICD-10-CM

## 2020-02-19 DIAGNOSIS — Z90.5 ACQUIRED ABSENCE OF KIDNEY: Chronic | ICD-10-CM

## 2020-02-19 DIAGNOSIS — Z90.711 ACQUIRED ABSENCE OF UTERUS WITH REMAINING CERVICAL STUMP: Chronic | ICD-10-CM

## 2020-02-19 DIAGNOSIS — Z98.891 HISTORY OF UTERINE SCAR FROM PREVIOUS SURGERY: Chronic | ICD-10-CM

## 2020-02-19 PROCEDURE — 93000 ELECTROCARDIOGRAM COMPLETE: CPT

## 2020-02-19 PROCEDURE — 99214 OFFICE O/P EST MOD 30 MIN: CPT

## 2020-02-19 PROCEDURE — 17313 MOHS 1 STAGE T/A/L: CPT

## 2020-02-19 PROCEDURE — 93306 TTE W/DOPPLER COMPLETE: CPT | Mod: 26

## 2020-02-19 PROCEDURE — 93306 TTE W/DOPPLER COMPLETE: CPT

## 2020-02-19 PROCEDURE — 93356 MYOCRD STRAIN IMG SPCKL TRCK: CPT

## 2020-02-19 PROCEDURE — 12032 INTMD RPR S/A/T/EXT 2.6-7.5: CPT

## 2020-02-25 ENCOUNTER — OUTPATIENT (OUTPATIENT)
Dept: OUTPATIENT SERVICES | Facility: HOSPITAL | Age: 75
LOS: 1 days | Discharge: HOME | End: 2020-02-25

## 2020-02-25 DIAGNOSIS — Z92.21 PERSONAL HISTORY OF ANTINEOPLASTIC CHEMOTHERAPY: Chronic | ICD-10-CM

## 2020-02-25 DIAGNOSIS — Z90.5 ACQUIRED ABSENCE OF KIDNEY: Chronic | ICD-10-CM

## 2020-02-25 DIAGNOSIS — C50.919 MALIGNANT NEOPLASM OF UNSPECIFIED SITE OF UNSPECIFIED FEMALE BREAST: ICD-10-CM

## 2020-02-25 DIAGNOSIS — Z98.891 HISTORY OF UTERINE SCAR FROM PREVIOUS SURGERY: Chronic | ICD-10-CM

## 2020-02-25 DIAGNOSIS — Z90.711 ACQUIRED ABSENCE OF UTERUS WITH REMAINING CERVICAL STUMP: Chronic | ICD-10-CM

## 2020-02-26 ENCOUNTER — APPOINTMENT (OUTPATIENT)
Dept: CV DIAGNOSITCS | Facility: HOSPITAL | Age: 75
End: 2020-02-26

## 2020-03-03 ENCOUNTER — RESULT REVIEW (OUTPATIENT)
Age: 75
End: 2020-03-03

## 2020-03-03 ENCOUNTER — APPOINTMENT (OUTPATIENT)
Dept: HEMATOLOGY ONCOLOGY | Facility: CLINIC | Age: 75
End: 2020-03-03
Payer: MEDICARE

## 2020-03-03 ENCOUNTER — APPOINTMENT (OUTPATIENT)
Dept: INFUSION THERAPY | Facility: HOSPITAL | Age: 75
End: 2020-03-03

## 2020-03-03 ENCOUNTER — LABORATORY RESULT (OUTPATIENT)
Age: 75
End: 2020-03-03

## 2020-03-03 VITALS
WEIGHT: 196.21 LBS | DIASTOLIC BLOOD PRESSURE: 70 MMHG | BODY MASS INDEX: 32.65 KG/M2 | HEART RATE: 93 BPM | TEMPERATURE: 98 F | SYSTOLIC BLOOD PRESSURE: 118 MMHG | OXYGEN SATURATION: 99 % | RESPIRATION RATE: 16 BRPM

## 2020-03-03 LAB
BASOPHILS # BLD AUTO: 0.02 K/UL — SIGNIFICANT CHANGE UP (ref 0–0.2)
BASOPHILS NFR BLD AUTO: 0.5 % — SIGNIFICANT CHANGE UP (ref 0–2)
EOSINOPHIL # BLD AUTO: 0.1 K/UL — SIGNIFICANT CHANGE UP (ref 0–0.5)
EOSINOPHIL NFR BLD AUTO: 3 % — SIGNIFICANT CHANGE UP (ref 0–6)
HCT VFR BLD CALC: 31.3 % — LOW (ref 34.5–45)
HGB BLD-MCNC: 10.9 G/DL — LOW (ref 11.5–15.5)
LYMPHOCYTES # BLD AUTO: 0.65 K/UL — LOW (ref 1–3.3)
LYMPHOCYTES # BLD AUTO: 19.4 % — SIGNIFICANT CHANGE UP (ref 13–44)
MCHC RBC-ENTMCNC: 34.8 G/DL — SIGNIFICANT CHANGE UP (ref 32–36)
MCHC RBC-ENTMCNC: 35.7 PG — HIGH (ref 27–34)
MCV RBC AUTO: 103 FL — HIGH (ref 80–100)
MONOCYTES # BLD AUTO: 0.4 K/UL — SIGNIFICANT CHANGE UP (ref 0–0.9)
MONOCYTES NFR BLD AUTO: 11.9 % — SIGNIFICANT CHANGE UP (ref 2–14)
NEUTROPHILS # BLD AUTO: 2.17 K/UL — SIGNIFICANT CHANGE UP (ref 1.8–7.4)
NEUTROPHILS NFR BLD AUTO: 65.2 % — SIGNIFICANT CHANGE UP (ref 43–77)
PLATELET # BLD AUTO: 144 K/UL — LOW (ref 150–400)
RBC # BLD: 3.04 M/UL — LOW (ref 3.8–5.2)
RBC # FLD: 15.2 % — HIGH (ref 10.3–14.5)
WBC # BLD: 3.33 K/UL — LOW (ref 3.8–10.5)
WBC # FLD AUTO: 3.33 K/UL — LOW (ref 3.8–10.5)

## 2020-03-03 PROCEDURE — 99214 OFFICE O/P EST MOD 30 MIN: CPT

## 2020-03-04 LAB — NRBC # BLD: 0 /100 WBCS — SIGNIFICANT CHANGE UP (ref 0–0)

## 2020-03-04 NOTE — HISTORY OF PRESENT ILLNESS
[Disease: _____________________] : Disease: [unfilled] [T: ___] : T[unfilled] [N: ___] : N[unfilled] [AJCC Stage: ____] : AJCC Stage: [unfilled] [Treatment Protocol] : Treatment Protocol [de-identified] : ER-[place]-HER2- [de-identified] : Fanny is presenting for medical oncology follow up.\par \par Last mammogram . She has no history of abnormal breast imaging, breast biopsies, or surgeries.\par \par She saw her PMD who sent her for a screening mammogram after she palpated a mass on the R side of her chest 2018. She had been having body aches/soreness and feeling general malaise since November, despite a course of antibiotics (just after flu shot administered). Also some R shoulder/arm pains.\par \par 18 Mammogram screenin.7cm irregular spiculated mass upper outer right breast 8cm FN with associated architectural distortion and several associated microcalcifications. Partially included enlarged right axillary lymph nodes. BIRADS 0\par \par 18 bilateral breast US: R breast 10:00 7cm FN 2.6cm irregular hypoechoic mass (biopsy recommended). Inferior right axillary lymph node with focal area of eccentric cortical thickening (recommend US guided biopsy). 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 [place] 7/9. 1.4cm involved, DCIS cribiform pattern with high grade nuclear atypia and apocrine cytology very focally present. (coil shaped clip) R axillary lymph node: metastatic ductal carcinoma with apocrine cytology involving a lymph node (hydromark marking clip) ER 0% MS 0% HER2 IHC 0 negative\par \par She saw Dr. Demetrio Robertson yesterday who recommended medical oncology consultation for neoadjuvant chemotherapy. \par \par She noted on intial visit with me some occasional body aches still and fatigue over the last month. She notes an intermittent tongue sensation - like a scald from food (though she does not recall an episode of this). Symptoms intermittent for months. Otherwise she feels well. She is active at baseline, uses stationary bike at home most days for exercise. \par \par 19 PET/CT: Superolateral R breast and R axillary lymph nodes FDG-avid. S/p left nephrectomy. Few subcm b/l pulmonary nodules are nonspecific. Please correlate clinically for infection and follow up with CT chest in 1 month.\par \par TTE 19: EF 64%, GLS -20 Wall motion abnormality in inferolateral wall reported, EKG stable. Patient seen by Dr. Lorrie Yadav (Cardiology) - TTE reviewed without significant wall motion abnormalities, antihypertensives changed \par \par MRI Brain w/wo contrast 19: L maxillary polyp v. retention cyst. Non enhancing area of abnormal T2 prolongation involving left thalamic region, nonspecific. Stable on repeat 2019.\par \par CT Chest 19: No interval changes since 19. Stable 3.3x2.1cm UOQ R breast, 2x1.2cm R axillary lymph node. Stable 4mm and smaller scattered indeterminant pulmonary nodules.\par \par She started neoadjuvant chemotherapy with AC on 19. Taxol #1 3/27.\par Taxol #2 with grade 3 infusion reaction, vasculitic rash development. Taxol reattempt with similar. Taxotere attempt  with chest discomfort, acute worsening of rash/itching, severe back pain radiating into the chest, a few minutes into infusion. Breast US performed with response to therapy noted.\par Also c/b hyperglycemia (A1C 8.0) - initiated home glucose monitoring and PMD follow up.\par \par CMF #1 with neulasta onpro started  without event; 4 cycles completed.\par \par b/l mastectomy 19 (Dr. Robertson): R breast: IDC, mod diff, 5.1mm (residual disease ranging in size from 1-2mm in tumor bed, approx 10-20% viable cells in 1.9x1.5x1.0cm tumor bed post-neoadjuvant treatment), DCIS grade 2-3 (4mm, 5/14 blocks), 2/4 sentinel nodes with ITCs on permanent section only. Margins negative\par [id number](i+)\par ER-MS-HER2 IHC 0 negative, PDL <1%.\par \par She completed post-mastectomy RT c/b radiation dermatitis now resolved.\par She started adjuvant xeloda for residual disease (2000mg PO BID 2 weeks on, 1 week off) in Oct 2019.\par \par Pertinent History:\par No family history of breast or ovarian cancer.\par HTN - saw Cardiologist 2018, stress test normal per patient, Dr. Samaniego [facility], sees him annually. BP well controlled generally, now following with Dr. Yadav Oncocardiology\par HLD \par Single kidney (kidney donor to brother in ) - baseline Cr 1.2-1.3 though patient notes no prior renal issues\par Transaminitis AST 43, ALT 54 (18), no prior history, resolved since beginning treatment\par BCC of skin removed\par Hysterectomy for fibroid uterus\par Cscope last ~5 years ago, no polyps per patient\par PMD Lance Lafleur\par Single, lives with her son Richard. She has a daughter as well who works at [employer]. She is a retired RN.  [de-identified] : Patient presents today for follow up. She had Mohs surgery for superficially invasive focal SCC on right forearm on 2/19/2020 by Dr. Hale. She started cycle 6 day 1 of capecitabine on 3/1. In off week, she feels better but while she is on capecitabine, her appetite is down and has sometimes loose stool every now and then. She has grade 1 HFS which has been stable since dose reduction after cycle 1, resolved completely during off-week. Using cream for HFS. Denies fever/chills, N/V now but has nausea intermittently while on treatment\par \par TTE 2/19/2020 - EF (Visual Estimate): 60-65 % [FreeTextEntry1] : Neoadjuvant Adriamycin 60mg/m2 + Cytoxan 600mg/m2 every 14 days x 4 cycles with Neulasta OnPro support (Day 1), taxol 80mg/m2 x 1, followed by CMF x 4

## 2020-03-04 NOTE — PHYSICAL EXAM
[Restricted in physically strenuous activity but ambulatory and able to carry out work of a light or sedentary nature] : Status 1- Restricted in physically strenuous activity but ambulatory and able to carry out work of a light or sedentary nature, e.g., light house work, office work [Normal] : affect appropriate [de-identified] : No cervical, supra- or sub-clavicular LNs.  [de-identified] : small palpable nodule (unchanged lipomatous) [de-identified] : no masses or adenopathy palpable b/l, excess skin noted at b/l mastectomy sites with lateral dog earring and scar tissue. discoloration of skin below rightr breast, no discharge, erythema + tenderness [de-identified] : surgical scar in right forearm with slight skin retraction. No tenderness, discharge, or swelling

## 2020-03-04 NOTE — ASSESSMENT
[Curative] : Goals of care discussed with patient: Curative [FreeTextEntry1] : 73yo woman with history of HTN on multiple medications, kidney donor/CKD, ER-[place]-HER2-, lymph-node positive right breast cancer anatomic stage IIB, AJCC 8th edition clinical prognostic stage IIIB, on neoadjuvant chemotherapy (dose-dense AC-->T) presenting for follow up. Course c/b neuropathy and a new rash after taxol, infusion reactions to both taxol and taxotere very quickly into infusion initiation. CT chest for cough 4/2019 with stable 3mm pulm nodules, decreased size of breast mass and axillary LN; US 4/17 with decrease in size of breast mass and axillary LN. She completed 4 cycles of CMF with OnPro q2 weeks. 7/11/19 b/l mastectomy with residual disease, negative margins (T1bN0(i+)Mx). S/p adjuvant radiation and started on adjuvant Xeloda (2000mg PO BID, 2 weeks on, 1 week off).  Dose reduced after cycle 1 due to G2 diarrhea and HFS\par \par Breast Cancer: neuropathy resolved, tolerated CMF without infusion reactions, s/p mastectomy as above. skin rash with treatment s/p biopsy benign, C1 xeloda with skin cracking, g2 diarrhea, decreased dose\par -continue with C6 xeloda 1500mg PO BID 2 weeks on/1 off (C6D1 3/1)\par -advised continue udder cream TID to hands/feet\par -adequate hydration, monitoring for worsening bowel movements\par -follow up surgeon, plastics\par -follow up radiation oncology\par -patient's best contact home number: [phone], [phone]\par -follow up on 4/9/2020, discussed goal 8 cycles per CREATEX study, patient will continue with close follow up of side effects, call if issues arise\par -Check labs and port flush today\par \par Abdominal nodule: nonspecific, superficial. patient had similar nodule in groin recently that resolved (related to hair follicle v. LN v. prior skin rash) - unchanged\par -US soft tissue c/w small lipoma\par -continue to monitor, advised patient if any change in size/shape to report/evaluate ASAP\par \par Hyperglycemia: in the setting of steroids, likely underlying insulin resistance/DMII undiagnosed. steroids now discontinued (though some given with each CMF dose), FS 100s-150s consistently now\par -continue metformin\par -hydration encouraged, continue to monitor\par -patient will continue to check FS glucose at home\par -follow up PMD, sees regularly\par \par MRI Brain findings: stable, nonspecific thalamic enhancement on repeat brain MRI\par -reviewed with Dr. Jacobs - continue treatment/breast cancer management, follow up MRI Brain stable, every 6 months recommended follow up with him, next MRI planned 3/2020. Not done yet as of today\par \par Anemia: Hgb 8, normocytic in the past, suspect AC related, improved to >10. 11.1 in 2/2020, now on xeloda likely contributing\par -continue to monitor as recovers, asymptomatic\par \par HTN: \par -follow up Dr. Yadav\par -Echo on 2/19/2020 - normal EF\par -continue antihypertensives, coreg, Losartan-HCTZ\par \par HCM\par MMG - 9/2019 BIRAD2. Due in 9/2020.\par DEXA - She never had one. Not interested in it\par Colonoscopy - Had one within 10 years ago but does not remember when it was done and how it was. Recommended to talk to PMD\par Pap smear - s/p CASSIE in 1985\par

## 2020-03-07 NOTE — HISTORY OF PRESENT ILLNESS
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 \par radiation treatment for 25 cycles to be completed feb 22. 2020\par Xeloda twice daily for two weeks and one week off treatment in rotation.\par S/P bilateral mastectomy without reconstruction.   \par \par Interval Note February 19, 2020.\par on capecitabine\par Blood pressure today is well controlled. EKG demonstrating sinus rhythm. She offers no complaints of chest pain, shortness of breath or palpitations.\par \par scheduled for a MOHS procedure with Dr.Jessica Johnson  to excise an atypical intradermal melanocytic proliferation on her right forearm. \par \par Repeat echo was performed today.

## 2020-03-07 NOTE — PHYSICAL EXAM
[Well Groomed] : well groomed [Normal Appearance] : normal appearance [General Appearance - Well Developed] : well developed [General Appearance - Well Nourished] : well nourished [No Deformities] : no deformities [Eyelids - No Xanthelasma] : the eyelids demonstrated no xanthelasmas [General Appearance - In No Acute Distress] : no acute distress [Normal Conjunctiva] : the conjunctiva exhibited no abnormalities [Normal Oral Mucosa] : normal oral mucosa [No Oral Pallor] : no oral pallor [No Oral Cyanosis] : no oral cyanosis [Normal Jugular Venous A Waves Present] : normal jugular venous A waves present [Normal Jugular Venous V Waves Present] : normal jugular venous V waves present [No Jugular Venous Lopez A Waves] : no jugular venous lopez A waves [Respiration, Rhythm And Depth] : normal respiratory rhythm and effort [Exaggerated Use Of Accessory Muscles For Inspiration] : no accessory muscle use [Auscultation Breath Sounds / Voice Sounds] : lungs were clear to auscultation bilaterally [Normal] : normal [Normal Rate] : normal [Rhythm Regular] : regular [Normal S1] : normal S1 [2+] : left 2+ [No Pitting Edema] : no pitting edema present [Abdomen Soft] : soft [Abdomen Tenderness] : non-tender [] : no hepato-splenomegaly [Abdomen Mass (___ Cm)] : no abdominal mass palpated [Gait - Sufficient For Exercise Testing] : the gait was sufficient for exercise testing [Abnormal Walk] : normal gait [FreeTextEntry1] : peripheral neuropathy

## 2020-03-08 ENCOUNTER — RX RENEWAL (OUTPATIENT)
Age: 75
End: 2020-03-08

## 2020-03-09 ENCOUNTER — RX RENEWAL (OUTPATIENT)
Age: 75
End: 2020-03-09

## 2020-03-18 ENCOUNTER — APPOINTMENT (OUTPATIENT)
Dept: MRI IMAGING | Facility: IMAGING CENTER | Age: 75
End: 2020-03-18

## 2020-03-18 ENCOUNTER — APPOINTMENT (OUTPATIENT)
Dept: NEUROLOGY | Facility: CLINIC | Age: 75
End: 2020-03-18

## 2020-03-22 ENCOUNTER — RX RENEWAL (OUTPATIENT)
Age: 75
End: 2020-03-22

## 2020-04-03 ENCOUNTER — OUTPATIENT (OUTPATIENT)
Dept: OUTPATIENT SERVICES | Facility: HOSPITAL | Age: 75
LOS: 1 days | Discharge: HOME | End: 2020-04-03

## 2020-04-03 DIAGNOSIS — Z90.711 ACQUIRED ABSENCE OF UTERUS WITH REMAINING CERVICAL STUMP: Chronic | ICD-10-CM

## 2020-04-03 DIAGNOSIS — Z90.5 ACQUIRED ABSENCE OF KIDNEY: Chronic | ICD-10-CM

## 2020-04-03 DIAGNOSIS — Z98.891 HISTORY OF UTERINE SCAR FROM PREVIOUS SURGERY: Chronic | ICD-10-CM

## 2020-04-03 DIAGNOSIS — Z92.21 PERSONAL HISTORY OF ANTINEOPLASTIC CHEMOTHERAPY: Chronic | ICD-10-CM

## 2020-04-03 DIAGNOSIS — C50.919 MALIGNANT NEOPLASM OF UNSPECIFIED SITE OF UNSPECIFIED FEMALE BREAST: ICD-10-CM

## 2020-04-09 ENCOUNTER — APPOINTMENT (OUTPATIENT)
Dept: INFUSION THERAPY | Facility: HOSPITAL | Age: 75
End: 2020-04-09

## 2020-04-09 ENCOUNTER — APPOINTMENT (OUTPATIENT)
Dept: HEMATOLOGY ONCOLOGY | Facility: CLINIC | Age: 75
End: 2020-04-09
Payer: MEDICARE

## 2020-04-09 PROCEDURE — 99441: CPT

## 2020-04-09 RX ORDER — CAPECITABINE 500 MG/1
500 TABLET ORAL TWICE DAILY
Qty: 126 | Refills: 2 | Status: DISCONTINUED | COMMUNITY
Start: 2019-10-23 | End: 2020-04-09

## 2020-04-09 RX ORDER — BETAMETHASONE DIPROPIONATE 0.5 MG/G
0.05 CREAM, AUGMENTED TOPICAL
Qty: 50 | Refills: 0 | Status: DISCONTINUED | COMMUNITY
Start: 2019-11-13 | End: 2020-04-09

## 2020-04-09 RX ORDER — METOCLOPRAMIDE 10 MG/1
10 TABLET ORAL EVERY 8 HOURS
Qty: 30 | Refills: 0 | Status: DISCONTINUED | COMMUNITY
Start: 2020-03-03 | End: 2020-04-09

## 2020-04-09 NOTE — ASSESSMENT
[FreeTextEntry1] : 75yo woman with history of HTN on multiple medications, kidney donor/CKD, ER-[place]-HER2-, lymph-node positive right breast cancer anatomic stage IIB, AJCC 8th edition clinical prognostic stage IIIB, on neoadjuvant chemotherapy (dose-dense AC-->T) presenting for follow up. Course c/b neuropathy and a new rash after taxol, infusion reactions to both taxol and taxotere very quickly into infusion initiation. CT chest for cough 4/2019 with stable 3mm pulm nodules, decreased size of breast mass and axillary LN; US 4/17 with decrease in size of breast mass and axillary LN. She completed 4 cycles of CMF with OnPro q2 weeks. 7/11/19 b/l mastectomy with residual disease, negative margins (T1bN0(i+)Mx). S/p adjuvant radiation and started on adjuvant Xeloda (2000mg PO BID, 2 weeks on, 1 week off).  Dose reduced after cycle 1 due to G2 diarrhea and HFS\par \par Breast Cancer: neuropathy resolved, tolerated CMF without infusion reactions, s/p mastectomy as above. skin rash with treatment s/p biopsy benign, C1 xeloda with skin cracking, g2 diarrhea, decreased dose, now completed  6.5 cycles, held in light of COVID19 pandemic with high risk given close + contacts, though she remains asymptomatic, doing well, tested and negative\par -continue to hold xeloda indefinitely, risks outweigh benefits currently, extensive discussion with patient today regarding this and she agreed, expressed understanding\par -adequate hydration, monitoring bowel movements\par -follow up surgeon, plastics\par -follow up radiation oncology\par -patient's best contact home number: [phone], [phone]\par -follow up telehealth 4/23 scheduled\par -Check labs and port flush in near future when safe, deferred today (given positive household members who remain asymptomatic will not send home lab)\par \par Abdominal nodule: nonspecific, superficial. patient had similar nodule in groin recently that resolved (related to hair follicle v. LN v. prior skin rash) - unchanged per patient\par -US soft tissue c/w small lipoma\par -continue to monitor, advised patient if any change in size/shape to report/evaluate ASAP\par \par Hyperglycemia: in the setting of steroids, likely underlying insulin resistance/DMII undiagnosed. steroids now discontinued (though some given with each CMF dose), FS 100s-150s consistently now\par -continue metformin\par -hydration encouraged, continue to monitor\par -patient will continue to check FS glucose at home\par -follow up PMD, sees regularly\par \par MRI Brain findings: stable, nonspecific thalamic enhancement on repeat brain MRI\par -reviewed with Dr. Jacobs - continue treatment/breast cancer management, follow up MRI Brain stable, every 6 months recommended follow up with him, next MRI planned 3/2020 but cancelled in setting of COVID19 pandemic. Not done yet as of today - patient to reschedule hopefully May 2020 \par \par Anemia: Hgb 8, normocytic in the past, suspect AC related, improved to >10. 11.1 in 2/2020, now on xeloda likely contributing\par -continue to monitor as recovers, asymptomatic, recheck soon\par \par HTN: \par -follow up Dr. Yadav\par -Echo on 2/19/2020 - normal EF\par -continue antihypertensives, coreg, Losartan-HCTZ\par \par HCM\par MMG - 9/2019 BIRAD2, s/p b/l mastectomy\par DEXA - She never had one. Not interested in it\par Colonoscopy - Had one within 10 years ago but does not remember when it was done and how it was. Recommended to talk to PMD in near future\par Pap smear - s/p CASSIE in 1985, declines further follow up\par \par 9:30-9:40am total time on phone visit with patient. [Curative] : Goals of care discussed with patient: Curative

## 2020-04-09 NOTE — HISTORY OF PRESENT ILLNESS
[Home] : at home, [unfilled] , at the time of the visit. [Other Location: e.g. Home (Enter Location, City,State)___] : at [unfilled] [Patient] : the patient [Self] : self [Disease: _____________________] : Disease: [unfilled] [T: ___] : T[unfilled] [N: ___] : N[unfilled] [AJCC Stage: ____] : AJCC Stage: [unfilled] [de-identified] : Last mammogram . She has no history of abnormal breast imaging, breast biopsies, or surgeries.\par \par She saw her PMD who sent her for a screening mammogram after she palpated a mass on the R side of her chest 2018. She had been having body aches/soreness and feeling general malaise since November, despite a course of antibiotics (just after flu shot administered). Also some R shoulder/arm pains.\par \par 18 Mammogram screenin.7cm irregular spiculated mass upper outer right breast 8cm FN with associated architectural distortion and several associated microcalcifications. Partially included enlarged right axillary lymph nodes. BIRADS 0\par \par 18 bilateral breast US: R breast 10:00 7cm FN 2.6cm irregular hypoechoic mass (biopsy recommended). Inferior right axillary lymph node with focal area of eccentric cortical thickening (recommend US guided biopsy). 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 [place] 7/9. 1.4cm involved, DCIS cribiform pattern with high grade nuclear atypia and apocrine cytology very focally present. (coil shaped clip) R axillary lymph node: metastatic ductal carcinoma with apocrine cytology involving a lymph node (hydromark marking clip) ER 0% VA 0% HER2 IHC 0 negative\par \par She saw Dr. Demetrio Robertson yesterday who recommended medical oncology consultation for neoadjuvant chemotherapy. \par \par She noted on intial visit with me some occasional body aches still and fatigue over the last month. She notes an intermittent tongue sensation - like a scald from food (though she does not recall an episode of this). Symptoms intermittent for months. Otherwise she feels well. She is active at baseline, uses stationary bike at home most days for exercise. \par \par 19 PET/CT: Superolateral R breast and R axillary lymph nodes FDG-avid. S/p left nephrectomy. Few subcm b/l pulmonary nodules are nonspecific. Please correlate clinically for infection and follow up with CT chest in 1 month.\par \par TTE 19: EF 64%, GLS -20 Wall motion abnormality in inferolateral wall reported, EKG stable. Patient seen by Dr. Lorrie Yadav (Cardiology) - TTE reviewed without significant wall motion abnormalities, antihypertensives changed \par \par MRI Brain w/wo contrast 19: L maxillary polyp v. retention cyst. Non enhancing area of abnormal T2 prolongation involving left thalamic region, nonspecific. Stable on repeat 2019. Following with Dr. Jacobs.\par \par CT Chest 19: No interval changes since 19. Stable 3.3x2.1cm UOQ R breast, 2x1.2cm R axillary lymph node. Stable 4mm and smaller scattered indeterminant pulmonary nodules.\par \par She started neoadjuvant chemotherapy with AC on 19. Taxol #1 3/27.\par Taxol #2 with grade 3 infusion reaction, vasculitic rash development. Taxol reattempt with similar. Taxotere attempt  with chest discomfort, acute worsening of rash/itching, severe back pain radiating into the chest, a few minutes into infusion. Breast US performed with response to therapy noted.\par Also c/b hyperglycemia (A1C 8.0) - initiated home glucose monitoring and PMD follow up.\par \par CMF #1 with neulasta onpro started  without event; 4 cycles completed.\par \par b/l mastectomy 19 (Dr. Robertson): R breast: IDC, mod diff, 5.1mm (residual disease ranging in size from 1-2mm in tumor bed, approx 10-20% viable cells in 1.9x1.5x1.0cm tumor bed post-neoadjuvant treatment), DCIS grade 2-3 (4mm, 5/14 blocks), 2/4 sentinel nodes with ITCs on permanent section only. Margins negative\par [id number](i+)\par ER-[place]-HER2 IHC 0 negative, PDL <1%.\par \par She completed post-mastectomy RT c/b radiation dermatitis now resolved.\par She started adjuvant xeloda for residual disease (2000mg PO BID 2 weeks on, 1 week off) in Oct 2019.\par \par  Mohs surgery for superficially invasive focal SCC on right forearm on 2020 by Dr. Hale.\par TTE 2020 - EF (Visual Estimate): 60-65 %\par \par Pertinent History:\par No family history of breast or ovarian cancer.\par HTN - saw Cardiologist 2018, stress test normal per patient, Dr. Samaniego [facility], sees him annually. BP well controlled generally, now following with Dr. Yadav Oncocardiology\par HLD \par Single kidney (kidney donor to brother in ) - baseline Cr 1.2-1.3 though patient notes no prior renal issues\par Transaminitis AST 43, ALT 54 (18), no prior history, resolved since beginning treatment\par BCC of skin removed\par Hysterectomy for fibroid uterus\par Cscope last ~5 years ago, no polyps per patient\par PMD Lnace Lafleur\par Single, lives with her son Richard. She has a daughter as well who works at [employer]. She is a retired RN.  [de-identified] : ER-[place]-HER2- [de-identified] : Telehealth visit conducted with patient (cell [phone]). She is staying with her mother and sister currently. Mother and sister covid+ 2 weeks ago - they are both home and symptomatic but improving slowly. She was tested last week and negative. She is Isolating in her bedroom upstairs and they remain downstairs and isolated. She stays mostly upstairs except on rare trips to the grocery store where she wears a gloves and mask, limits trips and times, maintains appropriate distances, and washes hands (and feet) frequently when at home. She stopped the Xeloda almost 2 weeks ago in setting of family members developing symptoms and being tested (after phone discussion with me). At that time she had just completed week 1 of cycle 7. She has no symptoms currently. She notes this week she has felt a little fatigued, poor appetite which she attributes to staying home and in her room all day, but eating well and hydrating frequently (had ice cream and milk shake for breakfast today).

## 2020-04-23 ENCOUNTER — APPOINTMENT (OUTPATIENT)
Dept: HEMATOLOGY ONCOLOGY | Facility: CLINIC | Age: 75
End: 2020-04-23
Payer: MEDICARE

## 2020-04-23 PROCEDURE — 99441: CPT

## 2020-04-24 NOTE — HISTORY OF PRESENT ILLNESS
[Disease: _____________________] : Disease: [unfilled] [T: ___] : T[unfilled] [N: ___] : N[unfilled] [AJCC Stage: ____] : AJCC Stage: [unfilled] [Verbal consent obtained from patient] : the patient, [unfilled] [Home] : at home, [unfilled] , at the time of the visit. [Other Location: e.g. Home (Enter Location, City,State)___] : at [unfilled] [Patient] : the patient [Self] : self [de-identified] : Last mammogram . She has no history of abnormal breast imaging, breast biopsies, or surgeries.\par \par She saw her PMD who sent her for a screening mammogram after she palpated a mass on the R side of her chest 2018. She had been having body aches/soreness and feeling general malaise since November, despite a course of antibiotics (just after flu shot administered). Also some R shoulder/arm pains.\par \par 18 Mammogram screenin.7cm irregular spiculated mass upper outer right breast 8cm FN with associated architectural distortion and several associated microcalcifications. Partially included enlarged right axillary lymph nodes. BIRADS 0\par \par 18 bilateral breast US: R breast 10:00 7cm FN 2.6cm irregular hypoechoic mass (biopsy recommended). Inferior right axillary lymph node with focal area of eccentric cortical thickening (recommend US guided biopsy). 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 [place] 7/9. 1.4cm involved, DCIS cribiform pattern with high grade nuclear atypia and apocrine cytology very focally present. (coil shaped clip) R axillary lymph node: metastatic ductal carcinoma with apocrine cytology involving a lymph node (hydromark marking clip) ER 0% TN 0% HER2 IHC 0 negative\par \par She saw Dr. Demetrio Robertson yesterday who recommended medical oncology consultation for neoadjuvant chemotherapy. \par \par She noted on intial visit with me some occasional body aches still and fatigue over the last month. She notes an intermittent tongue sensation - like a scald from food (though she does not recall an episode of this). Symptoms intermittent for months. Otherwise she feels well. She is active at baseline, uses stationary bike at home most days for exercise. \par \par 19 PET/CT: Superolateral R breast and R axillary lymph nodes FDG-avid. S/p left nephrectomy. Few subcm b/l pulmonary nodules are nonspecific. Please correlate clinically for infection and follow up with CT chest in 1 month.\par \par TTE 19: EF 64%, GLS -20 Wall motion abnormality in inferolateral wall reported, EKG stable. Patient seen by Dr. Lorrie Yadav (Cardiology) - TTE reviewed without significant wall motion abnormalities, antihypertensives changed \par \par MRI Brain w/wo contrast 19: L maxillary polyp v. retention cyst. Non enhancing area of abnormal T2 prolongation involving left thalamic region, nonspecific. Stable on repeat 2019. Following with Dr. Jacobs.\par \par CT Chest 19: No interval changes since 19. Stable 3.3x2.1cm UOQ R breast, 2x1.2cm R axillary lymph node. Stable 4mm and smaller scattered indeterminant pulmonary nodules.\par \par She started neoadjuvant chemotherapy with AC on 19. Taxol #1 3/27.\par Taxol #2 with grade 3 infusion reaction, vasculitic rash development. Taxol reattempt with similar. Taxotere attempt  with chest discomfort, acute worsening of rash/itching, severe back pain radiating into the chest, a few minutes into infusion. Breast US performed with response to therapy noted.\par Also c/b hyperglycemia (A1C 8.0) - initiated home glucose monitoring and PMD follow up.\par \par CMF #1 with neulasta onpro started  without event; 4 cycles completed.\par \par b/l mastectomy 19 (Dr. Robertson): R breast: IDC, mod diff, 5.1mm (residual disease ranging in size from 1-2mm in tumor bed, approx 10-20% viable cells in 1.9x1.5x1.0cm tumor bed post-neoadjuvant treatment), DCIS grade 2-3 (4mm, 5/14 blocks), 2/4 sentinel nodes with ITCs on permanent section only. Margins negative\par [id number](i+)\par ER-TN-HER2 IHC 0 negative, PDL <1%.\par \par She completed post-mastectomy RT c/b radiation dermatitis now resolved.\par She started adjuvant xeloda for residual disease (2000mg PO BID 2 weeks on, 1 week off) in Oct 2019.\par \par  Mohs surgery for superficially invasive focal SCC on right forearm on 2020 by Dr. Hale.\par TTE 2020 - EF (Visual Estimate): 60-65 %\par \par Pertinent History:\par No family history of breast or ovarian cancer.\par HTN - saw Cardiologist 2018, stress test normal per patient, Dr. Samaniego [facility], sees him annually. BP well controlled generally, now following with Dr. Yadav Oncocardiology\par HLD \par Single kidney (kidney donor to brother in ) - baseline Cr 1.2-1.3 though patient notes no prior renal issues\par Transaminitis AST 43, ALT 54 (18), no prior history, resolved since beginning treatment\par BCC of skin removed\par Hysterectomy for fibroid uterus\par Cscope last ~5 years ago, no polyps per patient\par PMD Lance Lafleur\par Single, lives with her son Richard. She has a daughter as well who works at [employer]. She is a retired RN.  [de-identified] : ER-NM-HER2- [de-identified] : Telehealth visit conducted with patient (cell [phone]). She is staying with her mother and sister currently. Mother and sister covid+ 2 weeks ago - they are both home and symptomatic - sister improving but her mother is somewhat worse - they are supporting her as best they can at home as she is in her 90s. Patient remains asymptomatic, feeling "great" resuming some exercise and walks. She is Isolating in her bedroom upstairs and they remain downstairs and isolated. She stays mostly upstairs except on rare trips to the grocery store where she wears a gloves and mask, limits trips and times, maintains appropriate distances, and washes hands (and feet) frequently when at home. She continues to hold xeloda.

## 2020-04-24 NOTE — ASSESSMENT
[Curative] : Goals of care discussed with patient: Curative [FreeTextEntry1] : 73yo woman with history of HTN on multiple medications, kidney donor/CKD, ER-RI-HER2-, lymph-node positive right breast cancer anatomic stage IIB, AJCC 8th edition clinical prognostic stage IIIB, on neoadjuvant chemotherapy (dose-dense AC-->T) presenting for follow up. Course c/b neuropathy and a new rash after taxol, infusion reactions to both taxol and taxotere very quickly into infusion initiation. CT chest for cough 4/2019 with stable 3mm pulm nodules, decreased size of breast mass and axillary LN; US 4/17 with decrease in size of breast mass and axillary LN. She completed 4 cycles of CMF with OnPro q2 weeks. 7/11/19 b/l mastectomy with residual disease, negative margins (T1bN0(i+)Mx). S/p adjuvant radiation and started on adjuvant Xeloda (2000mg PO BID, 2 weeks on, 1 week off).  Dose reduced after cycle 1 due to G2 diarrhea and HFS\par \par Breast Cancer: neuropathy resolved, tolerated CMF without infusion reactions, s/p mastectomy as above. skin rash with treatment s/p biopsy benign, C1 xeloda with skin cracking, g2 diarrhea, decreased dose, now completed  6.5 cycles, held in light of COVID19 pandemic with high risk given close + contacts, though she remains asymptomatic, doing well, tested and negative\par -continue to hold xeloda indefinitely, risks outweigh benefits currently, extensive discussion with patient today regarding this and she agreed, expressed understanding\par -adequate hydration, monitoring bowel movements\par -follow up surgeon, plastics\par -follow up radiation oncology\par -patient's best contact home number: [phone], [phone]\par -follow up telehealth in 2 weeks, sooner if issues arise\par -Check labs and port flush in near future when safe, deferred today again (given positive household members who remain asymptomatic will not send home lab)\par \par MRI Brain findings: stable, nonspecific thalamic enhancement on repeat brain MRI\par -reviewed with Dr. Jacobs - continue treatment/breast cancer management, follow up MRI Brain stable, every 6 months recommended follow up with him, next MRI planned 3/2020 but cancelled in setting of COVID19 pandemic. Not done yet as of today - patient to reschedule hopefully May 2020 \par \par HTN: \par -follow up Dr. Yadav\par -Echo on 2/19/2020 - normal EF\par -continue antihypertensives, coreg, Losartan-HCTZ\par \par HCM\par MMG - 9/2019 BIRAD2, s/p b/l mastectomy\par DEXA - She never had one. Not interested in it\par Colonoscopy - Had one within 10 years ago but does not remember when it was done and how it was. Recommended to talk to PMD in near future\par Pap smear - s/p CASSIE in 1985, declines further follow up

## 2020-04-29 ENCOUNTER — APPOINTMENT (OUTPATIENT)
Dept: HEMATOLOGY ONCOLOGY | Facility: CLINIC | Age: 75
End: 2020-04-29
Payer: MEDICARE

## 2020-04-29 PROCEDURE — 99441: CPT

## 2020-05-01 ENCOUNTER — OUTPATIENT (OUTPATIENT)
Dept: OUTPATIENT SERVICES | Facility: HOSPITAL | Age: 75
LOS: 1 days | Discharge: HOME | End: 2020-05-01

## 2020-05-01 DIAGNOSIS — Z92.21 PERSONAL HISTORY OF ANTINEOPLASTIC CHEMOTHERAPY: Chronic | ICD-10-CM

## 2020-05-01 DIAGNOSIS — Z90.711 ACQUIRED ABSENCE OF UTERUS WITH REMAINING CERVICAL STUMP: Chronic | ICD-10-CM

## 2020-05-01 DIAGNOSIS — Z98.891 HISTORY OF UTERINE SCAR FROM PREVIOUS SURGERY: Chronic | ICD-10-CM

## 2020-05-01 DIAGNOSIS — C50.919 MALIGNANT NEOPLASM OF UNSPECIFIED SITE OF UNSPECIFIED FEMALE BREAST: ICD-10-CM

## 2020-05-01 DIAGNOSIS — Z90.5 ACQUIRED ABSENCE OF KIDNEY: Chronic | ICD-10-CM

## 2020-05-07 ENCOUNTER — APPOINTMENT (OUTPATIENT)
Dept: HEMATOLOGY ONCOLOGY | Facility: CLINIC | Age: 75
End: 2020-05-07
Payer: MEDICARE

## 2020-05-07 PROCEDURE — 99442: CPT

## 2020-05-21 ENCOUNTER — APPOINTMENT (OUTPATIENT)
Dept: HEMATOLOGY ONCOLOGY | Facility: CLINIC | Age: 75
End: 2020-05-21
Payer: MEDICARE

## 2020-05-21 PROCEDURE — 99441: CPT

## 2020-05-21 NOTE — REASON FOR VISIT
[Other Location: e.g. School (Enter Location, City,State)___] : at [unfilled], at the time of the visit. [Follow-Up Visit] : a follow-up [Medical Office: ([facility])___] : at the medical office located in  [FreeTextEntry2] : Breast Cancer [Verbal consent obtained from patient] : the patient, [unfilled]

## 2020-05-21 NOTE — HISTORY OF PRESENT ILLNESS
[Disease: _____________________] : Disease: [unfilled] [N: ___] : N[unfilled] [T: ___] : T[unfilled] [AJCC Stage: ____] : AJCC Stage: [unfilled] [de-identified] : Last mammogram . She has no history of abnormal breast imaging, breast biopsies, or surgeries.\par \par She saw her PMD who sent her for a screening mammogram after she palpated a mass on the R side of her chest 2018. She had been having body aches/soreness and feeling general malaise since November, despite a course of antibiotics (just after flu shot administered). Also some R shoulder/arm pains.\par \par 18 Mammogram screenin.7cm irregular spiculated mass upper outer right breast 8cm FN with associated architectural distortion and several associated microcalcifications. Partially included enlarged right axillary lymph nodes. BIRADS 0\par \par 18 bilateral breast US: R breast 10:00 7cm FN 2.6cm irregular hypoechoic mass (biopsy recommended). Inferior right axillary lymph node with focal area of eccentric cortical thickening (recommend US guided biopsy). 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 [place] 7/9. 1.4cm involved, DCIS cribiform pattern with high grade nuclear atypia and apocrine cytology very focally present. (coil shaped clip) R axillary lymph node: metastatic ductal carcinoma with apocrine cytology involving a lymph node (hydromark marking clip) ER 0% MT 0% HER2 IHC 0 negative\par \par She saw Dr. Demetrio Robertson yesterday who recommended medical oncology consultation for neoadjuvant chemotherapy. \par \par She noted on intial visit with me some occasional body aches still and fatigue over the last month. She notes an intermittent tongue sensation - like a scald from food (though she does not recall an episode of this). Symptoms intermittent for months. Otherwise she feels well. She is active at baseline, uses stationary bike at home most days for exercise. \par \par 19 PET/CT: Superolateral R breast and R axillary lymph nodes FDG-avid. S/p left nephrectomy. Few subcm b/l pulmonary nodules are nonspecific. Please correlate clinically for infection and follow up with CT chest in 1 month.\par \par TTE 19: EF 64%, GLS -20 Wall motion abnormality in inferolateral wall reported, EKG stable. Patient seen by Dr. Lorrie Yadav (Cardiology) - TTE reviewed without significant wall motion abnormalities, antihypertensives changed \par \par MRI Brain w/wo contrast 19: L maxillary polyp v. retention cyst. Non enhancing area of abnormal T2 prolongation involving left thalamic region, nonspecific. Stable on repeat 2019. Following with Dr. Jacobs.\par \par CT Chest 19: No interval changes since 19. Stable 3.3x2.1cm UOQ R breast, 2x1.2cm R axillary lymph node. Stable 4mm and smaller scattered indeterminant pulmonary nodules.\par \par She started neoadjuvant chemotherapy with AC on 19. Taxol #1 3/27.\par Taxol #2 with grade 3 infusion reaction, vasculitic rash development. Taxol reattempt with similar. Taxotere attempt  with chest discomfort, acute worsening of rash/itching, severe back pain radiating into the chest, a few minutes into infusion. Breast US performed with response to therapy noted.\par Also c/b hyperglycemia (A1C 8.0) - initiated home glucose monitoring and PMD follow up.\par \par CMF #1 with neulasta onpro started  without event; 4 cycles completed.\par \par b/l mastectomy 19 (Dr. Robertson): R breast: IDC, mod diff, 5.1mm (residual disease ranging in size from 1-2mm in tumor bed, approx 10-20% viable cells in 1.9x1.5x1.0cm tumor bed post-neoadjuvant treatment), DCIS grade 2-3 (4mm, 5/14 blocks), 2/4 sentinel nodes with ITCs on permanent section only. Margins negative\par [id number](i+)\par ER-MT-HER2 IHC 0 negative, PDL <1%.\par \par She completed post-mastectomy RT c/b radiation dermatitis now resolved.\par She started adjuvant xeloda for residual disease (2000mg PO BID 2 weeks on, 1 week off) in Oct 2019.\par \par  Mohs surgery for superficially invasive focal SCC on right forearm on 2020 by Dr. Hale.\par TTE 2020 - EF (Visual Estimate): 60-65 %\par \par Pertinent History:\par No family history of breast or ovarian cancer.\par HTN - saw Cardiologist 2018, stress test normal per patient, Dr. Samaniego [facility], sees him annually. BP well controlled generally, now following with Dr. Yadav Oncocardiology\par HLD \par Single kidney (kidney donor to brother in ) - baseline Cr 1.2-1.3 though patient notes no prior renal issues\par Transaminitis AST 43, ALT 54 (18), no prior history, resolved since beginning treatment\par BCC of skin removed\par Hysterectomy for fibroid uterus\par Cscope last ~5 years ago, no polyps per patient\par PMD Lance Lafleur\par Single, lives with her son Richard. She has a daughter as well who works at [employer]. She is a retired RN.  [de-identified] : Telehealth visit conducted with patient (cell [phone]). She is staying with her mother and sister currently. Her mother and sister were COVID postive.  She was retested on 4/29/20 and tested positive for Covid 19 as well as for the antibodies.  The patient states she is asymptomatic.  She has not been re-swabbed yet to see if she is now negative. She feels well and is active, taking walks and working out on her exercise bike.   [de-identified] : ER-SD-HER2-

## 2020-05-21 NOTE — ASSESSMENT
[Curative] : Goals of care discussed with patient: Curative [FreeTextEntry1] : 73yo woman with history of HTN on multiple medications, kidney donor/CKD, ER-NC-HER2-, lymph-node positive right breast cancer anatomic stage IIB, AJCC 8th edition clinical prognostic stage IIIB, on neoadjuvant chemotherapy (dose-dense AC-->T) presenting for follow up. Course c/b neuropathy and a new rash after taxol, infusion reactions to both taxol and taxotere very quickly into infusion initiation. CT chest for cough 4/2019 with stable 3mm pulm nodules, decreased size of breast mass and axillary LN; US 4/17 with decrease in size of breast mass and axillary LN. She completed 4 cycles of CMF with OnPro q2 weeks. 7/11/19 b/l mastectomy with residual disease, negative margins (T1bN0(i+)Mx). S/p adjuvant radiation and started on adjuvant Xeloda (2000mg PO BID, 2 weeks on, 1 week off).  Now completed, doing well.\par \par Breast Cancer: neuropathy resolved, tolerated CMF without infusion reactions, s/p mastectomy as above. skin rash with treatment s/p biopsy benign, C1 xeloda with skin cracking, g2 diarrhea, decreased dose, now completed  6.5 cycles, held in light of COVID19 pandemic with high risk given close + contacts, though she remains asymptomatic, doing well, tested and negative\par -continue to hold xeloda indefinitely, risks outweigh benefits currently, extensive discussion with patient today regarding this and she agreed, expressed understanding\par -adequate hydration, monitoring bowel movements\par -follow up surgeon, plastics\par -follow up radiation oncology\par -patient's best contact home number: [phone], [phone]\par -follow up telehealth in 2-4 weeks, sooner if issues arise\par -Check labs and port flush in near future when safe, deferred today again \par \par MRI Brain findings: stable, nonspecific thalamic enhancement on repeat brain MRI\par -reviewed with Dr. Jacobs - continue treatment/breast cancer management, follow up MRI Brain stable, every 6 months recommended follow up with him, next MRI planned 3/2020 but cancelled in setting of COVID19 pandemic. Not done yet as of today - patient to reschedule hopefully June 2020 \par \par COVID19 infection - now asymptomatic, doing well\par -recheck swab and antibody testing - plans to do at [facility], along with routine labs, visit facilitated\par \par HTN: well controlled at home per patient\par -follow up Dr. Yadav\par -Echo on 2/19/2020 - normal EF - plan for 6mo follow up\par -continue antihypertensives, coreg, Losartan-HCTZ\par \par HCM\par MMG - 9/2019 BIRAD2, s/p b/l mastectomy\par DEXA - She never had one. Not interested in it\par Colonoscopy - Had one within 10 years ago but does not remember when it was done and how it was. Recommended to talk to PMD in near future\par Pap smear - s/p CASSIE in 1985, declines further follow up

## 2020-05-23 ENCOUNTER — NON-APPOINTMENT (OUTPATIENT)
Age: 75
End: 2020-05-23

## 2020-05-23 ENCOUNTER — TRANSCRIPTION ENCOUNTER (OUTPATIENT)
Age: 75
End: 2020-05-23

## 2020-05-25 ENCOUNTER — NON-APPOINTMENT (OUTPATIENT)
Age: 75
End: 2020-05-25

## 2020-06-15 ENCOUNTER — RX RENEWAL (OUTPATIENT)
Age: 75
End: 2020-06-15

## 2020-06-28 ENCOUNTER — APPOINTMENT (OUTPATIENT)
Dept: INTERNAL MEDICINE | Facility: CLINIC | Age: 75
End: 2020-06-28
Payer: MEDICARE

## 2020-06-28 VITALS
HEART RATE: 90 BPM | SYSTOLIC BLOOD PRESSURE: 120 MMHG | DIASTOLIC BLOOD PRESSURE: 70 MMHG | BODY MASS INDEX: 31.16 KG/M2 | WEIGHT: 187 LBS | HEIGHT: 65 IN | RESPIRATION RATE: 16 BRPM

## 2020-06-28 DIAGNOSIS — Z12.11 ENCOUNTER FOR SCREENING FOR MALIGNANT NEOPLASM OF COLON: ICD-10-CM

## 2020-06-28 PROCEDURE — 36415 COLL VENOUS BLD VENIPUNCTURE: CPT

## 2020-06-28 PROCEDURE — 99214 OFFICE O/P EST MOD 30 MIN: CPT | Mod: 25

## 2020-06-28 NOTE — HISTORY OF PRESENT ILLNESS
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 \par Last seen 7/19\par Had tested covid ab positive 4/20\par Had been planning on having repeat reconstructive surgery.  \par Has been followed with cardiology. Has been maintained on current rx. due for f/u in Aug - last seen in Feb\par following with oncology closely.\par Has not been checking FS glucose.  \par Lipids at goal in April.  \par BP has been controlled. \par appetite has been about the same - fair. Has been following better diet.\par some walking for exercise\par Aches have been better.\par with recent rash - resolved. \par \par mammo/sono - Br Ca as noted.\par pap overdue.\par \par ? due for colon cancer screening.  \par Had flu vaccine\par had prevnar

## 2020-06-28 NOTE — PHYSICAL EXAM
[Normal Posterior Cervical Nodes] : no posterior cervical lymphadenopathy [Normal Anterior Cervical Nodes] : no anterior cervical lymphadenopathy [Normal] : affect was normal and insight and judgment were intact [de-identified] : corwingenes

## 2020-06-28 NOTE — REVIEW OF SYSTEMS
[Negative] : Psychiatric [Fever] : no fever [Fatigue] : no fatigue [Chills] : no chills [Hot Flashes] : no hot flashes [Night Sweats] : no night sweats [Recent Change In Weight] : ~T no recent weight change [FreeTextEntry9] : aches improved as noted.  [FreeTextEntry7] : altered taste from the chemo

## 2020-06-28 NOTE — HEALTH RISK ASSESSMENT
[Yes] : Yes [No] : In the past 12 months have you used drugs other than those required for medical reasons? No [2 - 4 times a month (2 pts)] : 2-4 times a month (2 points) [1 or 2 (0 pts)] : 1 or 2 (0 points) [Never (0 pts)] : Never (0 points) [0] : 2) Feeling down, depressed, or hopeless: Not at all (0) [] : No [[id number]] : 0 [Audit-CScore] : 2

## 2020-06-29 LAB
ANION GAP SERPL CALC-SCNC: 16 MMOL/L
BASOPHILS # BLD AUTO: 0.01 K/UL
BASOPHILS NFR BLD AUTO: 0.1 %
BUN SERPL-MCNC: 25 MG/DL
CALCIUM SERPL-MCNC: 9.8 MG/DL
CHLORIDE SERPL-SCNC: 107 MMOL/L
CO2 SERPL-SCNC: 22 MMOL/L
CREAT SERPL-MCNC: 1.43 MG/DL
EOSINOPHIL # BLD AUTO: 0.14 K/UL
EOSINOPHIL NFR BLD AUTO: 2.1 %
ESTIMATED AVERAGE GLUCOSE: 137 MG/DL
GLUCOSE SERPL-MCNC: 115 MG/DL
HBA1C MFR BLD HPLC: 6.4 %
HCT VFR BLD CALC: 39.1 %
HGB BLD-MCNC: 12.1 G/DL
IMM GRANULOCYTES NFR BLD AUTO: 0.3 %
LYMPHOCYTES # BLD AUTO: 0.8 K/UL
LYMPHOCYTES NFR BLD AUTO: 11.8 %
MAN DIFF?: NORMAL
MCHC RBC-ENTMCNC: 30.5 PG
MCHC RBC-ENTMCNC: 30.9 GM/DL
MCV RBC AUTO: 98.5 FL
MONOCYTES # BLD AUTO: 0.42 K/UL
MONOCYTES NFR BLD AUTO: 6.2 %
NEUTROPHILS # BLD AUTO: 5.38 K/UL
NEUTROPHILS NFR BLD AUTO: 79.5 %
PLATELET # BLD AUTO: 240 K/UL
POTASSIUM SERPL-SCNC: 3.8 MMOL/L
RBC # BLD: 3.97 M/UL
RBC # FLD: 12.6 %
SODIUM SERPL-SCNC: 144 MMOL/L
WBC # FLD AUTO: 6.77 K/UL

## 2020-07-09 LAB — HEMOCCULT STL QL IA: NEGATIVE

## 2020-07-14 ENCOUNTER — OUTPATIENT (OUTPATIENT)
Dept: OUTPATIENT SERVICES | Facility: HOSPITAL | Age: 75
LOS: 1 days | Discharge: HOME | End: 2020-07-14

## 2020-07-14 DIAGNOSIS — Z90.711 ACQUIRED ABSENCE OF UTERUS WITH REMAINING CERVICAL STUMP: Chronic | ICD-10-CM

## 2020-07-14 DIAGNOSIS — C50.919 MALIGNANT NEOPLASM OF UNSPECIFIED SITE OF UNSPECIFIED FEMALE BREAST: ICD-10-CM

## 2020-07-14 DIAGNOSIS — Z90.5 ACQUIRED ABSENCE OF KIDNEY: Chronic | ICD-10-CM

## 2020-07-14 DIAGNOSIS — Z98.891 HISTORY OF UTERINE SCAR FROM PREVIOUS SURGERY: Chronic | ICD-10-CM

## 2020-07-14 DIAGNOSIS — Z92.21 PERSONAL HISTORY OF ANTINEOPLASTIC CHEMOTHERAPY: Chronic | ICD-10-CM

## 2020-07-16 ENCOUNTER — LABORATORY RESULT (OUTPATIENT)
Age: 75
End: 2020-07-16

## 2020-07-16 ENCOUNTER — RESULT REVIEW (OUTPATIENT)
Age: 75
End: 2020-07-16

## 2020-07-16 ENCOUNTER — APPOINTMENT (OUTPATIENT)
Dept: INFUSION THERAPY | Facility: HOSPITAL | Age: 75
End: 2020-07-16

## 2020-07-16 ENCOUNTER — RX RENEWAL (OUTPATIENT)
Age: 75
End: 2020-07-16

## 2020-07-16 ENCOUNTER — APPOINTMENT (OUTPATIENT)
Dept: HEMATOLOGY ONCOLOGY | Facility: CLINIC | Age: 75
End: 2020-07-16
Payer: MEDICARE

## 2020-07-16 VITALS
SYSTOLIC BLOOD PRESSURE: 131 MMHG | TEMPERATURE: 98.4 F | WEIGHT: 189.16 LBS | DIASTOLIC BLOOD PRESSURE: 84 MMHG | RESPIRATION RATE: 16 BRPM | OXYGEN SATURATION: 98 % | HEART RATE: 78 BPM | BODY MASS INDEX: 31.48 KG/M2

## 2020-07-16 LAB
BASOPHILS # BLD AUTO: 0.02 K/UL — SIGNIFICANT CHANGE UP (ref 0–0.2)
BASOPHILS NFR BLD AUTO: 0.3 % — SIGNIFICANT CHANGE UP (ref 0–2)
EOSINOPHIL # BLD AUTO: 0.21 K/UL — SIGNIFICANT CHANGE UP (ref 0–0.5)
EOSINOPHIL NFR BLD AUTO: 3.5 % — SIGNIFICANT CHANGE UP (ref 0–6)
HCT VFR BLD CALC: 36.6 % — SIGNIFICANT CHANGE UP (ref 34.5–45)
HGB BLD-MCNC: 12.5 G/DL — SIGNIFICANT CHANGE UP (ref 11.5–15.5)
IMM GRANULOCYTES NFR BLD AUTO: 0.3 % — SIGNIFICANT CHANGE UP (ref 0–1.5)
LYMPHOCYTES # BLD AUTO: 0.86 K/UL — LOW (ref 1–3.3)
LYMPHOCYTES # BLD AUTO: 14.5 % — SIGNIFICANT CHANGE UP (ref 13–44)
MCHC RBC-ENTMCNC: 30.6 PG — SIGNIFICANT CHANGE UP (ref 27–34)
MCHC RBC-ENTMCNC: 34.2 GM/DL — SIGNIFICANT CHANGE UP (ref 32–36)
MCV RBC AUTO: 89.5 FL — SIGNIFICANT CHANGE UP (ref 80–100)
MONOCYTES # BLD AUTO: 0.4 K/UL — SIGNIFICANT CHANGE UP (ref 0–0.9)
MONOCYTES NFR BLD AUTO: 6.7 % — SIGNIFICANT CHANGE UP (ref 2–14)
NEUTROPHILS # BLD AUTO: 4.43 K/UL — SIGNIFICANT CHANGE UP (ref 1.8–7.4)
NEUTROPHILS NFR BLD AUTO: 74.7 % — SIGNIFICANT CHANGE UP (ref 43–77)
NRBC # BLD: 0 /100 WBCS — SIGNIFICANT CHANGE UP (ref 0–0)
PLATELET # BLD AUTO: 190 K/UL — SIGNIFICANT CHANGE UP (ref 150–400)
RBC # BLD: 4.09 M/UL — SIGNIFICANT CHANGE UP (ref 3.8–5.2)
RBC # FLD: 12.2 % — SIGNIFICANT CHANGE UP (ref 10.3–14.5)
WBC # BLD: 5.94 K/UL — SIGNIFICANT CHANGE UP (ref 3.8–10.5)
WBC # FLD AUTO: 5.94 K/UL — SIGNIFICANT CHANGE UP (ref 3.8–10.5)

## 2020-07-16 PROCEDURE — 99214 OFFICE O/P EST MOD 30 MIN: CPT

## 2020-07-22 ENCOUNTER — APPOINTMENT (OUTPATIENT)
Dept: DERMATOLOGY | Facility: CLINIC | Age: 75
End: 2020-07-22
Payer: MEDICARE

## 2020-07-22 DIAGNOSIS — L30.9 DERMATITIS, UNSPECIFIED: ICD-10-CM

## 2020-07-22 PROCEDURE — 99213 OFFICE O/P EST LOW 20 MIN: CPT

## 2020-08-03 ENCOUNTER — RESULT REVIEW (OUTPATIENT)
Age: 75
End: 2020-08-03

## 2020-08-03 ENCOUNTER — APPOINTMENT (OUTPATIENT)
Dept: NEUROLOGY | Facility: CLINIC | Age: 75
End: 2020-08-03

## 2020-08-03 ENCOUNTER — OUTPATIENT (OUTPATIENT)
Dept: OUTPATIENT SERVICES | Facility: HOSPITAL | Age: 75
LOS: 1 days | End: 2020-08-03
Payer: MEDICARE

## 2020-08-03 ENCOUNTER — APPOINTMENT (OUTPATIENT)
Dept: MRI IMAGING | Facility: IMAGING CENTER | Age: 75
End: 2020-08-03
Payer: MEDICARE

## 2020-08-03 DIAGNOSIS — Z90.5 ACQUIRED ABSENCE OF KIDNEY: Chronic | ICD-10-CM

## 2020-08-03 DIAGNOSIS — Z98.891 HISTORY OF UTERINE SCAR FROM PREVIOUS SURGERY: Chronic | ICD-10-CM

## 2020-08-03 DIAGNOSIS — Z90.711 ACQUIRED ABSENCE OF UTERUS WITH REMAINING CERVICAL STUMP: Chronic | ICD-10-CM

## 2020-08-03 DIAGNOSIS — Z92.21 PERSONAL HISTORY OF ANTINEOPLASTIC CHEMOTHERAPY: Chronic | ICD-10-CM

## 2020-08-03 DIAGNOSIS — Z00.8 ENCOUNTER FOR OTHER GENERAL EXAMINATION: ICD-10-CM

## 2020-08-03 PROCEDURE — 70553 MRI BRAIN STEM W/O & W/DYE: CPT | Mod: 26

## 2020-08-03 PROCEDURE — 70553 MRI BRAIN STEM W/O & W/DYE: CPT

## 2020-08-03 PROCEDURE — A9585: CPT

## 2020-08-05 ENCOUNTER — APPOINTMENT (OUTPATIENT)
Dept: NEUROLOGY | Facility: CLINIC | Age: 75
End: 2020-08-05
Payer: MEDICARE

## 2020-08-05 PROCEDURE — 99214 OFFICE O/P EST MOD 30 MIN: CPT

## 2020-08-05 NOTE — PHYSICAL EXAM
[General Appearance - Alert] : alert [General Appearance - In No Acute Distress] : in no acute distress [Impaired Insight] : insight and judgment were intact [Oriented To Time, Place, And Person] : oriented to person, place, and time [Affect] : the affect was normal [Place] : oriented to place [Person] : oriented to person [Time] : oriented to time [Concentration Intact] : normal concentrating ability [Visual Intact] : visual attention was ~T not ~L decreased [Naming Objects] : no difficulty naming common objects [Repeating Phrases] : no difficulty repeating a phrase [Fluency] : fluency intact [Writing A Sentence] : no difficulty writing a sentence [Comprehension] : comprehension intact [Reading] : reading intact [Past History] : adequate knowledge of personal past history [Cranial Nerves Optic (II)] : visual acuity intact bilaterally,  visual fields full to confrontation, pupils equal round and reactive to light [Cranial Nerves Oculomotor (III)] : extraocular motion intact [Cranial Nerves Trigeminal (V)] : facial sensation intact symmetrically [Cranial Nerves Facial (VII)] : face symmetrical [Cranial Nerves Vestibulocochlear (VIII)] : hearing was intact bilaterally [Cranial Nerves Glossopharyngeal (IX)] : tongue and palate midline [Cranial Nerves Accessory (XI - Cranial And Spinal)] : head turning and shoulder shrug symmetric [Cranial Nerves Hypoglossal (XII)] : there was no tongue deviation with protrusion [Motor Tone] : muscle tone was normal in all four extremities [Motor Strength] : muscle strength was normal in all four extremities [Paresis Pronator Drift Right-Sided] : no pronator drift on the right [No Muscle Atrophy] : normal bulk in all four extremities [Paresis Pronator Drift Left-Sided] : no pronator drift on the left [Motor Strength Upper Extremities Bilaterally] : strength was normal in both upper extremities [Motor Strength Lower Extremities Bilaterally] : strength was normal in both lower extremities [Sensation Tactile Decrease] : light touch was intact [Abnormal Walk] : normal gait [Balance] : balance was intact [Tremor] : no tremor present [Past-pointing] : there was no past-pointing [2+] : Ankle jerk right 2+ [Plantar Reflex Right Only] : normal on the right [Plantar Reflex Left Only] : normal on the left [FreeTextEntry4] : 3 cities in 10 minutes. Absolutely no memory deficits, nor any language difficulty.

## 2020-08-05 NOTE — HISTORY OF PRESENT ILLNESS
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 \par \par She had odd sensations in her mouth preceding the diagnosis of breast cancer. These sensations were atypical for olfactory seizures in that she had them after eating very hot soup or hot pizza and the sensation was a persistence of the odd burning feeling. She reports trying brushing her teeth, using lots of mouthwash, and other manouvers to resolve the sensations, but nothing seems to help. She has noticed that when she is hyperglycemic, her mouth becomes dry and the sensations re-appear.\par \par There are no complaints of weakness, visual change, alfonso seizure or other seizure-like activity.\par \par The unpleasant tastes have improved substantially since her last visit with me 9 months ago. These are mostly constant but were worsened after a sunburn and when she cleaned out her basement. \par \par She had mild diarrheal symptoms while living in a household where two others contracted COVID (sister works as an RN in a nursing home facility and her mother is 99 with emphysema), of whom her sister was most afflicted and neither the patient nor her mother were particularly ill. No one required hospitalization.

## 2020-08-05 NOTE — DISCUSSION/SUMMARY
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 As it has not progressed and remains non-enhancing and despite her advanced age, I recommend observation for now. \par \par ALTERED SMELL SENSATION -- This has been reported as a bilateral symptom/sign with unilateral thalamic lesions in the literature. It did not improve with Keppra and does not have epileptic characteristics.\par \par DISPO -- To return with repeat brain MRI in one year.

## 2020-08-05 NOTE — DATA REVIEWED
[de-identified] : I personally reviewed neuroimaging dated\par 5/6/2019	9/19/2019	8/3/2020	\par \par In reviewing these images, I find LEFT POSTERIOR THALAMIC HYPERINTENSITY on the T2 weighted images, with signal change likely REPRESENTING LOW GRADE neoplasm, although CVA is possible, it is felt less likely. \par I am concerned about risk for anaplastic transformation.\par On the contrast enhanced images, there is NO ABNORMAL enhancement.\par DWI imaging shows no acute CVA.\par Overall I find the images to be stable. \par Selected imaging was provided to the patient, along with a detailed verbal explanation of the issues at hand as outlined above.\par

## 2020-09-02 ENCOUNTER — NON-APPOINTMENT (OUTPATIENT)
Age: 75
End: 2020-09-02

## 2020-09-02 ENCOUNTER — APPOINTMENT (OUTPATIENT)
Dept: CARDIOLOGY | Facility: CLINIC | Age: 75
End: 2020-09-02
Payer: MEDICARE

## 2020-09-02 VITALS — HEART RATE: 79 BPM | DIASTOLIC BLOOD PRESSURE: 82 MMHG | SYSTOLIC BLOOD PRESSURE: 123 MMHG | OXYGEN SATURATION: 96 %

## 2020-09-02 DIAGNOSIS — Z79.899 OTHER LONG TERM (CURRENT) DRUG THERAPY: ICD-10-CM

## 2020-09-02 DIAGNOSIS — Z01.818 ENCOUNTER FOR OTHER PREPROCEDURAL EXAMINATION: ICD-10-CM

## 2020-09-02 DIAGNOSIS — Z87.898 PERSONAL HISTORY OF OTHER SPECIFIED CONDITIONS: ICD-10-CM

## 2020-09-02 PROCEDURE — 93000 ELECTROCARDIOGRAM COMPLETE: CPT

## 2020-09-02 PROCEDURE — 99214 OFFICE O/P EST MOD 30 MIN: CPT

## 2020-09-03 PROBLEM — Z79.899 ON ANTINEOPLASTIC CHEMOTHERAPY: Status: RESOLVED | Noted: 2019-05-29 | Resolved: 2020-09-03

## 2020-09-03 PROBLEM — Z01.818 PRE-OP EVALUATION: Status: RESOLVED | Noted: 2019-07-02 | Resolved: 2020-09-03

## 2020-09-03 PROBLEM — Z87.898 HISTORY OF FATIGUE: Status: RESOLVED | Noted: 2018-12-28 | Resolved: 2020-09-03

## 2020-09-03 NOTE — ASSESSMENT
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 s/p exposure to ACT, Xeloda \par S/P bilateral mastectomy without reconstruction s/p Radiation to right chest.  CAD risks: hypertension , Hyperlipidemia,  obesity. Doing well/ s/o covid asx\par \par Blood pressure today is well controlled.\par EKG demonstrating sinus rhythm.\par Exercising well \par Echo 2/2020 next 2021\par awaiting plastics reconstruction\par - Labs form pcp Lipids  at next lab draw \par -continue with current medication.\par - Primary prevention of cardiovascular-related conditions discussed at length, including but not limited to diet and lifestyle modification.  \par - F/U 6  mth s\par \par Thank you for allowing me to participate in the care of your patient. If you have any questions, please feel free to contact me at . \par \par CC: DR. Carla Rodgers \par

## 2020-09-03 NOTE — PHYSICAL EXAM
[Well Groomed] : well groomed [Normal Appearance] : normal appearance [General Appearance - Well Developed] : well developed [No Deformities] : no deformities [General Appearance - Well Nourished] : well nourished [General Appearance - In No Acute Distress] : no acute distress [Normal Conjunctiva] : the conjunctiva exhibited no abnormalities [Eyelids - No Xanthelasma] : the eyelids demonstrated no xanthelasmas [Normal Oral Mucosa] : normal oral mucosa [No Oral Pallor] : no oral pallor [Normal Jugular Venous A Waves Present] : normal jugular venous A waves present [No Oral Cyanosis] : no oral cyanosis [Normal Jugular Venous V Waves Present] : normal jugular venous V waves present [No Jugular Venous Lopez A Waves] : no jugular venous lopez A waves [Exaggerated Use Of Accessory Muscles For Inspiration] : no accessory muscle use [Respiration, Rhythm And Depth] : normal respiratory rhythm and effort [Auscultation Breath Sounds / Voice Sounds] : lungs were clear to auscultation bilaterally [Abdomen Tenderness] : non-tender [Abdomen Soft] : soft [] : no hepato-splenomegaly [Abnormal Walk] : normal gait [Gait - Sufficient For Exercise Testing] : the gait was sufficient for exercise testing [Abdomen Mass (___ Cm)] : no abdominal mass palpated [Normal Rate] : normal [Normal] : normal [Normal S1] : normal S1 [Rhythm Regular] : regular [2+] : left 2+ [No Pitting Edema] : no pitting edema present [FreeTextEntry1] : peripheral neuropathy

## 2020-09-03 NOTE — HISTORY OF PRESENT ILLNESS
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 \par radiation treatment for 25 cycles to be completed feb 22. 2020\par Xeloda twice daily for two weeks and one week off treatment in rotation.\par S/P bilateral mastectomy without reconstruction.   \par \par Interval Note February 19, 2020.\par on capecitabine\par Blood pressure today is well controlled. EKG demonstrating sinus rhythm. She offers no complaints of chest pain, shortness of breath or palpitations.\par \par scheduled for a MOHS procedure with Dr.Jessica Johnson  to excise an atypical intradermal melanocytic proliferation on her right forearm. \par \par Repeat echo was performed today.\par \par Interval follow up 9/2/2020\par onc notes reviewed in October 2020\par s/p asx covid +\par s/p treatment, RT\par awaiting plastics reconstruction \par exercising stationary bike 45 mins a day\par ROS: Denies Chest pain, dyspnea, palpitations, dizziness or syncope.\par \par

## 2020-10-17 ENCOUNTER — OUTPATIENT (OUTPATIENT)
Dept: OUTPATIENT SERVICES | Facility: HOSPITAL | Age: 75
LOS: 1 days | Discharge: HOME | End: 2020-10-17

## 2020-10-17 DIAGNOSIS — Z92.21 PERSONAL HISTORY OF ANTINEOPLASTIC CHEMOTHERAPY: Chronic | ICD-10-CM

## 2020-10-17 DIAGNOSIS — Z98.891 HISTORY OF UTERINE SCAR FROM PREVIOUS SURGERY: Chronic | ICD-10-CM

## 2020-10-17 DIAGNOSIS — C50.919 MALIGNANT NEOPLASM OF UNSPECIFIED SITE OF UNSPECIFIED FEMALE BREAST: ICD-10-CM

## 2020-10-17 DIAGNOSIS — Z90.5 ACQUIRED ABSENCE OF KIDNEY: Chronic | ICD-10-CM

## 2020-10-17 DIAGNOSIS — Z90.711 ACQUIRED ABSENCE OF UTERUS WITH REMAINING CERVICAL STUMP: Chronic | ICD-10-CM

## 2020-10-18 ENCOUNTER — APPOINTMENT (OUTPATIENT)
Dept: INTERNAL MEDICINE | Facility: CLINIC | Age: 75
End: 2020-10-18
Payer: MEDICARE

## 2020-10-18 VITALS
RESPIRATION RATE: 16 BRPM | HEIGHT: 65 IN | BODY MASS INDEX: 31.99 KG/M2 | HEART RATE: 72 BPM | SYSTOLIC BLOOD PRESSURE: 130 MMHG | DIASTOLIC BLOOD PRESSURE: 80 MMHG | WEIGHT: 192 LBS

## 2020-10-18 PROCEDURE — 99214 OFFICE O/P EST MOD 30 MIN: CPT | Mod: 25

## 2020-10-18 PROCEDURE — 36415 COLL VENOUS BLD VENIPUNCTURE: CPT

## 2020-10-18 RX ORDER — AMOXICILLIN AND CLAVULANATE POTASSIUM 875; 125 MG/1; MG/1
875-125 TABLET, COATED ORAL
Qty: 10 | Refills: 0 | Status: COMPLETED | COMMUNITY
Start: 2020-10-18 | End: 2020-10-24

## 2020-10-18 NOTE — PHYSICAL EXAM
[Normal Anterior Cervical Nodes] : no anterior cervical lymphadenopathy [Normal Posterior Cervical Nodes] : no posterior cervical lymphadenopathy [Normal] : affect was normal and insight and judgment were intact [Comprehensive Foot Exam Normal] : Right and left foot were examined and both feet are normal. No ulcers in either foot. Toes are normal and with full ROM.  Normal tactile sensation with monofilament testing throughout both feet [de-identified] : nl TM at the rt, brionna at the left.   [de-identified] : corwingenes

## 2020-10-18 NOTE — COUNSELING
[Benefits of weight loss discussed] : Benefits of weight loss discussed [Encouraged to increase physical activity] : Encouraged to increase physical activity [Potential consequences of obesity discussed] : Potential consequences of obesity discussed [None] : None [Decrease Portions] : decrease portions [Good understanding] : Patient has a good understanding of lifestyle changes and steps needed to achieve self management goal

## 2020-10-18 NOTE — REVIEW OF SYSTEMS
[Negative] : Heme/Lymph [Fever] : no fever [Hot Flashes] : no hot flashes [Fatigue] : no fatigue [Chills] : no chills [Night Sweats] : no night sweats [Recent Change In Weight] : ~T no recent weight change [FreeTextEntry9] : aches improved as noted.  [FreeTextEntry7] : altered taste from the chemo

## 2020-10-18 NOTE — HEALTH RISK ASSESSMENT
[Yes] : Yes [Monthly or less (1 pt)] : Monthly or less (1 point) [1 or 2 (0 pts)] : 1 or 2 (0 points) [Never (0 pts)] : Never (0 points) [No] : In the past 12 months have you used drugs other than those required for medical reasons? No [Audit-CScore] : 1 [] : No

## 2020-10-18 NOTE — HISTORY OF PRESENT ILLNESS
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 \par \par Had tested covid ab positive 4/20\par Had been planning on having repeat reconstructive surgery.  \par Has been followed with cardiology. Has been maintained on current rx.\par following with oncology closely.\par following with derm\par \par Has not been checking FS glucose.    \par BP has been controlled. \par appetite has been about the same - fair. Has been following better diet.\par Walking and going on the bike for exercise.\par Aches have been better.\par Repots some ? numbness at the feet.  \par With recent ear ache and nasal congestion  - took amoxicillin that she has at home with relief.  Has restarted hurting.  \par \par mammo/sono - Br Ca as noted.\par pap overdue.\par \par fit 7/20\par Had flu vaccine\par had prevnar

## 2020-10-19 LAB
ALBUMIN SERPL ELPH-MCNC: 4.9 G/DL
ALP BLD-CCNC: 75 U/L
ALT SERPL-CCNC: 16 U/L
ANION GAP SERPL CALC-SCNC: 15 MMOL/L
AST SERPL-CCNC: 15 U/L
BASOPHILS # BLD AUTO: 0.03 K/UL
BASOPHILS NFR BLD AUTO: 0.5 %
BILIRUB SERPL-MCNC: 0.3 MG/DL
BUN SERPL-MCNC: 30 MG/DL
CALCIUM SERPL-MCNC: 9.9 MG/DL
CHLORIDE SERPL-SCNC: 101 MMOL/L
CHOLEST SERPL-MCNC: 174 MG/DL
CHOLEST/HDLC SERPL: 2.6 RATIO
CO2 SERPL-SCNC: 24 MMOL/L
CREAT SERPL-MCNC: 1.3 MG/DL
EOSINOPHIL # BLD AUTO: 0.2 K/UL
EOSINOPHIL NFR BLD AUTO: 3.1 %
ESTIMATED AVERAGE GLUCOSE: 146 MG/DL
GLUCOSE SERPL-MCNC: 132 MG/DL
HBA1C MFR BLD HPLC: 6.7 %
HCT VFR BLD CALC: 39.3 %
HDLC SERPL-MCNC: 68 MG/DL
HGB BLD-MCNC: 12.5 G/DL
IMM GRANULOCYTES NFR BLD AUTO: 0.3 %
LDLC SERPL CALC-MCNC: 81 MG/DL
LYMPHOCYTES # BLD AUTO: 1.11 K/UL
LYMPHOCYTES NFR BLD AUTO: 17.2 %
MAN DIFF?: NORMAL
MCHC RBC-ENTMCNC: 29.6 PG
MCHC RBC-ENTMCNC: 31.8 GM/DL
MCV RBC AUTO: 92.9 FL
MONOCYTES # BLD AUTO: 0.42 K/UL
MONOCYTES NFR BLD AUTO: 6.5 %
NEUTROPHILS # BLD AUTO: 4.67 K/UL
NEUTROPHILS NFR BLD AUTO: 72.4 %
PLATELET # BLD AUTO: 208 K/UL
POTASSIUM SERPL-SCNC: 4.4 MMOL/L
PROT SERPL-MCNC: 6.7 G/DL
RBC # BLD: 4.23 M/UL
RBC # FLD: 13.7 %
SARS-COV-2 IGG SERPL IA-ACNC: 91.1 INDEX
SARS-COV-2 IGG SERPL QL IA: POSITIVE
SODIUM SERPL-SCNC: 141 MMOL/L
TRIGL SERPL-MCNC: 124 MG/DL
WBC # FLD AUTO: 6.45 K/UL

## 2020-10-22 ENCOUNTER — RESULT REVIEW (OUTPATIENT)
Age: 75
End: 2020-10-22

## 2020-10-22 ENCOUNTER — APPOINTMENT (OUTPATIENT)
Dept: INFUSION THERAPY | Facility: HOSPITAL | Age: 75
End: 2020-10-22

## 2020-10-22 ENCOUNTER — APPOINTMENT (OUTPATIENT)
Dept: HEMATOLOGY ONCOLOGY | Facility: CLINIC | Age: 75
End: 2020-10-22
Payer: MEDICARE

## 2020-10-22 ENCOUNTER — LABORATORY RESULT (OUTPATIENT)
Age: 75
End: 2020-10-22

## 2020-10-22 VITALS
DIASTOLIC BLOOD PRESSURE: 70 MMHG | WEIGHT: 191.8 LBS | SYSTOLIC BLOOD PRESSURE: 130 MMHG | TEMPERATURE: 98 F | HEART RATE: 74 BPM | BODY MASS INDEX: 31.92 KG/M2 | RESPIRATION RATE: 14 BRPM | OXYGEN SATURATION: 98 %

## 2020-10-22 LAB
BASOPHILS # BLD AUTO: 0.02 K/UL — SIGNIFICANT CHANGE UP (ref 0–0.2)
BASOPHILS NFR BLD AUTO: 0.3 % — SIGNIFICANT CHANGE UP (ref 0–2)
EOSINOPHIL # BLD AUTO: 0.19 K/UL — SIGNIFICANT CHANGE UP (ref 0–0.5)
EOSINOPHIL NFR BLD AUTO: 3.1 % — SIGNIFICANT CHANGE UP (ref 0–6)
HCT VFR BLD CALC: 33.2 % — LOW (ref 34.5–45)
HGB BLD-MCNC: 11.2 G/DL — LOW (ref 11.5–15.5)
IMM GRANULOCYTES NFR BLD AUTO: 0.3 % — SIGNIFICANT CHANGE UP (ref 0–1.5)
LYMPHOCYTES # BLD AUTO: 0.88 K/UL — LOW (ref 1–3.3)
LYMPHOCYTES # BLD AUTO: 14.1 % — SIGNIFICANT CHANGE UP (ref 13–44)
MCHC RBC-ENTMCNC: 30.7 PG — SIGNIFICANT CHANGE UP (ref 27–34)
MCHC RBC-ENTMCNC: 33.7 G/DL — SIGNIFICANT CHANGE UP (ref 32–36)
MCV RBC AUTO: 91 FL — SIGNIFICANT CHANGE UP (ref 80–100)
MONOCYTES # BLD AUTO: 0.38 K/UL — SIGNIFICANT CHANGE UP (ref 0–0.9)
MONOCYTES NFR BLD AUTO: 6.1 % — SIGNIFICANT CHANGE UP (ref 2–14)
NEUTROPHILS # BLD AUTO: 4.73 K/UL — SIGNIFICANT CHANGE UP (ref 1.8–7.4)
NEUTROPHILS NFR BLD AUTO: 76.1 % — SIGNIFICANT CHANGE UP (ref 43–77)
NRBC # BLD: 0 /100 WBCS — SIGNIFICANT CHANGE UP (ref 0–0)
PLATELET # BLD AUTO: 194 K/UL — SIGNIFICANT CHANGE UP (ref 150–400)
RBC # BLD: 3.65 M/UL — LOW (ref 3.8–5.2)
RBC # FLD: 13.2 % — SIGNIFICANT CHANGE UP (ref 10.3–14.5)
WBC # BLD: 6.22 K/UL — SIGNIFICANT CHANGE UP (ref 3.8–10.5)
WBC # FLD AUTO: 6.22 K/UL — SIGNIFICANT CHANGE UP (ref 3.8–10.5)

## 2020-10-22 PROCEDURE — 99215 OFFICE O/P EST HI 40 MIN: CPT

## 2020-10-28 ENCOUNTER — APPOINTMENT (OUTPATIENT)
Dept: DERMATOLOGY | Facility: CLINIC | Age: 75
End: 2020-10-28
Payer: MEDICARE

## 2020-10-28 PROCEDURE — 99214 OFFICE O/P EST MOD 30 MIN: CPT

## 2020-10-28 PROCEDURE — 99072 ADDL SUPL MATRL&STAF TM PHE: CPT

## 2020-11-10 ENCOUNTER — RX CHANGE (OUTPATIENT)
Age: 75
End: 2020-11-10

## 2020-11-10 RX ORDER — FLUTICASONE PROPIONATE 50 UG/1
50 SPRAY, METERED NASAL DAILY
Qty: 16 | Refills: 1 | Status: DISCONTINUED | COMMUNITY
Start: 2020-10-18 | End: 2020-11-10

## 2020-11-18 ENCOUNTER — NON-APPOINTMENT (OUTPATIENT)
Age: 75
End: 2020-11-18

## 2020-12-03 ENCOUNTER — APPOINTMENT (OUTPATIENT)
Dept: INTERNAL MEDICINE | Facility: CLINIC | Age: 75
End: 2020-12-03

## 2021-01-15 ENCOUNTER — OUTPATIENT (OUTPATIENT)
Dept: OUTPATIENT SERVICES | Facility: HOSPITAL | Age: 76
LOS: 1 days | Discharge: HOME | End: 2021-01-15

## 2021-01-15 DIAGNOSIS — C50.919 MALIGNANT NEOPLASM OF UNSPECIFIED SITE OF UNSPECIFIED FEMALE BREAST: ICD-10-CM

## 2021-01-15 DIAGNOSIS — Z92.21 PERSONAL HISTORY OF ANTINEOPLASTIC CHEMOTHERAPY: Chronic | ICD-10-CM

## 2021-01-15 DIAGNOSIS — Z90.711 ACQUIRED ABSENCE OF UTERUS WITH REMAINING CERVICAL STUMP: Chronic | ICD-10-CM

## 2021-01-15 DIAGNOSIS — Z90.5 ACQUIRED ABSENCE OF KIDNEY: Chronic | ICD-10-CM

## 2021-01-15 DIAGNOSIS — Z98.891 HISTORY OF UTERINE SCAR FROM PREVIOUS SURGERY: Chronic | ICD-10-CM

## 2021-01-21 ENCOUNTER — RESULT REVIEW (OUTPATIENT)
Age: 76
End: 2021-01-21

## 2021-01-21 ENCOUNTER — APPOINTMENT (OUTPATIENT)
Dept: HEMATOLOGY ONCOLOGY | Facility: CLINIC | Age: 76
End: 2021-01-21
Payer: MEDICARE

## 2021-01-21 ENCOUNTER — APPOINTMENT (OUTPATIENT)
Dept: INFUSION THERAPY | Facility: HOSPITAL | Age: 76
End: 2021-01-21

## 2021-01-21 ENCOUNTER — LABORATORY RESULT (OUTPATIENT)
Age: 76
End: 2021-01-21

## 2021-01-21 VITALS
TEMPERATURE: 98 F | BODY MASS INDEX: 31.55 KG/M2 | DIASTOLIC BLOOD PRESSURE: 90 MMHG | SYSTOLIC BLOOD PRESSURE: 147 MMHG | WEIGHT: 189.6 LBS | OXYGEN SATURATION: 98 % | RESPIRATION RATE: 14 BRPM | HEART RATE: 77 BPM

## 2021-01-21 LAB
BASOPHILS # BLD AUTO: 0.02 K/UL — SIGNIFICANT CHANGE UP (ref 0–0.2)
BASOPHILS NFR BLD AUTO: 0.3 % — SIGNIFICANT CHANGE UP (ref 0–2)
EOSINOPHIL # BLD AUTO: 0.17 K/UL — SIGNIFICANT CHANGE UP (ref 0–0.5)
EOSINOPHIL NFR BLD AUTO: 2.8 % — SIGNIFICANT CHANGE UP (ref 0–6)
HCT VFR BLD CALC: 37.9 % — SIGNIFICANT CHANGE UP (ref 34.5–45)
HGB BLD-MCNC: 12.6 G/DL — SIGNIFICANT CHANGE UP (ref 11.5–15.5)
IMM GRANULOCYTES NFR BLD AUTO: 0.3 % — SIGNIFICANT CHANGE UP (ref 0–1.5)
LYMPHOCYTES # BLD AUTO: 0.95 K/UL — LOW (ref 1–3.3)
LYMPHOCYTES # BLD AUTO: 15.8 % — SIGNIFICANT CHANGE UP (ref 13–44)
MCHC RBC-ENTMCNC: 29.5 PG — SIGNIFICANT CHANGE UP (ref 27–34)
MCHC RBC-ENTMCNC: 33.2 G/DL — SIGNIFICANT CHANGE UP (ref 32–36)
MCV RBC AUTO: 88.8 FL — SIGNIFICANT CHANGE UP (ref 80–100)
MONOCYTES # BLD AUTO: 0.37 K/UL — SIGNIFICANT CHANGE UP (ref 0–0.9)
MONOCYTES NFR BLD AUTO: 6.1 % — SIGNIFICANT CHANGE UP (ref 2–14)
NEUTROPHILS # BLD AUTO: 4.5 K/UL — SIGNIFICANT CHANGE UP (ref 1.8–7.4)
NEUTROPHILS NFR BLD AUTO: 74.7 % — SIGNIFICANT CHANGE UP (ref 43–77)
NRBC # BLD: 0 /100 WBCS — SIGNIFICANT CHANGE UP (ref 0–0)
PLATELET # BLD AUTO: 232 K/UL — SIGNIFICANT CHANGE UP (ref 150–400)
RBC # BLD: 4.27 M/UL — SIGNIFICANT CHANGE UP (ref 3.8–5.2)
RBC # FLD: 13.4 % — SIGNIFICANT CHANGE UP (ref 10.3–14.5)
WBC # BLD: 6.03 K/UL — SIGNIFICANT CHANGE UP (ref 3.8–10.5)
WBC # FLD AUTO: 6.03 K/UL — SIGNIFICANT CHANGE UP (ref 3.8–10.5)

## 2021-01-21 PROCEDURE — 99072 ADDL SUPL MATRL&STAF TM PHE: CPT

## 2021-01-21 PROCEDURE — 99214 OFFICE O/P EST MOD 30 MIN: CPT

## 2021-02-09 ENCOUNTER — RX RENEWAL (OUTPATIENT)
Age: 76
End: 2021-02-09

## 2021-02-21 ENCOUNTER — APPOINTMENT (OUTPATIENT)
Dept: INTERNAL MEDICINE | Facility: CLINIC | Age: 76
End: 2021-02-21
Payer: MEDICARE

## 2021-02-21 VITALS
HEIGHT: 65 IN | SYSTOLIC BLOOD PRESSURE: 140 MMHG | RESPIRATION RATE: 15 BRPM | BODY MASS INDEX: 31.32 KG/M2 | OXYGEN SATURATION: 97 % | HEART RATE: 78 BPM | WEIGHT: 188 LBS | DIASTOLIC BLOOD PRESSURE: 80 MMHG

## 2021-02-21 DIAGNOSIS — H92.03 OTALGIA, BILATERAL: ICD-10-CM

## 2021-02-21 DIAGNOSIS — Z85.828 PERSONAL HISTORY OF OTHER MALIGNANT NEOPLASM OF SKIN: ICD-10-CM

## 2021-02-21 PROCEDURE — 99214 OFFICE O/P EST MOD 30 MIN: CPT | Mod: 25

## 2021-02-21 PROCEDURE — 36415 COLL VENOUS BLD VENIPUNCTURE: CPT

## 2021-02-21 PROCEDURE — 99072 ADDL SUPL MATRL&STAF TM PHE: CPT

## 2021-02-21 NOTE — HISTORY OF PRESENT ILLNESS
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 \par \par Had tested covid ab positive 4/20 - reports that she has had recurrent sx every other mo.  Mild, better and resolved with otc rx x1.  \par Had been planning on having repeat reconstructive surgery.  \par Has been followed with cardiology. Has been maintained on current rx.  due for f/u.  Has appt.\par following with oncology closely - has followed up since last visit..\par following with derm - has followed up since last visit.  \par \par Has not been checking FS glucose.    \par BP has been controlled. \par appetite has been about the same - fair. Has been following better diet.\par Walking and going on the bike for exercise.  Has been daily.\par Aches have been better.\par Repots some ? numbness at the feet.  \par Ear and nose have been better.  Taste has been improving.    \par \par mammo/sono - Br Ca as noted.\par pap overdue.\par \par fit 7/20\par Had flu vaccine\par had prevnar

## 2021-02-21 NOTE — PHYSICAL EXAM
[Normal Posterior Cervical Nodes] : no posterior cervical lymphadenopathy [Normal Anterior Cervical Nodes] : no anterior cervical lymphadenopathy [Normal] : affect was normal and insight and judgment were intact

## 2021-02-21 NOTE — REVIEW OF SYSTEMS
[Negative] : Heme/Lymph [Fever] : no fever [Chills] : no chills [Hot Flashes] : no hot flashes [Fatigue] : no fatigue [Night Sweats] : no night sweats [Recent Change In Weight] : ~T no recent weight change [FreeTextEntry7] : altered taste from the chemo [FreeTextEntry9] : aches improved as noted.

## 2021-02-22 LAB
CHOLEST SERPL-MCNC: 178 MG/DL
ESTIMATED AVERAGE GLUCOSE: 148 MG/DL
HBA1C MFR BLD HPLC: 6.8 %
HDLC SERPL-MCNC: 73 MG/DL
LDLC SERPL CALC-MCNC: 81 MG/DL
NONHDLC SERPL-MCNC: 105 MG/DL
TRIGL SERPL-MCNC: 121 MG/DL

## 2021-03-24 ENCOUNTER — APPOINTMENT (OUTPATIENT)
Dept: CARDIOLOGY | Facility: CLINIC | Age: 76
End: 2021-03-24
Payer: MEDICARE

## 2021-03-24 ENCOUNTER — OUTPATIENT (OUTPATIENT)
Dept: OUTPATIENT SERVICES | Facility: HOSPITAL | Age: 76
LOS: 1 days | End: 2021-03-24
Payer: MEDICARE

## 2021-03-24 ENCOUNTER — APPOINTMENT (OUTPATIENT)
Dept: CV DIAGNOSITCS | Facility: HOSPITAL | Age: 76
End: 2021-03-24

## 2021-03-24 VITALS — SYSTOLIC BLOOD PRESSURE: 164 MMHG | HEART RATE: 66 BPM | OXYGEN SATURATION: 96 % | DIASTOLIC BLOOD PRESSURE: 85 MMHG

## 2021-03-24 DIAGNOSIS — Z90.711 ACQUIRED ABSENCE OF UTERUS WITH REMAINING CERVICAL STUMP: Chronic | ICD-10-CM

## 2021-03-24 DIAGNOSIS — Z90.5 ACQUIRED ABSENCE OF KIDNEY: Chronic | ICD-10-CM

## 2021-03-24 DIAGNOSIS — Z98.891 HISTORY OF UTERINE SCAR FROM PREVIOUS SURGERY: Chronic | ICD-10-CM

## 2021-03-24 DIAGNOSIS — Z51.11 ENCOUNTER FOR ANTINEOPLASTIC CHEMOTHERAPY: ICD-10-CM

## 2021-03-24 DIAGNOSIS — Z92.21 PERSONAL HISTORY OF ANTINEOPLASTIC CHEMOTHERAPY: Chronic | ICD-10-CM

## 2021-03-24 PROCEDURE — 93306 TTE W/DOPPLER COMPLETE: CPT | Mod: 26

## 2021-03-24 PROCEDURE — 99072 ADDL SUPL MATRL&STAF TM PHE: CPT

## 2021-03-24 PROCEDURE — 93306 TTE W/DOPPLER COMPLETE: CPT

## 2021-03-24 PROCEDURE — 93000 ELECTROCARDIOGRAM COMPLETE: CPT

## 2021-03-24 PROCEDURE — 99214 OFFICE O/P EST MOD 30 MIN: CPT

## 2021-03-24 NOTE — PHYSICAL EXAM
[General Appearance - Well Developed] : well developed [Normal Appearance] : normal appearance [Well Groomed] : well groomed [General Appearance - Well Nourished] : well nourished [No Deformities] : no deformities [General Appearance - In No Acute Distress] : no acute distress [Normal Conjunctiva] : the conjunctiva exhibited no abnormalities [Eyelids - No Xanthelasma] : the eyelids demonstrated no xanthelasmas [Normal Oral Mucosa] : normal oral mucosa [No Oral Pallor] : no oral pallor [No Oral Cyanosis] : no oral cyanosis [Normal Jugular Venous A Waves Present] : normal jugular venous A waves present [Normal Jugular Venous V Waves Present] : normal jugular venous V waves present [No Jugular Venous Lopez A Waves] : no jugular venous lopez A waves [Respiration, Rhythm And Depth] : normal respiratory rhythm and effort [Exaggerated Use Of Accessory Muscles For Inspiration] : no accessory muscle use [Auscultation Breath Sounds / Voice Sounds] : lungs were clear to auscultation bilaterally [Abdomen Soft] : soft [Abdomen Tenderness] : non-tender [Abdomen Mass (___ Cm)] : no abdominal mass palpated [Abnormal Walk] : normal gait [Gait - Sufficient For Exercise Testing] : the gait was sufficient for exercise testing [Normal] : normal [Normal Rate] : normal [Rhythm Regular] : regular [Normal S1] : normal S1 [2+] : left 2+ [No Pitting Edema] : no pitting edema present [Skin Color & Pigmentation] : normal skin color and pigmentation [] : no rash [No Venous Stasis] : no venous stasis [Skin Lesions] : no skin lesions [No Skin Ulcers] : no skin ulcer [No Xanthoma] : no  xanthoma was observed [Oriented To Time, Place, And Person] : oriented to person, place, and time [Affect] : the affect was normal [Mood] : the mood was normal [No Anxiety] : not feeling anxious [FreeTextEntry1] : peripheral neuropathy

## 2021-03-24 NOTE — HISTORY OF PRESENT ILLNESS
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 \par radiation treatment for 25 cycles to be completed feb 22. 2020\par Xeloda twice daily for two weeks and one week off treatment in rotation.\par S/P bilateral mastectomy without reconstruction.   \par \par Interval Note February 19, 2020.\par on capecitabine\par Blood pressure today is well controlled. EKG demonstrating sinus rhythm. She offers no complaints of chest pain, shortness of breath or palpitations.\par \par scheduled for a MOHS procedure with Dr.Jessica Johnson  to excise an atypical intradermal melanocytic proliferation on her right forearm. \par \par Repeat echo was performed today.\par \par Interval follow up 9/2/2020\par onc notes reviewed in October 2020\par s/p asx covid +\par s/p treatment, RT\par awaiting plastics reconstruction \par exercising stationary bike 45 mins a day\par ROS: Denies Chest pain, dyspnea, palpitations, dizziness or syncope.\par \par Interval Note\par March 24, 2021\par \par Patient returns for a routine cardiac follow up. 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 BMI  31\par \par S/P ACT; Completed all neoadjuvant chemotherapy in July 2019. \par Radiation treatment for 25 cycles to be completed Feb 22. 2020\par Xeloda twice daily for two weeks and one week off treatment in rotation.\par S/P bilateral mastectomy without reconstruction.   \par \par Patient discontinued treatment last March 2020 per oncology during the start of the COVID pandemic.\par Of note, patient had COVID virus in April of 2020 with mild symptoms.\par \par She has received the first vaccination of Moderna last week.\par Blood pressure today is elevated. She reports that she just took her morning medication. \par \par Echocardiogram was performed this morning: \par Normal LV function\par Reduced global strain when compared to previous study\par

## 2021-03-24 NOTE — ASSESSMENT
[FreeTextEntry1] : 75 year old female with history of right sided breast cancer-> s/p ACT and radiation.\par Hypertension, hyperlipidemia and obesity. Normal LV function with reduced global longitudinal strain\par No longer on oncological therapy.\par \par HTN\par BP today is elevated: 164/85\par Despite the delay in oral antihypertensive medication today, \par Started Amlodipine 2.5 mg daily in light of reduced global strain imaging\par \par Echo : March 24, 2021\par Normal LV function\par global strain -16.7%\par \par Reviewed recent lab work drawn on February 21, 2021.\par Hgb A1C is 6.8\par Lipid \par TC    178\par HDL   73\par LDL    81\par \par CMP within normal limits. Normalized LFTs. \par Encourage walking and decreasing animal fats and sugars from her diet.\par \par Continue with Rosuvastatin 5 mg QHS\par Patient reported that she reviewed Hgb A1C with PCP.\par He is satisfied wit A1C below 7%\par \par Follow up in 3 months.\par \par \par

## 2021-04-26 ENCOUNTER — OUTPATIENT (OUTPATIENT)
Dept: OUTPATIENT SERVICES | Facility: HOSPITAL | Age: 76
LOS: 1 days | Discharge: HOME | End: 2021-04-26

## 2021-04-26 DIAGNOSIS — Z90.5 ACQUIRED ABSENCE OF KIDNEY: Chronic | ICD-10-CM

## 2021-04-26 DIAGNOSIS — C50.919 MALIGNANT NEOPLASM OF UNSPECIFIED SITE OF UNSPECIFIED FEMALE BREAST: ICD-10-CM

## 2021-04-26 DIAGNOSIS — Z90.711 ACQUIRED ABSENCE OF UTERUS WITH REMAINING CERVICAL STUMP: Chronic | ICD-10-CM

## 2021-04-26 DIAGNOSIS — Z92.21 PERSONAL HISTORY OF ANTINEOPLASTIC CHEMOTHERAPY: Chronic | ICD-10-CM

## 2021-04-26 DIAGNOSIS — Z98.891 HISTORY OF UTERINE SCAR FROM PREVIOUS SURGERY: Chronic | ICD-10-CM

## 2021-04-29 ENCOUNTER — APPOINTMENT (OUTPATIENT)
Dept: HEMATOLOGY ONCOLOGY | Facility: CLINIC | Age: 76
End: 2021-04-29
Payer: MEDICARE

## 2021-04-29 ENCOUNTER — RESULT REVIEW (OUTPATIENT)
Age: 76
End: 2021-04-29

## 2021-04-29 ENCOUNTER — APPOINTMENT (OUTPATIENT)
Dept: INFUSION THERAPY | Facility: HOSPITAL | Age: 76
End: 2021-04-29

## 2021-04-29 ENCOUNTER — LABORATORY RESULT (OUTPATIENT)
Age: 76
End: 2021-04-29

## 2021-04-29 VITALS
DIASTOLIC BLOOD PRESSURE: 77 MMHG | RESPIRATION RATE: 16 BRPM | OXYGEN SATURATION: 98 % | BODY MASS INDEX: 32.07 KG/M2 | TEMPERATURE: 97.2 F | HEIGHT: 65 IN | HEART RATE: 82 BPM | SYSTOLIC BLOOD PRESSURE: 113 MMHG | WEIGHT: 192.46 LBS

## 2021-04-29 LAB
BASOPHILS # BLD AUTO: 0.02 K/UL — SIGNIFICANT CHANGE UP (ref 0–0.2)
BASOPHILS NFR BLD AUTO: 0.3 % — SIGNIFICANT CHANGE UP (ref 0–2)
EOSINOPHIL # BLD AUTO: 0.22 K/UL — SIGNIFICANT CHANGE UP (ref 0–0.5)
EOSINOPHIL NFR BLD AUTO: 3.1 % — SIGNIFICANT CHANGE UP (ref 0–6)
HCT VFR BLD CALC: 38 % — SIGNIFICANT CHANGE UP (ref 34.5–45)
HGB BLD-MCNC: 12.9 G/DL — SIGNIFICANT CHANGE UP (ref 11.5–15.5)
IMM GRANULOCYTES NFR BLD AUTO: 0.4 % — SIGNIFICANT CHANGE UP (ref 0–1.5)
LYMPHOCYTES # BLD AUTO: 1.23 K/UL — SIGNIFICANT CHANGE UP (ref 1–3.3)
LYMPHOCYTES # BLD AUTO: 17.5 % — SIGNIFICANT CHANGE UP (ref 13–44)
MCHC RBC-ENTMCNC: 29.9 PG — SIGNIFICANT CHANGE UP (ref 27–34)
MCHC RBC-ENTMCNC: 33.9 G/DL — SIGNIFICANT CHANGE UP (ref 32–36)
MCV RBC AUTO: 88.2 FL — SIGNIFICANT CHANGE UP (ref 80–100)
MONOCYTES # BLD AUTO: 0.52 K/UL — SIGNIFICANT CHANGE UP (ref 0–0.9)
MONOCYTES NFR BLD AUTO: 7.4 % — SIGNIFICANT CHANGE UP (ref 2–14)
NEUTROPHILS # BLD AUTO: 5.02 K/UL — SIGNIFICANT CHANGE UP (ref 1.8–7.4)
NEUTROPHILS NFR BLD AUTO: 71.3 % — SIGNIFICANT CHANGE UP (ref 43–77)
NRBC # BLD: 0 /100 WBCS — SIGNIFICANT CHANGE UP (ref 0–0)
PLATELET # BLD AUTO: 226 K/UL — SIGNIFICANT CHANGE UP (ref 150–400)
RBC # BLD: 4.31 M/UL — SIGNIFICANT CHANGE UP (ref 3.8–5.2)
RBC # FLD: 13.1 % — SIGNIFICANT CHANGE UP (ref 10.3–14.5)
WBC # BLD: 7.04 K/UL — SIGNIFICANT CHANGE UP (ref 3.8–10.5)
WBC # FLD AUTO: 7.04 K/UL — SIGNIFICANT CHANGE UP (ref 3.8–10.5)

## 2021-04-29 PROCEDURE — 99072 ADDL SUPL MATRL&STAF TM PHE: CPT

## 2021-04-29 PROCEDURE — 99214 OFFICE O/P EST MOD 30 MIN: CPT

## 2021-05-02 ENCOUNTER — RX RENEWAL (OUTPATIENT)
Age: 76
End: 2021-05-02

## 2021-05-05 ENCOUNTER — APPOINTMENT (OUTPATIENT)
Dept: DERMATOLOGY | Facility: CLINIC | Age: 76
End: 2021-05-05
Payer: MEDICARE

## 2021-05-05 PROCEDURE — 99072 ADDL SUPL MATRL&STAF TM PHE: CPT

## 2021-05-05 PROCEDURE — 99213 OFFICE O/P EST LOW 20 MIN: CPT

## 2021-06-13 ENCOUNTER — APPOINTMENT (OUTPATIENT)
Dept: INTERNAL MEDICINE | Facility: CLINIC | Age: 76
End: 2021-06-13
Payer: MEDICARE

## 2021-06-13 VITALS
SYSTOLIC BLOOD PRESSURE: 110 MMHG | OXYGEN SATURATION: 97 % | HEART RATE: 84 BPM | TEMPERATURE: 98.1 F | WEIGHT: 196 LBS | BODY MASS INDEX: 32.65 KG/M2 | RESPIRATION RATE: 16 BRPM | HEIGHT: 65 IN | DIASTOLIC BLOOD PRESSURE: 70 MMHG

## 2021-06-13 DIAGNOSIS — M79.18 MYALGIA, OTHER SITE: ICD-10-CM

## 2021-06-13 DIAGNOSIS — K14.6 GLOSSODYNIA: ICD-10-CM

## 2021-06-13 DIAGNOSIS — R59.0 LOCALIZED ENLARGED LYMPH NODES: ICD-10-CM

## 2021-06-13 DIAGNOSIS — R21 RASH AND OTHER NONSPECIFIC SKIN ERUPTION: ICD-10-CM

## 2021-06-13 DIAGNOSIS — Z51.11 ENCOUNTER FOR ANTINEOPLASTIC CHEMOTHERAPY: ICD-10-CM

## 2021-06-13 DIAGNOSIS — Z88.9 ALLERGY STATUS TO UNSPECIFIED DRUGS, MEDICAMENTS AND BIOLOGICAL SUBSTANCES: ICD-10-CM

## 2021-06-13 PROCEDURE — 99072 ADDL SUPL MATRL&STAF TM PHE: CPT

## 2021-06-13 PROCEDURE — 99214 OFFICE O/P EST MOD 30 MIN: CPT | Mod: 25

## 2021-06-13 PROCEDURE — 36415 COLL VENOUS BLD VENIPUNCTURE: CPT

## 2021-06-13 NOTE — HEALTH RISK ASSESSMENT
[Yes] : Yes [Monthly or less (1 pt)] : Monthly or less (1 point) [1 or 2 (0 pts)] : 1 or 2 (0 points) [Never (0 pts)] : Never (0 points) [No] : In the past 12 months have you used drugs other than those required for medical reasons? No [No falls in past year] : Patient reported no falls in the past year [0] : 2) Feeling down, depressed, or hopeless: Not at all (0) [] : No [Audit-CScore] : 1 [[id number]] : 0

## 2021-06-13 NOTE — HISTORY OF PRESENT ILLNESS
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 \par \par Had tested covid ab positive 4/20 - reports that she has had recurrent sx every other mo.  Mild, better and resolved with otc rx x1.  \par Had been planning on having repeat reconstructive surgery.  \par Has been followed with cardiology. Has been maintained on current rx.  Had meds adjusted for el bp.\par following with oncology closely - has followed up since last visit..\par following with derm - has followed up since last visit.  \par \par Has not been checking FS glucose.    \par BP has been controlled, started on low dose rx with cardiology as noted. \par appetite has been about the same - fair. Has been following better diet.\par Walking and going on the bike for exercise.  Has been daily.\par Aches have been better.\par Repots some ? numbness at the feet.  \par Ear and nose have been better.  Taste has been improving - ` 90%.    \par \par mammo/sono - Br Ca as noted.\par pap overdue.\par \par fit 7/20 - \par Had flu vaccine\par had prevnar\par Pneumovax - [facility] - ~ 2018\par Had covid vaccine x2\par Reprots zoster vaccine ` 2014

## 2021-06-13 NOTE — REVIEW OF SYSTEMS
[Negative] : Heme/Lymph [Fever] : no fever [Chills] : no chills [Fatigue] : no fatigue [Hot Flashes] : no hot flashes [Recent Change In Weight] : ~T no recent weight change [Night Sweats] : no night sweats [FreeTextEntry7] : altered taste from the chemo - 90% better [FreeTextEntry9] : aches improved as noted.

## 2021-06-14 LAB
ALBUMIN SERPL ELPH-MCNC: 4.7 G/DL
ALP BLD-CCNC: 66 U/L
ALT SERPL-CCNC: 16 U/L
ANION GAP SERPL CALC-SCNC: 14 MMOL/L
AST SERPL-CCNC: 18 U/L
BASOPHILS # BLD AUTO: 0.02 K/UL
BASOPHILS NFR BLD AUTO: 0.3 %
BILIRUB SERPL-MCNC: 0.4 MG/DL
BUN SERPL-MCNC: 25 MG/DL
CALCIUM SERPL-MCNC: 9.9 MG/DL
CHLORIDE SERPL-SCNC: 103 MMOL/L
CHOLEST SERPL-MCNC: 169 MG/DL
CO2 SERPL-SCNC: 22 MMOL/L
COVID-19 SPIKE DOMAIN ANTIBODY INTERPRETATION: POSITIVE
CREAT SERPL-MCNC: 1.17 MG/DL
CREAT SPEC-SCNC: 30 MG/DL
EOSINOPHIL # BLD AUTO: 0.25 K/UL
EOSINOPHIL NFR BLD AUTO: 3.9 %
ESTIMATED AVERAGE GLUCOSE: 151 MG/DL
GLUCOSE SERPL-MCNC: 135 MG/DL
HBA1C MFR BLD HPLC: 6.9 %
HCT VFR BLD CALC: 36.6 %
HDLC SERPL-MCNC: 69 MG/DL
HGB BLD-MCNC: 12 G/DL
IMM GRANULOCYTES NFR BLD AUTO: 0.5 %
LDLC SERPL CALC-MCNC: 77 MG/DL
LYMPHOCYTES # BLD AUTO: 1.26 K/UL
LYMPHOCYTES NFR BLD AUTO: 19.6 %
MAN DIFF?: NORMAL
MCHC RBC-ENTMCNC: 29.9 PG
MCHC RBC-ENTMCNC: 32.8 GM/DL
MCV RBC AUTO: 91 FL
MICROALBUMIN 24H UR DL<=1MG/L-MCNC: <1.2 MG/DL
MICROALBUMIN/CREAT 24H UR-RTO: NORMAL MG/G
MONOCYTES # BLD AUTO: 0.44 K/UL
MONOCYTES NFR BLD AUTO: 6.8 %
NEUTROPHILS # BLD AUTO: 4.44 K/UL
NEUTROPHILS NFR BLD AUTO: 68.9 %
NONHDLC SERPL-MCNC: 99 MG/DL
PLATELET # BLD AUTO: 217 K/UL
POTASSIUM SERPL-SCNC: 4.5 MMOL/L
PROT SERPL-MCNC: 6.8 G/DL
RBC # BLD: 4.02 M/UL
RBC # FLD: 13.4 %
SARS-COV-2 AB SERPL IA-ACNC: >250 U/ML
SODIUM SERPL-SCNC: 140 MMOL/L
TRIGL SERPL-MCNC: 109 MG/DL
WBC # FLD AUTO: 6.44 K/UL

## 2021-07-13 LAB — HEMOCCULT STL QL IA: NEGATIVE

## 2021-07-17 ENCOUNTER — OUTPATIENT (OUTPATIENT)
Dept: OUTPATIENT SERVICES | Facility: HOSPITAL | Age: 76
LOS: 1 days | Discharge: HOME | End: 2021-07-17

## 2021-07-17 DIAGNOSIS — Z98.891 HISTORY OF UTERINE SCAR FROM PREVIOUS SURGERY: Chronic | ICD-10-CM

## 2021-07-17 DIAGNOSIS — Z92.21 PERSONAL HISTORY OF ANTINEOPLASTIC CHEMOTHERAPY: Chronic | ICD-10-CM

## 2021-07-17 DIAGNOSIS — C50.919 MALIGNANT NEOPLASM OF UNSPECIFIED SITE OF UNSPECIFIED FEMALE BREAST: ICD-10-CM

## 2021-07-17 DIAGNOSIS — Z90.5 ACQUIRED ABSENCE OF KIDNEY: Chronic | ICD-10-CM

## 2021-07-17 DIAGNOSIS — Z90.711 ACQUIRED ABSENCE OF UTERUS WITH REMAINING CERVICAL STUMP: Chronic | ICD-10-CM

## 2021-07-20 ENCOUNTER — APPOINTMENT (OUTPATIENT)
Dept: HEMATOLOGY ONCOLOGY | Facility: CLINIC | Age: 76
End: 2021-07-20
Payer: MEDICARE

## 2021-07-20 ENCOUNTER — RESULT REVIEW (OUTPATIENT)
Age: 76
End: 2021-07-20

## 2021-07-20 ENCOUNTER — APPOINTMENT (OUTPATIENT)
Dept: INFUSION THERAPY | Facility: HOSPITAL | Age: 76
End: 2021-07-20

## 2021-07-20 ENCOUNTER — LABORATORY RESULT (OUTPATIENT)
Age: 76
End: 2021-07-20

## 2021-07-20 VITALS
OXYGEN SATURATION: 97 % | HEIGHT: 64.96 IN | HEART RATE: 70 BPM | TEMPERATURE: 97.3 F | RESPIRATION RATE: 16 BRPM | SYSTOLIC BLOOD PRESSURE: 133 MMHG | BODY MASS INDEX: 32.62 KG/M2 | DIASTOLIC BLOOD PRESSURE: 84 MMHG | WEIGHT: 195.77 LBS

## 2021-07-20 LAB
BASOPHILS # BLD AUTO: 0.02 K/UL — SIGNIFICANT CHANGE UP (ref 0–0.2)
BASOPHILS NFR BLD AUTO: 0.3 % — SIGNIFICANT CHANGE UP (ref 0–2)
EOSINOPHIL # BLD AUTO: 0.27 K/UL — SIGNIFICANT CHANGE UP (ref 0–0.5)
EOSINOPHIL NFR BLD AUTO: 4.1 % — SIGNIFICANT CHANGE UP (ref 0–6)
HCT VFR BLD CALC: 36.3 % — SIGNIFICANT CHANGE UP (ref 34.5–45)
HGB BLD-MCNC: 12.2 G/DL — SIGNIFICANT CHANGE UP (ref 11.5–15.5)
IMM GRANULOCYTES NFR BLD AUTO: 0.5 % — SIGNIFICANT CHANGE UP (ref 0–1.5)
LYMPHOCYTES # BLD AUTO: 1.25 K/UL — SIGNIFICANT CHANGE UP (ref 1–3.3)
LYMPHOCYTES # BLD AUTO: 18.8 % — SIGNIFICANT CHANGE UP (ref 13–44)
MCHC RBC-ENTMCNC: 30.3 PG — SIGNIFICANT CHANGE UP (ref 27–34)
MCHC RBC-ENTMCNC: 33.6 G/DL — SIGNIFICANT CHANGE UP (ref 32–36)
MCV RBC AUTO: 90.3 FL — SIGNIFICANT CHANGE UP (ref 80–100)
MONOCYTES # BLD AUTO: 0.52 K/UL — SIGNIFICANT CHANGE UP (ref 0–0.9)
MONOCYTES NFR BLD AUTO: 7.8 % — SIGNIFICANT CHANGE UP (ref 2–14)
NEUTROPHILS # BLD AUTO: 4.57 K/UL — SIGNIFICANT CHANGE UP (ref 1.8–7.4)
NEUTROPHILS NFR BLD AUTO: 68.5 % — SIGNIFICANT CHANGE UP (ref 43–77)
NRBC # BLD: 0 /100 WBCS — SIGNIFICANT CHANGE UP (ref 0–0)
PLATELET # BLD AUTO: 230 K/UL — SIGNIFICANT CHANGE UP (ref 150–400)
RBC # BLD: 4.02 M/UL — SIGNIFICANT CHANGE UP (ref 3.8–5.2)
RBC # FLD: 13.2 % — SIGNIFICANT CHANGE UP (ref 10.3–14.5)
WBC # BLD: 6.66 K/UL — SIGNIFICANT CHANGE UP (ref 3.8–10.5)
WBC # FLD AUTO: 6.66 K/UL — SIGNIFICANT CHANGE UP (ref 3.8–10.5)

## 2021-07-20 PROCEDURE — 99214 OFFICE O/P EST MOD 30 MIN: CPT

## 2021-07-20 PROCEDURE — 99072 ADDL SUPL MATRL&STAF TM PHE: CPT

## 2021-07-20 RX ORDER — TRIAMCINOLONE ACETONIDE 1 MG/G
0.1 OINTMENT TOPICAL
Qty: 1 | Refills: 1 | Status: DISCONTINUED | COMMUNITY
Start: 2020-07-22 | End: 2021-07-20

## 2021-07-20 NOTE — PHYSICAL EXAM
20-Jul-2021 [General Appearance - Alert] : alert [General Appearance - In No Acute Distress] : in no acute distress [Oriented To Time, Place, And Person] : oriented to person, place, and time [Impaired Insight] : insight and judgment were intact [Affect] : the affect was normal [Person] : oriented to person [Place] : oriented to place [Time] : oriented to time [Concentration Intact] : normal concentrating ability [Visual Intact] : visual attention was ~T not ~L decreased [Naming Objects] : no difficulty naming common objects [Repeating Phrases] : no difficulty repeating a phrase [Writing A Sentence] : no difficulty writing a sentence [Fluency] : fluency intact [Comprehension] : comprehension intact [Reading] : reading intact [Past History] : adequate knowledge of personal past history [Cranial Nerves Optic (II)] : visual acuity intact bilaterally,  visual fields full to confrontation, pupils equal round and reactive to light [Cranial Nerves Oculomotor (III)] : extraocular motion intact [Cranial Nerves Trigeminal (V)] : facial sensation intact symmetrically [Cranial Nerves Facial (VII)] : face symmetrical [Cranial Nerves Vestibulocochlear (VIII)] : hearing was intact bilaterally [Cranial Nerves Glossopharyngeal (IX)] : tongue and palate midline [Cranial Nerves Accessory (XI - Cranial And Spinal)] : head turning and shoulder shrug symmetric [Cranial Nerves Hypoglossal (XII)] : there was no tongue deviation with protrusion [Motor Tone] : muscle tone was normal in all four extremities [Motor Strength] : muscle strength was normal in all four extremities [No Muscle Atrophy] : normal bulk in all four extremities [Paresis Pronator Drift Right-Sided] : no pronator drift on the right [Paresis Pronator Drift Left-Sided] : no pronator drift on the left [Motor Strength Upper Extremities Bilaterally] : strength was normal in both upper extremities [Motor Strength Lower Extremities Bilaterally] : strength was normal in both lower extremities [Sensation Tactile Decrease] : light touch was intact [Abnormal Walk] : normal gait [Balance] : balance was intact [Past-pointing] : there was no past-pointing [Tremor] : no tremor present [2+] : Ankle jerk left 2+ [Plantar Reflex Right Only] : normal on the right [Plantar Reflex Left Only] : normal on the left [FreeTextEntry4] : 3 cities in 10 minutes. Absolutely no memory deficits, nor any language difficulty.

## 2021-07-26 ENCOUNTER — NON-APPOINTMENT (OUTPATIENT)
Age: 76
End: 2021-07-26

## 2021-07-28 ENCOUNTER — APPOINTMENT (OUTPATIENT)
Dept: HEMATOLOGY ONCOLOGY | Facility: CLINIC | Age: 76
End: 2021-07-28

## 2021-07-28 ENCOUNTER — LABORATORY RESULT (OUTPATIENT)
Age: 76
End: 2021-07-28

## 2021-07-28 ENCOUNTER — NON-APPOINTMENT (OUTPATIENT)
Age: 76
End: 2021-07-28

## 2021-07-28 ENCOUNTER — APPOINTMENT (OUTPATIENT)
Dept: CARDIOLOGY | Facility: CLINIC | Age: 76
End: 2021-07-28
Payer: MEDICARE

## 2021-07-28 VITALS — DIASTOLIC BLOOD PRESSURE: 73 MMHG | OXYGEN SATURATION: 97 % | HEART RATE: 70 BPM | SYSTOLIC BLOOD PRESSURE: 127 MMHG

## 2021-07-28 PROCEDURE — 93000 ELECTROCARDIOGRAM COMPLETE: CPT

## 2021-07-28 PROCEDURE — 99214 OFFICE O/P EST MOD 30 MIN: CPT

## 2021-07-28 RX ORDER — LIDOCAINE AND PRILOCAINE 25; 25 MG/G; MG/G
2.5-2.5 CREAM TOPICAL
Qty: 1 | Refills: 11 | Status: DISCONTINUED | COMMUNITY
Start: 2019-12-16 | End: 2021-07-28

## 2021-07-29 ENCOUNTER — OUTPATIENT (OUTPATIENT)
Dept: OUTPATIENT SERVICES | Facility: HOSPITAL | Age: 76
LOS: 1 days | End: 2021-07-29
Payer: MEDICARE

## 2021-07-29 ENCOUNTER — RESULT REVIEW (OUTPATIENT)
Age: 76
End: 2021-07-29

## 2021-07-29 VITALS
HEART RATE: 70 BPM | OXYGEN SATURATION: 98 % | DIASTOLIC BLOOD PRESSURE: 84 MMHG | SYSTOLIC BLOOD PRESSURE: 141 MMHG | RESPIRATION RATE: 15 BRPM

## 2021-07-29 VITALS
TEMPERATURE: 98 F | SYSTOLIC BLOOD PRESSURE: 127 MMHG | RESPIRATION RATE: 18 BRPM | HEART RATE: 81 BPM | OXYGEN SATURATION: 95 % | DIASTOLIC BLOOD PRESSURE: 74 MMHG

## 2021-07-29 DIAGNOSIS — C50.919 MALIGNANT NEOPLASM OF UNSPECIFIED SITE OF UNSPECIFIED FEMALE BREAST: ICD-10-CM

## 2021-07-29 DIAGNOSIS — Z90.711 ACQUIRED ABSENCE OF UTERUS WITH REMAINING CERVICAL STUMP: Chronic | ICD-10-CM

## 2021-07-29 DIAGNOSIS — Z90.5 ACQUIRED ABSENCE OF KIDNEY: Chronic | ICD-10-CM

## 2021-07-29 DIAGNOSIS — Z98.891 HISTORY OF UTERINE SCAR FROM PREVIOUS SURGERY: Chronic | ICD-10-CM

## 2021-07-29 DIAGNOSIS — Z92.21 PERSONAL HISTORY OF ANTINEOPLASTIC CHEMOTHERAPY: Chronic | ICD-10-CM

## 2021-07-29 PROCEDURE — 36590 REMOVAL TUNNELED CV CATH: CPT

## 2021-07-29 RX ORDER — CARVEDILOL PHOSPHATE 80 MG/1
1 CAPSULE, EXTENDED RELEASE ORAL
Qty: 0 | Refills: 0 | DISCHARGE

## 2021-07-29 RX ORDER — FENTANYL CITRATE 50 UG/ML
25 INJECTION INTRAVENOUS
Refills: 0 | Status: DISCONTINUED | OUTPATIENT
Start: 2021-07-29 | End: 2021-07-29

## 2021-07-29 RX ORDER — ONDANSETRON 8 MG/1
4 TABLET, FILM COATED ORAL ONCE
Refills: 0 | Status: DISCONTINUED | OUTPATIENT
Start: 2021-07-29 | End: 2021-08-12

## 2021-07-29 RX ORDER — METFORMIN HYDROCHLORIDE 850 MG/1
1 TABLET ORAL
Qty: 0 | Refills: 0 | DISCHARGE

## 2021-07-29 RX ORDER — AMLODIPINE BESYLATE 2.5 MG/1
1 TABLET ORAL
Qty: 0 | Refills: 0 | DISCHARGE

## 2021-07-29 RX ORDER — OMEPRAZOLE 10 MG/1
1 CAPSULE, DELAYED RELEASE ORAL
Qty: 0 | Refills: 0 | DISCHARGE

## 2021-07-29 RX ORDER — ATORVASTATIN CALCIUM 80 MG/1
5 TABLET, FILM COATED ORAL
Qty: 0 | Refills: 0 | DISCHARGE

## 2021-07-29 NOTE — PRE PROCEDURE NOTE - PRE PROCEDURE EVALUATION
Interventional Radiology    HPI: 75y Female with right breast Ca s/p bilateral mastectomy, chemo and RT with left chest port no longer requiring chemo here for port removal.    Allergies: Ceclor (Hives)  Taxol (Anaphylaxis)  Taxotere (Anaphylaxis)    Medications (Abx/Cardiac/Anticoagulation/Blood Products)      Data:    T(C): --  HR: --  BP: --  RR: --  SpO2: --    Exam  General: No acute distress  Chest: Non labored breathing  Abdomen: Non-distended  Extremities: No swelling, warm          Imaging: Reviewed    Plan: 75y Female presents for right breast Ca with left chest port no longer requiring chemo here for port removal.  - Plan for left chest port removal    -Risks/Benefits/alternatives explained with the patient and/or healthcare proxy and witnessed informed consent obtained.

## 2021-07-29 NOTE — ASU DISCHARGE PLAN (ADULT/PEDIATRIC) - NURSING INSTRUCTIONS
Please feel free to contact us at [phone] if any problems arise. After 6PM, Monday through Friday, on weekends and on holidays, please call [phone] and ask for the radiology resident on call to be paged.

## 2021-07-29 NOTE — ASU DISCHARGE PLAN (ADULT/PEDIATRIC) - ASU DC SPECIAL INSTRUCTIONSFT
Chest Port Placement    Discharge Instructions  - You have had a chest port implanted in your chest.   - The port is ready for use.  - You may shower in 48 hours. No soaking or swimming for 2 weeks or until the site is completely healed.  - Keep the area covered and dry for the next 7 days. It may be removed by a chemotherapy nurse as needed for treatment.  - Do not perform any heavy lifting or put tension on the area for the next week or until the site is healed.  - Do not remove steri-strips. They will fall of in 2-3 weeks  - You may resume your normal diet.  - You may resume your normal medications however you should wait 48 hours before restarting aspirin, plavix, or blood thinners.  - It is normal to experience some pain over the site for the next few days. You may take apply ice to the area (20 minutes on, 20 minutes off) and take Tylenol for that pain. Do not take more frequently than every 6 hours and do not exceed more than 3000mg of Tylenol in a 24 hour period.    - You were given conscious sedation which may make you drowsy, therefore you need someone to stay with you until the morning following the procedure.  - Do not drive, engage in heavy lifting or strenuous activity, or drink any alcoholic beverages for the next 24 hours.   - You may resume normal activity in 24 hours.    Notify your primary physician and/or Interventional Radiology IMMEDIATELY if you experience any of the following       - Fever of 100.5       - Chills or Rigors/ Shakes       - Swelling and/or Redness in the area around the port       - Worsening Pain       - Blood soaked bandages or worsening bleeding       - Lightheadedness and/or dizziness upon standing       - Chest Pain/ Tightness       - Shortness of Breath       - Difficulty walking    If you have a problem that you believe requires IMMEDIATE attention, please go to your NEAREST Emergency Room. If you believe your problem can safely wait until you speak to a physician, please call Interventional Radiology for any concerns.    During Normal Weekday Business Hours- You can contact the Interventional Radiology department during normal business hours via telephone.  During Evenings and Weekends- If you need to contact Interventional Radiology during off hours, do so by calling the hospital and requesting to be connected to the Interventional Radiologist on call. Chest Port Removal    Discharge Instructions  - You have had a chest port removed from your chest.   - You may shower in 48 hours. No soaking or swimming for 2 weeks or until the site is completely healed.  - Keep the area covered and dry for the next 7 days.  - Do not perform any heavy lifting or put tension on the area for the next week or until the site is healed.  - Do not remove steri-strips. They will fall off in the next 2-3 weeks.  - You may resume your normal diet.  - You may resume your normal medications however you should wait 48 hours before restarting aspirin, plavix, or blood thinners.  - It is normal to experience some pain over the site for the next few days. You may take apply ice to the area (20 minutes on, 20 minutes off) and take Tylenol for that pain. Do not take more frequently than every 6 hours and do not exceed more than 3000mg of Tylenol in a 24 hour period.    - You were given conscious sedation which may make you drowsy, therefore you need someone to stay with you until the morning following the procedure.  - Do not drive, engage in heavy lifting or strenuous activity, or drink any alcoholic beverages for the next 24 hours.   - You may resume normal activity in 24 hours.    Notify your primary physician and/or Interventional Radiology IMMEDIATELY if you experience any of the following       - Fever of 100.5F       - Chills or Rigors/ Shakes       - Swelling and/or Redness in the area around the port removal site       - Worsening Pain       - Blood soaked bandages or worsening bleeding       - Lightheadedness and/or dizziness upon standing       - Chest Pain/ Tightness       - Shortness of Breath       - Difficulty walking    If you have a problem that you believe requires IMMEDIATE attention, please go to your NEAREST Emergency Room. If you believe your problem can safely wait until you speak to a physician, please call Interventional Radiology for any concerns.    During Normal Weekday Business Hours- You can contact the Interventional Radiology department during normal business hours via telephone.  During Evenings and Weekends- If you need to contact Interventional Radiology during off hours, do so by calling the hospital and requesting to be connected to the Interventional Radiologist on call. Chest Port Removal      Discharge Instructions  - You have had a chest port removed from your chest.   - You may shower in 48 hours. No soaking or swimming for 2 weeks or until the site is completely healed.  - Keep the area covered and dry for the next 7 days.  - Do not perform any heavy lifting or put tension on the area for the next week or until the site is healed.  - Do not remove steri-strips. They will fall off in the next 2-3 weeks.  - You may resume your normal diet.  - You may resume your normal medications however you should wait 48 hours before restarting aspirin, plavix, or blood thinners.  - It is normal to experience some pain over the site for the next few days. You may take apply ice to the area (20 minutes on, 20 minutes off) and take Tylenol for that pain. Do not take more frequently than every 6 hours and do not exceed more than 3000mg of Tylenol in a 24 hour period.    - You were given conscious sedation which may make you drowsy, therefore you need someone to stay with you until the morning following the procedure.  - Do not drive, engage in heavy lifting or strenuous activity, or drink any alcoholic beverages for the next 24 hours.   - You may resume normal activity in 24 hours.    Notify your primary physician and/or Interventional Radiology IMMEDIATELY if you experience any of the following       - Fever of 100.5F       - Chills or Rigors/ Shakes       - Swelling and/or Redness in the area around the port removal site       - Worsening Pain       - Blood soaked bandages or worsening bleeding       - Lightheadedness and/or dizziness upon standing       - Chest Pain/ Tightness       - Shortness of Breath       - Difficulty walking    If you have a problem that you believe requires IMMEDIATE attention, please go to your NEAREST Emergency Room. If you believe your problem can safely wait until you speak to a physician, please call Interventional Radiology for any concerns.    During Normal Weekday Business Hours- You can contact the Interventional Radiology department during normal business hours via telephone.  During Evenings and Weekends- If you need to contact Interventional Radiology during off hours, do so by calling the hospital and requesting to be connected to the Interventional Radiologist on call.

## 2021-07-29 NOTE — ASU PREOP CHECKLIST - SITE MARKED BY ANESTHESIOLOGIST
Quality 130: Documentation Of Current Medications In The Medical Record: Current Medications Documented Quality 131: Pain Assessment And Follow-Up: Pain assessment using a standardized tool is documented as negative, no follow-up plan required Additional Notes: Patient states pain as negative, and on scale of 0-10 the pain level is 0.\\nPt did not receive shingrix due to availability Detail Level: Detailed Quality 474: Zoster Vaccination Status: Shingrix vaccine was not administered for reasons documented by clinician (e.g. patient administered vaccine other than Shingrix, patient allergy or other medical reasons, patient declined or other patient reasons, vaccine not available or other system reasons) Quality 265: Biopsy Follow-Up: Documentation of Patient OR System Reason(s) for not Performing up to Three of the Four Components of the Numerator Instructions: Reviewing, Communicating, Tracking, and/or Documenting Biopsy Results, Patient not Eligible n/a

## 2021-07-30 ENCOUNTER — NON-APPOINTMENT (OUTPATIENT)
Age: 76
End: 2021-07-30

## 2021-08-02 NOTE — HISTORY OF PRESENT ILLNESS
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 \par radiation treatment for 25 cycles to be completed feb 22. 2020\par Xeloda twice daily for two weeks and one week off treatment in rotation.\par S/P bilateral mastectomy without reconstruction.   \par \par Interval Note February 19, 2020.\par on capecitabine\par scheduled for a MOHS procedure with Dr.Jessica Johnson  to excise an atypical intradermal melanocytic proliferation on her right forearm. \par \par Repeat echo was performed today.\par \par Interval follow up 9/2/2020\par onc notes reviewed in October 2020\par s/p asx covid +\par s/p treatment, RT\par awaiting plastics reconstruction \par exercising stationary bike 45 mins a day\par ROS: Denies Chest pain, dyspnea, palpitations, dizziness or syncope.\par \par Interval Note March 24, 2021\par \par Patient returns for a routine cardiac follow up. 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 BMI  31\par \par S/P ACT; Completed all neoadjuvant chemotherapy in July 2019. \par Radiation treatment for 25 cycles to be completed Feb 22. 2020\par Xeloda twice daily for two weeks and one week off treatment in rotation.\par S/P bilateral mastectomy without reconstruction.   \par \par Patient discontinued treatment last March 2020 per oncology during the start of the COVID pandemic.\par Of note, patient had COVID virus in April of 2020 with mild symptoms.\par \par She has received the first vaccination of Moderna last week.\par Blood pressure today is elevated. She reports that she just took her morning medication. \par \par Echocardiogram was performed this morning: \par Normal LV function\par Reduced global strain when compared to previous study\par \par Interval follow-up July 28, 2021\par Pending port removal this week\par EKG July 28, 2021 normal sinus rhythm\par Blood pressure controlled on current medication\par ROS: Denies Chest pain, dyspnea, palpitations, dizziness or syncope.\par \par

## 2021-08-02 NOTE — ASSESSMENT
[FreeTextEntry1] : 75 year old female with history of right sided breast cancer-> s/p ACT and radiation. Hypertension, hyperlipidemia and obesity.  Status post mild symptomatic Covid.currently feeling well\par \par Blood pressure controlled on current medication continue with low-salt diet and blood pressure monitoring.\par \par Cardio oncology post chemotherapy echocardiogram is normal from March 2021\par \par Diabetes relatively controlled on current medication\par \par Cholesterol continue with statin therapy\par \par Port is pending removal.\par \par Follow-up 4 to 6 months

## 2021-08-03 DIAGNOSIS — Z45.2 ENCOUNTER FOR ADJUSTMENT AND MANAGEMENT OF VASCULAR ACCESS DEVICE: ICD-10-CM

## 2021-08-20 ENCOUNTER — APPOINTMENT (OUTPATIENT)
Dept: MRI IMAGING | Facility: IMAGING CENTER | Age: 76
End: 2021-08-20
Payer: MEDICARE

## 2021-08-20 ENCOUNTER — OUTPATIENT (OUTPATIENT)
Dept: OUTPATIENT SERVICES | Facility: HOSPITAL | Age: 76
LOS: 1 days | End: 2021-08-20
Payer: MEDICARE

## 2021-08-20 DIAGNOSIS — Z92.21 PERSONAL HISTORY OF ANTINEOPLASTIC CHEMOTHERAPY: Chronic | ICD-10-CM

## 2021-08-20 DIAGNOSIS — Z90.711 ACQUIRED ABSENCE OF UTERUS WITH REMAINING CERVICAL STUMP: Chronic | ICD-10-CM

## 2021-08-20 DIAGNOSIS — Z90.5 ACQUIRED ABSENCE OF KIDNEY: Chronic | ICD-10-CM

## 2021-08-20 DIAGNOSIS — R90.89 OTHER ABNORMAL FINDINGS ON DIAGNOSTIC IMAGING OF CENTRAL NERVOUS SYSTEM: ICD-10-CM

## 2021-08-20 DIAGNOSIS — Z98.891 HISTORY OF UTERINE SCAR FROM PREVIOUS SURGERY: Chronic | ICD-10-CM

## 2021-08-20 PROCEDURE — 70553 MRI BRAIN STEM W/O & W/DYE: CPT | Mod: 26

## 2021-08-20 PROCEDURE — 70553 MRI BRAIN STEM W/O & W/DYE: CPT

## 2021-08-23 ENCOUNTER — APPOINTMENT (OUTPATIENT)
Dept: NEUROLOGY | Facility: CLINIC | Age: 76
End: 2021-08-23
Payer: MEDICARE

## 2021-08-23 PROCEDURE — 99214 OFFICE O/P EST MOD 30 MIN: CPT

## 2021-08-23 NOTE — HISTORY OF PRESENT ILLNESS
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 \par \par She had odd sensations in her mouth preceding the diagnosis of breast cancer. These sensations were atypical for olfactory seizures in that she had them after eating very hot soup or hot pizza and the sensation was a persistence of the odd burning feeling. She reports trying brushing her teeth, using lots of mouthwash, and other manouvers to resolve the sensations, but nothing seems to help. She has noticed that when she is hyperglycemic, her mouth becomes dry and the sensations re-appear.\par \par There are no complaints of weakness, visual change, alfonso seizure or other seizure-like activity.\par \par The unpleasant tastes had improved substantially at her last visit with me. These are mostly constant but were worsened after a sunburn and when she cleaned out her basement.

## 2021-08-23 NOTE — DISCUSSION/SUMMARY
[FreeTextEntry1] : SEIZURES -- as her dysgeusia did not improve with keppra in the past, so we stopped it. It did not worse following AED cessation.\par \par ABNORMAL BRAIN MRI -- Probable glioma, but difficult region to biopsy to be certain as morbidity from such a procedure is high. It is reassuring that there is no POD. \par \par DISPO -- We will see her back in the office in one year with repeat MRI with contrast. I suspect interval stability.

## 2021-08-23 NOTE — DATA REVIEWED
[de-identified] : I personally reviewed neuroimaging dated\par 1/27/2019	9/19/2019	8/3/2020	8/20/2021\par \par In reviewing these images, I find STABLE POSTERIOR LEFT THALAMIC HYPERINTENSITY on the T2 weighted images, with signal change likely representing a low grade glioma, cerebrovascular disease felt less likely. \par On the contrast enhanced images, there is no abnormal enhancement.\par DWI imaging shows no acute CVA.\par Overall I find the images to be stable.\par Selected imaging was provided to the patient, along with a detailed verbal explanation of the issues at hand as outlined above.\par

## 2021-09-08 ENCOUNTER — RX RENEWAL (OUTPATIENT)
Age: 76
End: 2021-09-08

## 2021-09-19 ENCOUNTER — APPOINTMENT (OUTPATIENT)
Dept: INTERNAL MEDICINE | Facility: CLINIC | Age: 76
End: 2021-09-19
Payer: MEDICARE

## 2021-09-19 ENCOUNTER — LABORATORY RESULT (OUTPATIENT)
Age: 76
End: 2021-09-19

## 2021-09-19 VITALS
DIASTOLIC BLOOD PRESSURE: 80 MMHG | HEIGHT: 64.96 IN | SYSTOLIC BLOOD PRESSURE: 124 MMHG | RESPIRATION RATE: 16 BRPM | OXYGEN SATURATION: 98 % | HEART RATE: 73 BPM | BODY MASS INDEX: 32.65 KG/M2 | WEIGHT: 196 LBS

## 2021-09-19 DIAGNOSIS — Z20.828 CONTACT WITH AND (SUSPECTED) EXPOSURE TO OTHER VIRAL COMMUNICABLE DISEASES: ICD-10-CM

## 2021-09-19 PROCEDURE — 36415 COLL VENOUS BLD VENIPUNCTURE: CPT

## 2021-09-19 PROCEDURE — 99214 OFFICE O/P EST MOD 30 MIN: CPT | Mod: 25

## 2021-09-19 NOTE — HISTORY OF PRESENT ILLNESS
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 \par \par Had tested covid ab positive 4/20 - reports that she has had recurrent sx every other mo.  Mild, better and resolved with otc rx x1.  \par Had been planning on having repeat reconstructive surgery.  \par Has been followed with cardiology. Has been maintained on current rx.  Had meds adjusted for el bp.\par following with oncology closely - has followed up since last visit.\par following with derm - \par Has followed up with neurology since last visit.   \par s/p port removal\par \par Some headache after getting the covid booster.  With headache since then - has been improving.  simmilar to when she had covid.\par Has not been checking FS glucose.    \par BP has been controlled, started on low dose rx with cardiology as noted. \par appetite has been about the same - fair. Has been following better diet.\par Walking and going on the bike for exercise.  Has been daily.  Was not using after getting the port out - has been walking daily.\par Aches have been better.\par Repots some ? numbness at the feet.  \par Ear and nose have been better.  taste has been better   \par \par mammo/sono - Br Ca as noted.\par pap overdue.  Has not been going - s/p hysterectomy\par \par fit 7/21 - \par Had flu vaccine\par had prevnar\par Pneumovax - [facility] - ~ 2018\par Had covid vaccine x2 - had booster\par Reprots zoster vaccine ` 2014

## 2021-09-19 NOTE — REVIEW OF SYSTEMS
[Negative] : Heme/Lymph [Fever] : no fever [Chills] : no chills [Hot Flashes] : no hot flashes [Fatigue] : no fatigue [Night Sweats] : no night sweats [Recent Change In Weight] : ~T no recent weight change [FreeTextEntry7] : altered taste from the chemo - 90% better [FreeTextEntry9] : aches improved as noted.

## 2021-09-19 NOTE — HEALTH RISK ASSESSMENT
[Yes] : Yes [Monthly or less (1 pt)] : Monthly or less (1 point) [1 or 2 (0 pts)] : 1 or 2 (0 points) [Never (0 pts)] : Never (0 points) [No] : In the past 12 months have you used drugs other than those required for medical reasons? No [0] : 2) Feeling down, depressed, or hopeless: Not at all (0) [PHQ-2 Negative - No further assessment needed] : PHQ-2 Negative - No further assessment needed [] : No [Audit-CScore] : 1 [[id number]] : 0

## 2021-09-30 ENCOUNTER — OUTPATIENT (OUTPATIENT)
Dept: OUTPATIENT SERVICES | Facility: HOSPITAL | Age: 76
LOS: 1 days | Discharge: HOME | End: 2021-09-30

## 2021-09-30 DIAGNOSIS — Z98.891 HISTORY OF UTERINE SCAR FROM PREVIOUS SURGERY: Chronic | ICD-10-CM

## 2021-09-30 DIAGNOSIS — C50.919 MALIGNANT NEOPLASM OF UNSPECIFIED SITE OF UNSPECIFIED FEMALE BREAST: ICD-10-CM

## 2021-09-30 DIAGNOSIS — Z90.711 ACQUIRED ABSENCE OF UTERUS WITH REMAINING CERVICAL STUMP: Chronic | ICD-10-CM

## 2021-09-30 DIAGNOSIS — Z92.21 PERSONAL HISTORY OF ANTINEOPLASTIC CHEMOTHERAPY: Chronic | ICD-10-CM

## 2021-09-30 DIAGNOSIS — Z90.5 ACQUIRED ABSENCE OF KIDNEY: Chronic | ICD-10-CM

## 2021-10-05 ENCOUNTER — APPOINTMENT (OUTPATIENT)
Dept: HEMATOLOGY ONCOLOGY | Facility: CLINIC | Age: 76
End: 2021-10-05
Payer: MEDICARE

## 2021-10-05 VITALS
OXYGEN SATURATION: 98 % | BODY MASS INDEX: 34.1 KG/M2 | WEIGHT: 194.89 LBS | DIASTOLIC BLOOD PRESSURE: 78 MMHG | HEIGHT: 63.39 IN | SYSTOLIC BLOOD PRESSURE: 148 MMHG | HEART RATE: 68 BPM | RESPIRATION RATE: 16 BRPM | TEMPERATURE: 97.4 F

## 2021-10-05 PROCEDURE — 99214 OFFICE O/P EST MOD 30 MIN: CPT

## 2021-10-13 NOTE — ASU PREOP CHECKLIST - SITE MARKED BY ANESTHESIOLOGIST
Patient discussed with Ingris about going on a blood pressure medication. Patient decided she would like to try.  Pharmacy confirmed  
Rn spoke with pt who stated that she would like to go on BP medication what was discussed at her last visit 10/06/2021. Pt stated that she thought the medication started with an \"x\" but was unsure. RN spoke with NP who stated that it was hctz. RN sending message back to NP for script, pharmacy loaded.       
Script for hydrochlorothiazide 12.5mg daily sent to her pharmacy. She can come in for a blood pressure check in about a month if she is agreeable.   
Script for hydrochlorothiazide 12.5mg daily sent to her pharmacy. She can come in for a blood pressure check in about a month if she is agreeable.     Writer called and spoke with patient, conveyed MD information. No questions.    
n/a

## 2021-10-14 ENCOUNTER — APPOINTMENT (OUTPATIENT)
Dept: INFUSION THERAPY | Facility: HOSPITAL | Age: 76
End: 2021-10-14

## 2021-10-14 ENCOUNTER — APPOINTMENT (OUTPATIENT)
Dept: HEMATOLOGY ONCOLOGY | Facility: CLINIC | Age: 76
End: 2021-10-14

## 2021-10-17 NOTE — HISTORY OF PRESENT ILLNESS
[Disease: _____________________] : Disease: [unfilled] [T: ___] : T[unfilled] [N: ___] : N[unfilled] [AJCC Stage: ____] : AJCC Stage: [unfilled] [de-identified] : 74 y/o female who presents for medical oncology follow up. \par \par She saw her PMD who sent her for a screening mammogram (Last mammogram )  after she palpated a mass on the R side of her chest 2018. She had been having body aches/soreness and feeling general malaise since November, despite a course of antibiotics (just after flu shot administered). Also some R shoulder/arm pains. She has no prior history of abnormal breast imaging, breast biopsies, or surgeries.\par \par 18 Mammogram screenin.7 cm irregular spiculated mass upper outer right breast 8 cm FN with associated architectural distortion and several associated microcalcifications. Partially included enlarged right axillary lymph nodes. BIRADS 0\par \par 18 bilateral breast US: R breast 10:00 7 cm FN 2.6 cm irregular hypoechoic mass (biopsy recommended). Inferior right axillary lymph node with focal area of eccentric cortical thickening (recommend US guided biopsy). 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 [place] 7/9. 1.4cm involved, DCIS cribiform pattern with high grade nuclear atypia and apocrine cytology very focally present. (coil shaped clip) R axillary lymph node: metastatic ductal carcinoma with apocrine cytology involving a lymph node (hydromark marking clip) ER 0% MA 0% HER2 IHC 0 negative\par \par She saw Dr. Demetrio Robertson who recommended medical oncology consultation for neoadjuvant chemotherapy. \par \par She noted on initial visit with me some occasional body aches still and fatigue over the last month. She notes an intermittent tongue sensation - like a scald from food (though she does not recall an episode of this). Symptoms intermittent for months. Otherwise she feels well. She is active at baseline, uses stationary bike at home most days for exercise. \par \par 19 PET/CT: Superolateral R breast and R axillary lymph nodes FDG-avid. S/p left nephrectomy. Few subcm b/l pulmonary nodules are nonspecific. Please correlate clinically for infection and follow up with CT chest in 1 month.\par \par TTE 19: EF 64%, GLS -20 Wall motion abnormality in inferolateral wall reported, EKG stable. Patient seen by Dr. Lorrie Yadav (Cardiology) - TTE reviewed without significant wall motion abnormalities, antihypertensives changed \par \par MRI Brain w/wo contrast 19: L maxillary polyp v. retention cyst. Non enhancing area of abnormal T2 prolongation involving left thalamic region, nonspecific. Stable on repeat 2019. Following with Dr. Jacobs.\par \par CT Chest 19: No interval changes since 19. Stable 3.3 x 2.1 cm UOQ R breast, 2 x 1.2 cm R axillary lymph node. Stable 4 mm and smaller scattered indeterminant pulmonary nodules.\par \par She started neoadjuvant chemotherapy with AC on 19. Taxol #1 3/27.\par Taxol #2 with grade 3 infusion reaction, vasculitic rash development. Taxol reattempt with similar. Taxotere attempt  with chest discomfort, acute worsening of rash/itching, severe back pain radiating into the chest, a few minutes into infusion. \par Breast US performed with response to therapy noted.\par Also c/b steroid induced hyperglycemia (A1C 8.0) - initiated home glucose monitoring and PMD follow up.\par \par CMF #1 with neulasta onpro started 19 without event; 4 cycles completed.\par \par b/l mastectomy 19 (Dr. Robertson): R breast: IDC, mod diff, 5.1mm (residual disease ranging in size from 1-2mm in tumor bed, approx 10-20% viable cells in 1.9x1.5x1.0cm tumor bed post-neoadjuvant treatment), DCIS grade 2-3 (4mm, 5/14 blocks), 2/4 sentinel nodes with ITCs on permanent section only. Margins negative\par [id number](i+)\par ER-MA-HER2 IHC 0 negative, PDL <1%.\par \par She completed post-mastectomy RT c/b radiation dermatitis now resolved.\par She started adjuvant xeloda for residual disease (2000mg PO BID 2 weeks on, 1 week off) in Oct 2019. C1 xeloda with skin cracking, q2 diarrhea, decreased dose, completed nearly 7 cycles, held in light of COVID19 pandemic with high risk given close + contacts and per patient's request. Xeloda completed 2020. \par \par No family history of breast or ovarian cancer. [de-identified] : ER-MA-HER2- [de-identified] : \par Transfer of care from Dr. Carla Rodgers\par patient's best contact home number: [phone], [phone]\par On active surveillance\par MMG - 9/2019 BIRAD2, s/p b/l mastectomy\par Not following with surgeon Dr. Robertson any longer\par Had mediport removed in 7/2021\par Recent labs reviewed 9/19/21\par \par HEALTH MAINTENANCE\par PMD Lance Lafleur, PMD up to date, last seen 9/19/21, mgt of DM, sees q3mos.  Good energy level. \par Cardiologist 8/2018, stress test normal per patient, Dr. Samaniego [facility], sees him annually. BP well controlled generally, now following with Dr. Yadav Oncocardiology; follow up Dr. Yadav as scheduled, last seen 3/24/21, Echo on 3/24/21 - EF 65%; fu in 3/2022\par DEXA - She never had one. Not interested in it, discussed\par GI Colonoscopy - Had one within 10 years ago but does not remember when it was done and how it was, reportedly no polyps. Stool occult blood screening 6/2021 with PMD negative, advised follow up with Dr. Lafleur\par BCC of skin removed, Dr. 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 Hale.\par GYN Pap smear - s/p CASSIE for fibroid uterus in 1985, declines further follow up\par RENAL Single kidney (kidney donor to brother in 1982) - baseline Cr 1.2-1.3 though patient notes no prior renal issues\par LIVER Transaminitis AST 43, ALT 54 (12/28/18), no prior history, resolved since beginning treatment\par NEURO - Dr Jacobs; MR Head, 8/3/20- Re-demonstration 1.7 x 2.9 x 1.7 cm AP, TR, CC left thalamic pulvinar enlargement with T2, FLAIR hyperintensity, no rCBV or rCBF increase, differential includes low-grade neoplasm or atypical hamartoma, without interval change from prior.  Unchanged mild volume loss, microvascular disease, no new restricted diffusion, hemorrhage or midline shift. Last follow up with Dr. Jacobs 8/23/21, rec FU in 1 year with MRI brain\par MRI brain 8/23/21 stable. \par Single, lives with her son Richard. She has a daughter as well who works at [employer]. She is a retired RN.\par She notes she has exercising on her bike.  Walks a lot. \par Had COVID exposure, asx COVID infection.  \par s/p COVID vaccine and COVID booster in 8/2021

## 2021-10-17 NOTE — ASSESSMENT
[FreeTextEntry1] : 76 y/o woman with history of HTN on multiple medications, kidney donor/CKD, ER-ME-HER2-, lymph-node positive right breast cancer anatomic stage IIB, AJCC 8th edition clinical prognostic stage IIIB, on neoadjuvant chemotherapy (dose-dense AC-->T) presenting for follow up. Course c/b neuropathy and a new rash after taxol, infusion reactions to both taxol and taxotere very quickly into infusion initiation. CT chest for cough 4/2019 with stable 3mm pulm nodules, decreased size of breast mass and axillary LN; US 4/17 with decrease in size of breast mass and axillary LN. She completed 4 cycles of CMF with OnPro q2 weeks. 7/11/19 b/l mastectomy with residual disease, negative margins (T1bN0(i+)Mx). S/p adjuvant radiation and started on adjuvant Xeloda (2000mg PO BID, 2 weeks on, 1 week off).  Now completed, doing well.\par \par -on active surveillance\par -clinically JOSE DANIEL\par -fnot following with surgeon any longer\par -follow up radiation oncology as advised\par -reviewed recent labs from 9/19/21\par -FU in 6mos

## 2021-10-27 ENCOUNTER — APPOINTMENT (OUTPATIENT)
Dept: DERMATOLOGY | Facility: CLINIC | Age: 76
End: 2021-10-27
Payer: MEDICARE

## 2021-10-27 VITALS — BODY MASS INDEX: 32.32 KG/M2 | HEIGHT: 65 IN | WEIGHT: 194 LBS

## 2021-10-27 DIAGNOSIS — L82.1 OTHER SEBORRHEIC KERATOSIS: ICD-10-CM

## 2021-10-27 PROCEDURE — 99213 OFFICE O/P EST LOW 20 MIN: CPT

## 2021-12-06 ENCOUNTER — RX RENEWAL (OUTPATIENT)
Age: 76
End: 2021-12-06

## 2022-01-09 ENCOUNTER — APPOINTMENT (OUTPATIENT)
Dept: INTERNAL MEDICINE | Facility: CLINIC | Age: 77
End: 2022-01-09
Payer: MEDICARE

## 2022-01-09 ENCOUNTER — LABORATORY RESULT (OUTPATIENT)
Age: 77
End: 2022-01-09

## 2022-01-09 VITALS
OXYGEN SATURATION: 98 % | DIASTOLIC BLOOD PRESSURE: 80 MMHG | SYSTOLIC BLOOD PRESSURE: 130 MMHG | HEART RATE: 71 BPM | HEIGHT: 65 IN | RESPIRATION RATE: 16 BRPM | WEIGHT: 196 LBS | BODY MASS INDEX: 32.65 KG/M2

## 2022-01-09 PROCEDURE — 36415 COLL VENOUS BLD VENIPUNCTURE: CPT

## 2022-01-09 PROCEDURE — 99214 OFFICE O/P EST MOD 30 MIN: CPT | Mod: 25

## 2022-01-09 PROCEDURE — G0444 DEPRESSION SCREEN ANNUAL: CPT | Mod: 59

## 2022-01-09 NOTE — HEALTH RISK ASSESSMENT
[Never] : Never [Yes] : Yes [Monthly or less (1 pt)] : Monthly or less (1 point) [1 or 2 (0 pts)] : 1 or 2 (0 points) [Never (0 pts)] : Never (0 points) [No] : In the past 12 months have you used drugs other than those required for medical reasons? No [0] : 2) Feeling down, depressed, or hopeless: Not at all (0) [PHQ-2 Negative - No further assessment needed] : PHQ-2 Negative - No further assessment needed [Audit-CScore] : 1 [[id number]] : 0

## 2022-01-09 NOTE — PHYSICAL EXAM
[Normal Posterior Cervical Nodes] : no posterior cervical lymphadenopathy [Normal Anterior Cervical Nodes] : no anterior cervical lymphadenopathy [Normal] : affect was normal and insight and judgment were intact [Right Foot Was Examined] : Right foot ~C was examined [Left Foot Was Examined] : left foot ~C was examined [None] : no ulcers in either foot were found [] : both feet [TWNoteComboBox4] : +2

## 2022-01-09 NOTE — HISTORY OF PRESENT ILLNESS
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 \par \par Had tested covid ab positive 4/20 - reports that she has had recurrent sx every other mo.  Mild, better and resolved with otc rx x1.  \par Had been planning on having repeat reconstructive surgery.  \par Has been followed with cardiology. Has been maintained on current rx.  Had meds adjusted for el bp.\par following with oncology closely - has followed up since last visit.  note reviewed.  Was started on vit D.\par following with derm - recent note reviewed.\par Has followed up with neurology since last visit.   \par s/p port removal\par \par Some headache after getting the covid booster.  With headache since then - has been improving.  similar to when she had covid.\par Has not been checking FS glucose.    \par BP has been controlled, started on low dose rx with cardiology as noted. \par appetite has been about the same - fair. Has been following better diet.\par Walking and going on the bike for exercise.  Has been daily.  Was not using after getting the port out - has been walking daily.  Has been feeling strong.\par Aches have been better.\par Repots some ? numbness at the feet.  \par Ear and nose have been better.  taste has been better   \par \par mammo/sono - Br Ca as noted.\par pap overdue.  Has not been going - s/p hysterectomy\par \par fit 7/21 - \par Had flu vaccine\par had prevnar\par Pneumovax - [facility] - ~ 2018\par Had covid vaccine x2 - had booster\par Reprots zoster vaccine ` 2014

## 2022-01-09 NOTE — REVIEW OF SYSTEMS
[Negative] : Heme/Lymph [Fever] : no fever [Chills] : no chills [Fatigue] : no fatigue [Hot Flashes] : no hot flashes [Night Sweats] : no night sweats [Recent Change In Weight] : ~T no recent weight change [FreeTextEntry7] : altered taste from the chemo - 90% better [FreeTextEntry9] : aches improved as noted.

## 2022-01-15 ENCOUNTER — OUTPATIENT (OUTPATIENT)
Dept: OUTPATIENT SERVICES | Facility: HOSPITAL | Age: 77
LOS: 1 days | Discharge: HOME | End: 2022-01-15

## 2022-01-15 DIAGNOSIS — Z90.5 ACQUIRED ABSENCE OF KIDNEY: Chronic | ICD-10-CM

## 2022-01-15 DIAGNOSIS — C50.919 MALIGNANT NEOPLASM OF UNSPECIFIED SITE OF UNSPECIFIED FEMALE BREAST: ICD-10-CM

## 2022-01-15 DIAGNOSIS — Z92.21 PERSONAL HISTORY OF ANTINEOPLASTIC CHEMOTHERAPY: Chronic | ICD-10-CM

## 2022-01-15 DIAGNOSIS — Z90.711 ACQUIRED ABSENCE OF UTERUS WITH REMAINING CERVICAL STUMP: Chronic | ICD-10-CM

## 2022-01-15 DIAGNOSIS — Z98.891 HISTORY OF UTERINE SCAR FROM PREVIOUS SURGERY: Chronic | ICD-10-CM

## 2022-01-18 ENCOUNTER — APPOINTMENT (OUTPATIENT)
Dept: HEMATOLOGY ONCOLOGY | Facility: CLINIC | Age: 77
End: 2022-01-18
Payer: MEDICARE

## 2022-01-18 VITALS
TEMPERATURE: 97.2 F | BODY MASS INDEX: 32.32 KG/M2 | OXYGEN SATURATION: 98 % | RESPIRATION RATE: 16 BRPM | HEIGHT: 65 IN | DIASTOLIC BLOOD PRESSURE: 93 MMHG | SYSTOLIC BLOOD PRESSURE: 154 MMHG | HEART RATE: 69 BPM | WEIGHT: 193.98 LBS

## 2022-01-18 PROCEDURE — 99214 OFFICE O/P EST MOD 30 MIN: CPT

## 2022-01-18 RX ORDER — AZITHROMYCIN 250 MG/1
250 TABLET, FILM COATED ORAL
Qty: 6 | Refills: 0 | Status: DISCONTINUED | COMMUNITY
Start: 2021-10-20

## 2022-01-18 RX ORDER — NYSTATIN 100000 1/G
100000 POWDER TOPICAL
Qty: 30 | Refills: 2 | Status: DISCONTINUED | COMMUNITY
Start: 2021-10-05 | End: 2022-01-18

## 2022-01-18 NOTE — PHYSICAL EXAM
[Fully active, able to carry on all pre-disease performance without restriction] : Status 0 - Fully active, able to carry on all pre-disease performance without restriction [Normal] : affect appropriate [de-identified] : b/l mastectomies with skin outpouching laterally, no palpable masses or adenopathy, unchanged; no erythema [de-identified] : many moles over skin UE/LEs

## 2022-01-18 NOTE — HISTORY OF PRESENT ILLNESS
[Disease: _____________________] : Disease: [unfilled] [T: ___] : T[unfilled] [N: ___] : N[unfilled] [AJCC Stage: ____] : AJCC Stage: [unfilled] [de-identified] : 74 y/o female who presents for medical oncology follow up. \par \par She saw her PMD who sent her for a screening mammogram (Last mammogram )  after she palpated a mass on the R side of her chest 2018. She had been having body aches/soreness and feeling general malaise since November, despite a course of antibiotics (just after flu shot administered). Also some R shoulder/arm pains. She has no prior history of abnormal breast imaging, breast biopsies, or surgeries.\par \par 18 Mammogram screenin.7 cm irregular spiculated mass upper outer right breast 8 cm FN with associated architectural distortion and several associated microcalcifications. Partially included enlarged right axillary lymph nodes. BIRADS 0\par \par 18 bilateral breast US: R breast 10:00 7 cm FN 2.6 cm irregular hypoechoic mass (biopsy recommended). Inferior right axillary lymph node with focal area of eccentric cortical thickening (recommend US guided biopsy). 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 [place] 7/9. 1.4cm involved, DCIS cribiform pattern with high grade nuclear atypia and apocrine cytology very focally present. (coil shaped clip) R axillary lymph node: metastatic ductal carcinoma with apocrine cytology involving a lymph node (hydromark marking clip) ER 0% RI 0% HER2 IHC 0 negative\par \par She saw Dr. Demetrio Robertson who recommended medical oncology consultation for neoadjuvant chemotherapy. \par \par She noted on initial visit with me some occasional body aches still and fatigue over the last month. She notes an intermittent tongue sensation - like a scald from food (though she does not recall an episode of this). Symptoms intermittent for months. Otherwise she feels well. She is active at baseline, uses stationary bike at home most days for exercise. \par \par 19 PET/CT: Superolateral R breast and R axillary lymph nodes FDG-avid. S/p left nephrectomy. Few subcm b/l pulmonary nodules are nonspecific. Please correlate clinically for infection and follow up with CT chest in 1 month.\par \par TTE 19: EF 64%, GLS -20 Wall motion abnormality in inferolateral wall reported, EKG stable. Patient seen by Dr. Lorrie Yadav (Cardiology) - TTE reviewed without significant wall motion abnormalities, antihypertensives changed \par \par MRI Brain w/wo contrast 19: L maxillary polyp v. retention cyst. Non enhancing area of abnormal T2 prolongation involving left thalamic region, nonspecific. Stable on repeat 2019. Following with Dr. Jacobs.\par \par CT Chest 19: No interval changes since 19. Stable 3.3 x 2.1 cm UOQ R breast, 2 x 1.2 cm R axillary lymph node. Stable 4 mm and smaller scattered indeterminant pulmonary nodules.\par \par She started neoadjuvant chemotherapy with AC on 19. Taxol #1 3/27.\par Taxol #2 with grade 3 infusion reaction, vasculitic rash development. Taxol reattempt with similar. Taxotere attempt  with chest discomfort, acute worsening of rash/itching, severe back pain radiating into the chest, a few minutes into infusion. \par Breast US performed with response to therapy noted.\par Also c/b steroid induced hyperglycemia (A1C 8.0) - initiated home glucose monitoring and PMD follow up.\par \par CMF #1 with neulasta onpro started 19 without event; 4 cycles completed.\par \par b/l mastectomy 19 (Dr. Robertson): R breast: IDC, mod diff, 5.1mm (residual disease ranging in size from 1-2mm in tumor bed, approx 10-20% viable cells in 1.9x1.5x1.0cm tumor bed post-neoadjuvant treatment), DCIS grade 2-3 (4mm, 5/14 blocks), 2/4 sentinel nodes with ITCs on permanent section only. Margins negative\par [id number](i+)\par ER-RI-HER2 IHC 0 negative, PDL <1%.\par \par She completed post-mastectomy RT c/b radiation dermatitis now resolved.\par She started adjuvant xeloda for residual disease (2000mg PO BID 2 weeks on, 1 week off) in Oct 2019. C1 xeloda with skin cracking, q2 diarrhea, decreased dose, completed nearly 7 cycles, held in light of COVID19 pandemic with high risk given close + contacts and per patient's request. Xeloda completed 2020. \par \par No family history of breast or ovarian cancer. [de-identified] : ER-WV-HER2- [de-identified] : 1/18/2022\par Patient returns today for followup for history of breast cancer and to rule out metastatic breast cancer.\par Patient denies any SOB, CP, abdominal pain, bone pain or headache.\par Patient denies fever, chills, nausea/vomiting, diarrhea/constipation, headache.\par Patient feels very well and has excellent stamina \par On active surveillance\par \par Not following with surgeon Dr. Robertson any longer\par Had mediport removed in 7/2021\par Recent labs reviewed 1/2022\par \par HEALTH MAINTENANCE\par PMD Lance Lafleur, PMD up to date, last seen 1/2022, mgt of DM, sees q3mos.  Good energy level. \par Cardiologist 8/2018, stress test normal per patient, Dr. Sam[facility]go [facility], sees him annually. BP well controlled generally, now following with Dr. Yadav Oncocardiology; follow up Dr. Yadav as scheduled, last seen 3/24/21, Echo on 3/24/21 - EF 65%; fu in 3/2022\par DEXA - She never had one. Not interested in it, discussed\par GI Colonoscopy - Had one within 10 years ago but does not remember when it was done and how it was, reportedly no polyps. Stool occult blood screening 6/2021 with PMD negative, advised follow up with Dr. Lafleur; patient also notes she had cologard in 2021\par BCC of skin removed, Dr. 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 Hale.\par GYN Pap smear - s/p CASSIE for fibroid uterus in 1985, declines further follow up\par RENAL Single kidney (kidney donor to brother in 1982) - baseline Cr 1.2-1.3 though patient notes no prior renal issues\par LIVER Transaminitis AST 43, ALT 54 (12/28/18), no prior history, resolved since beginning treatment\par NEURO - Dr Jacobs; MR Head, 8/3/20- Re-demonstration 1.7 x 2.9 x 1.7 cm AP, TR, CC left thalamic pulvinar enlargement with T2, FLAIR hyperintensity, no rCBV or rCBF increase, differential includes low-grade neoplasm or atypical hamartoma, without interval change from prior.  Unchanged mild volume loss, microvascular disease, no new restricted diffusion, hemorrhage or midline shift. Last follow up with Dr. Jacobs 8/23/21, rec FU in 1 year with MRI brain\par MRI brain 8/23/21 stable. \par Single, lives with her son Richard. She has a daughter as well who works at [employer]. She is a retired RN.\par She notes she has exercising on her bike.  Walks a lot. \par Had COVID exposure, asx COVID infection.  \par s/p COVID vaccine and COVID booster in 8/2021

## 2022-01-18 NOTE — ASSESSMENT
[FreeTextEntry1] : 1/2019: 76 y/o woman with history of HTN on multiple medications, kidney donor/CKD, ER-WV-HER2-, lymph-node positive right breast cancer anatomic stage IIB, AJCC 8th edition clinical prognostic stage IIIB, on neoadjuvant chemotherapy (dose-dense AC-->T) presenting for follow up. Course c/b neuropathy and a new rash after taxol, infusion reactions to both taxol and taxotere very quickly into infusion initiation. CT chest for cough 4/2019 with stable 3mm pulm nodules, decreased size of breast mass and axillary LN; US 4/17 with decrease in size of breast mass and axillary LN. She completed 4 cycles of CMF with OnPro q2 weeks. 7/11/19 b/l mastectomy with residual disease, negative margins (T1bN0(i+)Mx). S/p adjuvant radiation and started on adjuvant Xeloda (2000mg PO BID, 2 weeks on, 1 week off).  Now completed, doing well.\par \par -on active surveillance\par -clinically JOSE DANIEL x 3 years\par -not following with surgeon any longer\par -follow up radiation oncology as advised\par -reviewed recent labs from 1/2022\par -FU in 6mos

## 2022-02-22 ENCOUNTER — RX RENEWAL (OUTPATIENT)
Age: 77
End: 2022-02-22

## 2022-02-22 RX ORDER — FLUTICASONE PROPIONATE 50 UG/1
50 SPRAY, METERED NASAL
Qty: 48 | Refills: 1 | Status: ACTIVE | COMMUNITY
Start: 2020-11-10 | End: 1900-01-01

## 2022-03-11 LAB
25(OH)D3 SERPL-MCNC: 51.5 NG/ML
ALBUMIN SERPL ELPH-MCNC: 4.5 G/DL
ALBUMIN SERPL ELPH-MCNC: 4.8 G/DL
ALP BLD-CCNC: 64 U/L
ALP BLD-CCNC: 66 U/L
ALT SERPL-CCNC: 15 U/L
ALT SERPL-CCNC: 17 U/L
ANION GAP SERPL CALC-SCNC: 16 MMOL/L
ANION GAP SERPL CALC-SCNC: 19 MMOL/L
APPEARANCE: ABNORMAL
AST SERPL-CCNC: 14 U/L
AST SERPL-CCNC: 15 U/L
BILIRUB SERPL-MCNC: 0.2 MG/DL
BILIRUB SERPL-MCNC: 0.4 MG/DL
BILIRUBIN URINE: NEGATIVE
BLOOD URINE: NEGATIVE
BUN SERPL-MCNC: 27 MG/DL
BUN SERPL-MCNC: 29 MG/DL
CALCIUM SERPL-MCNC: 10.3 MG/DL
CALCIUM SERPL-MCNC: 9.3 MG/DL
CHLORIDE SERPL-SCNC: 102 MMOL/L
CHLORIDE SERPL-SCNC: 99 MMOL/L
CHOLEST SERPL-MCNC: 171 MG/DL
CHOLEST SERPL-MCNC: 189 MG/DL
CO2 SERPL-SCNC: 21 MMOL/L
CO2 SERPL-SCNC: 26 MMOL/L
COLOR: NORMAL
COVID-19 SPIKE DOMAIN ANTIBODY INTERPRETATION: POSITIVE
CREAT SERPL-MCNC: 1.16 MG/DL
CREAT SERPL-MCNC: 1.36 MG/DL
CREAT SPEC-SCNC: 132 MG/DL
ESTIMATED AVERAGE GLUCOSE: 146 MG/DL
ESTIMATED AVERAGE GLUCOSE: 157 MG/DL
GLUCOSE QUALITATIVE U: NEGATIVE
GLUCOSE SERPL-MCNC: 132 MG/DL
GLUCOSE SERPL-MCNC: 132 MG/DL
HBA1C MFR BLD HPLC: 6.7 %
HBA1C MFR BLD HPLC: 7.1 %
HDLC SERPL-MCNC: 67 MG/DL
HDLC SERPL-MCNC: 73 MG/DL
KETONES URINE: NEGATIVE
LDLC SERPL CALC-MCNC: 72 MG/DL
LDLC SERPL CALC-MCNC: 92 MG/DL
LEUKOCYTE ESTERASE URINE: ABNORMAL
MICROALBUMIN 24H UR DL<=1MG/L-MCNC: 1.6 MG/DL
MICROALBUMIN/CREAT 24H UR-RTO: 12 MG/G
NITRITE URINE: NEGATIVE
NONHDLC SERPL-MCNC: 103 MG/DL
NONHDLC SERPL-MCNC: 116 MG/DL
PH URINE: 5
POTASSIUM SERPL-SCNC: 4.2 MMOL/L
POTASSIUM SERPL-SCNC: 4.4 MMOL/L
PROT SERPL-MCNC: 6.4 G/DL
PROT SERPL-MCNC: 7 G/DL
PROTEIN URINE: NEGATIVE
SARS-COV-2 AB SERPL IA-ACNC: >250 U/ML
SODIUM SERPL-SCNC: 139 MMOL/L
SODIUM SERPL-SCNC: 144 MMOL/L
SPECIFIC GRAVITY URINE: 1.02
TRIGL SERPL-MCNC: 121 MG/DL
TRIGL SERPL-MCNC: 159 MG/DL
UROBILINOGEN URINE: NORMAL

## 2022-04-06 ENCOUNTER — NON-APPOINTMENT (OUTPATIENT)
Age: 77
End: 2022-04-06

## 2022-04-06 ENCOUNTER — APPOINTMENT (OUTPATIENT)
Dept: CARDIOLOGY | Facility: CLINIC | Age: 77
End: 2022-04-06
Payer: MEDICARE

## 2022-04-06 VITALS — SYSTOLIC BLOOD PRESSURE: 127 MMHG | OXYGEN SATURATION: 96 % | DIASTOLIC BLOOD PRESSURE: 83 MMHG | HEART RATE: 76 BPM

## 2022-04-06 PROCEDURE — 93000 ELECTROCARDIOGRAM COMPLETE: CPT

## 2022-04-06 PROCEDURE — 99214 OFFICE O/P EST MOD 30 MIN: CPT

## 2022-04-06 NOTE — HISTORY OF PRESENT ILLNESS
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 \par radiation treatment for 25 cycles to be completed feb 22. 2020\par Xeloda twice daily for two weeks and one week off treatment in rotation.\par S/P bilateral mastectomy without reconstruction.   \par \par Interval Note February 19, 2020.\par on capecitabine\par scheduled for a MOHS procedure with Dr.Jessica Johnson  to excise an atypical intradermal melanocytic proliferation on her right forearm. \par \par Repeat echo was performed today.\par \par Interval follow up 9/2/2020\par onc notes reviewed in October 2020\par s/p asx covid +\par s/p treatment, RT\par awaiting plastics reconstruction \par exercising stationary bike 45 mins a day\par ROS: Denies Chest pain, dyspnea, palpitations, dizziness or syncope.\par \par Interval Note March 24, 2021\par \par Patient returns for a routine cardiac follow up. 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 BMI  31\par \par S/P ACT; Completed all neoadjuvant chemotherapy in July 2019. \par Radiation treatment for 25 cycles to be completed Feb 22. 2020\par Xeloda twice daily for two weeks and one week off treatment in rotation.\par S/P bilateral mastectomy without reconstruction.   \par \par Patient discontinued treatment last March 2020 per oncology during the start of the COVID pandemic.\par Of note, patient had COVID virus in April of 2020 with mild symptoms.\par \par She has received the first vaccination of Moderna last week.\par Blood pressure today is elevated. She reports that she just took her morning medication. \par \par Echocardiogram was performed this morning: \par Normal LV function\par Reduced global strain when compared to previous study\par \par Interval follow-up July 28, 2021\par Pending port removal this week\par EKG July 28, 2021 normal sinus rhythm\par Blood pressure controlled on current medication\par ROS: Denies Chest pain, dyspnea, palpitations, dizziness or syncope.\par \par Interval Note\par April 6, 2022\par \par Ms Moran is now 76 years old with the following history:\par -S/P  breast cancer:  ER -  [place] -   Her 2/  Adalgisa- right  breast cancer.\par -S/P radiation therapy\par -S/P medi port removal  -July, 2021\par -hypertension controlled \par -Hyperlipidemia contolled\par -diabetes\par \par \par walking 4 miles /day\par ROS: Denies Chest pain, dyspnea, palpitations, dizziness or syncope.\par \par \par

## 2022-04-06 NOTE — ASSESSMENT
[FreeTextEntry1] : 76 year old female with history of right sided breast cancer-> s/p ACT and radiation. Hypertension, hyperlipidemia and obesity.  Status post mild symptomatic Covid.currently feeling well. \par \par Blood pressure controlled on current medication continue with low-salt diet and blood pressure monitoring.\par \par Cardio oncology post chemotherapy echocardiogram is normal from March 2021.Echo orderd for post chemo evaluation .\par \par Diabetes relatively controlled on current medication\par \par Cholesterol continue with statin therapy\par \par - Primary prevention of cardiovascular-related conditions discussed at length, including but not limited to diet and lifestyle modification.  \par - F/U 6  mth \par \par

## 2022-04-06 NOTE — REASON FOR VISIT
[Other: ____] : [unfilled] [Follow-Up - Clinic] : a clinic follow-up of [Symptom and Test Evaluation] : symptom and test evaluation

## 2022-04-27 ENCOUNTER — OUTPATIENT (OUTPATIENT)
Dept: OUTPATIENT SERVICES | Facility: HOSPITAL | Age: 77
LOS: 1 days | Discharge: HOME | End: 2022-04-27

## 2022-04-27 ENCOUNTER — APPOINTMENT (OUTPATIENT)
Dept: DERMATOLOGY | Facility: CLINIC | Age: 77
End: 2022-04-27
Payer: MEDICARE

## 2022-04-27 DIAGNOSIS — Z98.891 HISTORY OF UTERINE SCAR FROM PREVIOUS SURGERY: Chronic | ICD-10-CM

## 2022-04-27 DIAGNOSIS — Z90.711 ACQUIRED ABSENCE OF UTERUS WITH REMAINING CERVICAL STUMP: Chronic | ICD-10-CM

## 2022-04-27 DIAGNOSIS — C50.919 MALIGNANT NEOPLASM OF UNSPECIFIED SITE OF UNSPECIFIED FEMALE BREAST: ICD-10-CM

## 2022-04-27 DIAGNOSIS — Z92.21 PERSONAL HISTORY OF ANTINEOPLASTIC CHEMOTHERAPY: Chronic | ICD-10-CM

## 2022-04-27 DIAGNOSIS — Z90.5 ACQUIRED ABSENCE OF KIDNEY: Chronic | ICD-10-CM

## 2022-04-27 PROCEDURE — 17000 DESTRUCT PREMALG LESION: CPT

## 2022-04-27 PROCEDURE — 99213 OFFICE O/P EST LOW 20 MIN: CPT | Mod: 25

## 2022-05-01 ENCOUNTER — APPOINTMENT (OUTPATIENT)
Dept: INTERNAL MEDICINE | Facility: CLINIC | Age: 77
End: 2022-05-01
Payer: MEDICARE

## 2022-05-01 VITALS
HEART RATE: 80 BPM | RESPIRATION RATE: 16 BRPM | HEIGHT: 65 IN | DIASTOLIC BLOOD PRESSURE: 80 MMHG | OXYGEN SATURATION: 97 % | SYSTOLIC BLOOD PRESSURE: 120 MMHG

## 2022-05-01 DIAGNOSIS — Z78.9 OTHER SPECIFIED HEALTH STATUS: ICD-10-CM

## 2022-05-01 PROCEDURE — 99214 OFFICE O/P EST MOD 30 MIN: CPT | Mod: 25

## 2022-05-01 PROCEDURE — 36415 COLL VENOUS BLD VENIPUNCTURE: CPT

## 2022-05-01 NOTE — COUNSELING
[Encouraged to increase physical activity] : Encouraged to increase physical activity [None] : None [Good understanding] : Patient has a good understanding of lifestyle changes and steps needed to achieve self management goal [Potential consequences of obesity discussed] : Potential consequences of obesity discussed [Benefits of weight loss discussed] : Benefits of weight loss discussed [Decrease Portions] : decrease portions

## 2022-05-01 NOTE — HISTORY OF PRESENT ILLNESS
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 \par \par Had tested covid ab positive 4/20 - reports that she has had recurrent sx every other mo.  Mild, better and resolved with otc rx x1.  \par Had been planning on having repeat reconstructive surgery.  \par Has been followed with cardiology. Has been maintained on current rx.  Had meds adjusted for el bp.\par following with oncology closely - has followed up since last visit.  note reviewed.  Was started on vit D.\par following with derm - recent note reviewed.\par s/p port removal\par Following with neurology\par \par Recent cardiology, onc and derm notes reviewed.  \par \par Some headache after getting the covid booster.  she will be going for the second booster.\par Has not been checking FS glucose.    \par BP has been controlled, started on low dose rx with cardiology - stable.\par appetite has been about the same - fair. Has been following better diet.\par Walking and going on the bike for exercise.  Has been daily.  Was not using after getting the port out - has been walking daily.  Has been feeling strong.\par Aches have been better.\par Repots some ? numbness at the feet.  \par Ear and nose have been better.  taste has been better   \par \par mammo/sono - Br Ca as noted.\par pap overdue.  Has not been going - s/p hysterectomy\par \par fit 7/21 - \par Had flu vaccine\par had prevnar\par Pneumovax - [facility] - ~ 2018\par Had covid vaccine x2 - had booster\par Reprots zoster vaccine ` 2014

## 2022-05-01 NOTE — PHYSICAL EXAM
[Normal Posterior Cervical Nodes] : no posterior cervical lymphadenopathy [Normal Anterior Cervical Nodes] : no anterior cervical lymphadenopathy [Normal] : affect was normal and insight and judgment were intact [Right Foot Was Examined] : Right foot ~C was examined [Left Foot Was Examined] : left foot ~C was examined [None] : no ulcers in either foot were found [] : both feet [de-identified] : obese [TWNoteComboBox4] : +2

## 2022-05-05 LAB
25(OH)D3 SERPL-MCNC: 53.1 NG/ML
ALBUMIN SERPL ELPH-MCNC: 4.5 G/DL
ALP BLD-CCNC: 57 U/L
ALT SERPL-CCNC: 16 U/L
ANION GAP SERPL CALC-SCNC: 15 MMOL/L
AST SERPL-CCNC: 13 U/L
BASOPHILS # BLD AUTO: 0.02 K/UL
BASOPHILS NFR BLD AUTO: 0.3 %
BILIRUB SERPL-MCNC: 0.4 MG/DL
BUN SERPL-MCNC: 29 MG/DL
CALCIUM SERPL-MCNC: 9.6 MG/DL
CHLORIDE SERPL-SCNC: 103 MMOL/L
CHOLEST SERPL-MCNC: 178 MG/DL
CO2 SERPL-SCNC: 23 MMOL/L
CREAT SERPL-MCNC: 1.32 MG/DL
EGFR: 42 ML/MIN/1.73M2
EOSINOPHIL # BLD AUTO: 0.18 K/UL
EOSINOPHIL NFR BLD AUTO: 3 %
ESTIMATED AVERAGE GLUCOSE: 151 MG/DL
FRUCTOSAMINE SERPL-MCNC: 258 UMOL/L
GLUCOSE SERPL-MCNC: 203 MG/DL
HBA1C MFR BLD HPLC: 6.9 %
HCT VFR BLD CALC: 38.5 %
HDLC SERPL-MCNC: 66 MG/DL
HGB BLD-MCNC: 12.4 G/DL
IMM GRANULOCYTES NFR BLD AUTO: 0.2 %
LDLC SERPL CALC-MCNC: 92 MG/DL
LYMPHOCYTES # BLD AUTO: 1.14 K/UL
LYMPHOCYTES NFR BLD AUTO: 19.2 %
MAN DIFF?: NORMAL
MCHC RBC-ENTMCNC: 29.6 PG
MCHC RBC-ENTMCNC: 32.2 GM/DL
MCV RBC AUTO: 91.9 FL
MONOCYTES # BLD AUTO: 0.37 K/UL
MONOCYTES NFR BLD AUTO: 6.2 %
NEUTROPHILS # BLD AUTO: 4.21 K/UL
NEUTROPHILS NFR BLD AUTO: 71.1 %
NONHDLC SERPL-MCNC: 113 MG/DL
PLATELET # BLD AUTO: 227 K/UL
POTASSIUM SERPL-SCNC: 4 MMOL/L
PROT SERPL-MCNC: 6.5 G/DL
RBC # BLD: 4.19 M/UL
RBC # FLD: 13.7 %
SODIUM SERPL-SCNC: 141 MMOL/L
TRIGL SERPL-MCNC: 102 MG/DL
WBC # FLD AUTO: 5.93 K/UL

## 2022-06-08 ENCOUNTER — NON-APPOINTMENT (OUTPATIENT)
Age: 77
End: 2022-06-08

## 2022-06-29 ENCOUNTER — OUTPATIENT (OUTPATIENT)
Dept: OUTPATIENT SERVICES | Facility: HOSPITAL | Age: 77
LOS: 1 days | Discharge: HOME | End: 2022-06-29

## 2022-06-29 DIAGNOSIS — C50.919 MALIGNANT NEOPLASM OF UNSPECIFIED SITE OF UNSPECIFIED FEMALE BREAST: ICD-10-CM

## 2022-06-29 DIAGNOSIS — Z98.891 HISTORY OF UTERINE SCAR FROM PREVIOUS SURGERY: Chronic | ICD-10-CM

## 2022-06-29 DIAGNOSIS — Z92.21 PERSONAL HISTORY OF ANTINEOPLASTIC CHEMOTHERAPY: Chronic | ICD-10-CM

## 2022-06-29 DIAGNOSIS — Z90.5 ACQUIRED ABSENCE OF KIDNEY: Chronic | ICD-10-CM

## 2022-06-29 DIAGNOSIS — Z90.711 ACQUIRED ABSENCE OF UTERUS WITH REMAINING CERVICAL STUMP: Chronic | ICD-10-CM

## 2022-07-12 ENCOUNTER — APPOINTMENT (OUTPATIENT)
Dept: HEMATOLOGY ONCOLOGY | Facility: CLINIC | Age: 77
End: 2022-07-12

## 2022-07-12 VITALS
HEART RATE: 75 BPM | OXYGEN SATURATION: 97 % | RESPIRATION RATE: 16 BRPM | DIASTOLIC BLOOD PRESSURE: 70 MMHG | SYSTOLIC BLOOD PRESSURE: 104 MMHG | BODY MASS INDEX: 31.95 KG/M2 | WEIGHT: 192 LBS | TEMPERATURE: 97.8 F

## 2022-07-12 DIAGNOSIS — Z00.00 ENCOUNTER FOR GENERAL ADULT MEDICAL EXAMINATION W/OUT ABNORMAL FINDINGS: ICD-10-CM

## 2022-07-12 PROCEDURE — 99214 OFFICE O/P EST MOD 30 MIN: CPT

## 2022-07-12 NOTE — PHYSICAL EXAM
[Fully active, able to carry on all pre-disease performance without restriction] : Status 0 - Fully active, able to carry on all pre-disease performance without restriction [Normal] : affect appropriate [de-identified] : b/l mastectomies with skin outpouching laterally, no palpable masses or adenopathy, unchanged; no erythema [de-identified] : many moles over skin UE/LEs

## 2022-07-12 NOTE — HISTORY OF PRESENT ILLNESS
[Disease: _____________________] : Disease: [unfilled] [T: ___] : T[unfilled] [N: ___] : N[unfilled] [AJCC Stage: ____] : AJCC Stage: [unfilled] [de-identified] : She saw her PMD who sent her for a screening mammogram (Last mammogram )  after she palpated a mass on the R side of her chest 2018. She had been having body aches/soreness and feeling general malaise since November, despite a course of antibiotics (just after flu shot administered). Also some R shoulder/arm pains. She has no prior history of abnormal breast imaging, breast biopsies, or surgeries.\par \par 18 Mammogram screenin.7 cm irregular spiculated mass upper outer right breast 8 cm FN with associated architectural distortion and several associated microcalcifications. Partially included enlarged right axillary lymph nodes. BIRADS 0\par \par 18 bilateral breast US: R breast 10:00 7 cm FN 2.6 cm irregular hypoechoic mass (biopsy recommended). Inferior right axillary lymph node with focal area of eccentric cortical thickening (recommend US guided biopsy). 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 Charlotte 7/. 1.4cm involved, DCIS cribiform pattern with high grade nuclear atypia and apocrine cytology very focally present. (coil shaped clip) R axillary lymph node: metastatic ductal carcinoma with apocrine cytology involving a lymph node (hydromark marking clip) ER 0% CA 0% HER2 IHC 0 negative\par \par She saw Dr. Demetrio Robertson who recommended medical oncology consultation for neoadjuvant chemotherapy. \par \par She noted on initial visit with me some occasional body aches still and fatigue over the last month. She notes an intermittent tongue sensation - like a scald from food (though she does not recall an episode of this). Symptoms intermittent for months. Otherwise she feels well. She is active at baseline, uses stationary bike at home most days for exercise. \par \par 19 PET/CT: Superolateral R breast and R axillary lymph nodes FDG-avid. S/p left nephrectomy. Few subcm b/l pulmonary nodules are nonspecific. Please correlate clinically for infection and follow up with CT chest in 1 month.  MRI Brain w/wo contrast 19: L maxillary polyp v. retention cyst. Non enhancing area of abnormal T2 prolongation involving left thalamic region, nonspecific. Stable on repeat 2019. Following with Dr. Jacobs.  CT Chest 19: No interval changes since 19. Stable 3.3 x 2.1 cm UOQ R breast, 2 x 1.2 cm R axillary lymph node. Stable 4 mm and smaller scattered indeterminant pulmonary nodules.\par \par She started neoadjuvant chemotherapy with AC on 19. Taxol #1 3/27.\par Taxol #2 with grade 3 infusion reaction, vasculitic rash development. Taxol reattempt with similar. Taxotere attempt  with chest discomfort, acute worsening of rash/itching, severe back pain radiating into the chest, a few minutes into infusion. \par Breast US performed with response to therapy noted.\par Also c/b steroid induced hyperglycemia (A1C 8.0) - initiated home glucose monitoring and PMD follow up.\par \par CMF #1 with neulasta onpro started 19 without event; 4 cycles completed.\par \par b/l mastectomy 19 (Dr. Robertson): R breast: IDC, mod diff, 5.1mm (residual disease ranging in size from 1-2mm in tumor bed, approx 10-20% viable cells in 1.9x1.5x1.0cm tumor bed post-neoadjuvant treatment), DCIS grade 2-3 (4mm, 5/14 blocks), 2/4 sentinel nodes with ITCs on permanent section only. Margins negative\par [id number](i+)\par ER-CA-HER2 IHC 0 negative, PDL <1%.\par \par She completed post-mastectomy RT c/b radiation dermatitis now resolved.\par \par She started adjuvant xeloda for residual disease (2000mg PO BID 2 weeks on, 1 week off) in Oct 2019. C1 xeloda with skin cracking, q2 diarrhea, decreased dose, completed nearly 7 cycles, held in light of COVID19 pandemic with high risk given close + contacts and per patient's request. Xeloda completed 2020. \par \par No family history of breast or ovarian cancer. [de-identified] : ER-WV-HER2- [de-identified] : 7/12/22\par On active surveillance\par Denies any chest wall masses or skin change. \par Had COVID infection april, had adverse reaction to COVID booster 5/2022\par Had significant diarrhea secondary to increased dose to metformin; now improved after lowering dose\par Had mechanical fall while picking up dog on 6/8, fell on concrete; injured elbows/knees/wrists and fractured nasal bone; no head injury; went to [facility]\par Not following with surgeon Dr. Robertson any longer\par Recent labs reviewed 5/2022\par \par HEALTH MAINTENANCE\par PMD Lance Lafleur, PMD up to date, last seen 1/2022, mgt of DM, sees q3mos.  Good energy level. \par CARDS Dr. Lorrie Yadav; Echo on 3/24/21 - EF 65%; fu in 3/2022\par DEXA - She never had one. Not interested in it, discussed\par GI Colonoscopy - Had one within 10 years ago but does not remember when it was done and how it was, reportedly no polyps. Stool occult blood screening 6/2021 with PMD negative, advised follow up with Dr. Lafleur; patient also notes she had cologard in 2021\par BCC of skin removed, Dr. 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 Hale.\par GYN Pap smear - s/p CASSIE for fibroid uterus in 1985, declines further follow up\par RENAL Single kidney (kidney donor to brother in 1982) - baseline Cr 1.2-1.3 though patient notes no prior renal issues\par LIVER Transaminitis AST 43, ALT 54 (12/28/18), no prior history, resolved since beginning treatment\par NEURO - Dr Jacobs; MR Head, 8/3/20- Re-demonstration 1.7 x 2.9 x 1.7 cm AP, TR, CC left thalamic pulvinar enlargement with T2, FLAIR hyperintensity, no rCBV or rCBF increase, differential includes low-grade neoplasm or atypical hamartoma, without interval change from prior.  Unchanged mild volume loss, microvascular disease, no new restricted diffusion, hemorrhage or midline shift. Last follow up with Dr. Jacobs 8/23/21, rec FU in 1 year with MRI brain. MRI brain 8/23/21 stable. \par Single, lives with her son Richard. She has a daughter as well who works at [employer]. She is a retired RN.\par She notes she has exercising on her bike.  Walks a lot. \par Had COVID exposure, asx COVID infection.  tested covid ab positive 4/20\par s/p COVID vaccine and COVID booster in 8/2021\par retired RN

## 2022-07-12 NOTE — ASSESSMENT
[FreeTextEntry1] : 1/2019: 75 y/o woman with history of HTN on multiple medications, kidney donor/CKD, ER-MO-HER2-, lymph-node positive right breast cancer anatomic stage IIB, AJCC 8th edition clinical prognostic stage IIIB, on neoadjuvant chemotherapy (dose-dense AC-->T) presenting for follow up. Course c/b neuropathy and a new rash after taxol, infusion reactions to both taxol and taxotere very quickly into infusion initiation. CT chest for cough 4/2019 with stable 3mm pulm nodules, decreased size of breast mass and axillary LN; US 4/17 with decrease in size of breast mass and axillary LN. She completed 4 cycles of CMF with OnPro q2 weeks. 7/11/19 b/l mastectomy with residual disease, negative margins (T1bN0(i+)Mx). S/p adjuvant radiation and started on adjuvant Xeloda (2000mg PO BID, 2 weeks on, 1 week off).  Now completed, doing well.\par \par -on active surveillance\par -clinically JOSE DANIEL x 3.5 years\par -not following with surgeon any longer\par -follow up radiation oncology as advised\par -reviewed recent labs from 1/2022\par -recent labs reviewed\par -FU in 6mos

## 2022-08-24 ENCOUNTER — APPOINTMENT (OUTPATIENT)
Dept: MRI IMAGING | Facility: IMAGING CENTER | Age: 77
End: 2022-08-24

## 2022-08-24 ENCOUNTER — OUTPATIENT (OUTPATIENT)
Dept: OUTPATIENT SERVICES | Facility: HOSPITAL | Age: 77
LOS: 1 days | End: 2022-08-24
Payer: MEDICARE

## 2022-08-24 DIAGNOSIS — Z00.8 ENCOUNTER FOR OTHER GENERAL EXAMINATION: ICD-10-CM

## 2022-08-24 DIAGNOSIS — R90.89 OTHER ABNORMAL FINDINGS ON DIAGNOSTIC IMAGING OF CENTRAL NERVOUS SYSTEM: ICD-10-CM

## 2022-08-24 DIAGNOSIS — Z98.891 HISTORY OF UTERINE SCAR FROM PREVIOUS SURGERY: Chronic | ICD-10-CM

## 2022-08-24 DIAGNOSIS — Z90.711 ACQUIRED ABSENCE OF UTERUS WITH REMAINING CERVICAL STUMP: Chronic | ICD-10-CM

## 2022-08-24 DIAGNOSIS — Z92.21 PERSONAL HISTORY OF ANTINEOPLASTIC CHEMOTHERAPY: Chronic | ICD-10-CM

## 2022-08-24 DIAGNOSIS — Z90.5 ACQUIRED ABSENCE OF KIDNEY: Chronic | ICD-10-CM

## 2022-08-24 PROCEDURE — 70553 MRI BRAIN STEM W/O & W/DYE: CPT

## 2022-08-24 PROCEDURE — 70553 MRI BRAIN STEM W/O & W/DYE: CPT | Mod: 26

## 2022-08-24 PROCEDURE — A9585: CPT

## 2022-09-01 ENCOUNTER — APPOINTMENT (OUTPATIENT)
Dept: NEUROLOGY | Facility: CLINIC | Age: 77
End: 2022-09-01

## 2022-09-01 ENCOUNTER — NON-APPOINTMENT (OUTPATIENT)
Age: 77
End: 2022-09-01

## 2022-09-01 PROCEDURE — 99214 OFFICE O/P EST MOD 30 MIN: CPT

## 2022-09-01 NOTE — DATA REVIEWED
[de-identified] : I personally reviewed MR imaging dated\par 1/27/2019	8/3/2020	8/20/2021	8/24/2022\par \par In reviewing these images, I find STABLE LEFT THALAMIC HYPERINTENSITY on the T2 weighted images, with signal THAT REMAINS UNCHANGED. THERE IS NO enhancement. DWI imaging is unremarkable.\par Overall I find the images to be stable and nondiagnostic. This could represent an indolent glioma. \par Selected imaging was provided to the patient, along with a detailed verbal explanation of the issues at hand as outlined above.\par

## 2022-09-01 NOTE — HISTORY OF PRESENT ILLNESS
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 \par \par She had odd sensations in her mouth preceding the diagnosis of breast cancer. These sensations were atypical for olfactory seizures in that she had them after eating very hot soup or hot pizza and the sensation was a persistence of the odd burning feeling. She reports trying brushing her teeth, using lots of mouthwash, and other manouvers to resolve the sensations, but nothing seems to help. She has noticed that when she is hyperglycemic, her mouth becomes dry and the sensations re-appear.\par \par There are no complaints of weakness, visual change, alfonso seizure or other seizure-like activity.\par \par The unpleasant tastes returned briefly during a COVID infection in May 2022, then resolved and remained absent.\par \par She has no other complaints. No other seizure-like spells. No focal neurologic deficits.

## 2022-09-01 NOTE — DISCUSSION/SUMMARY
[FreeTextEntry1] : ABNORMAL MRI -- I remain unsure what this T2 abnormality represents. It could be a glioma, albeit inactive and nonenhancing, suggesting either low grade disease. It could be an autoimmune reaction to the COVID infection, as has been increasingly reported, albeit somewhat early for that disease.\par \par SEIZURES -- None. We previously tried AEDs to see if her smell would improve, which it did not on AEDs, but did spontaneously resolve and remain stable later.\par \par DISPO -- I think annual surveillance with contrast enhanced imaging is safest.

## 2022-09-04 ENCOUNTER — APPOINTMENT (OUTPATIENT)
Dept: INTERNAL MEDICINE | Facility: CLINIC | Age: 77
End: 2022-09-04

## 2022-09-04 VITALS
WEIGHT: 192 LBS | BODY MASS INDEX: 31.99 KG/M2 | DIASTOLIC BLOOD PRESSURE: 62 MMHG | HEART RATE: 78 BPM | HEIGHT: 65 IN | SYSTOLIC BLOOD PRESSURE: 114 MMHG | OXYGEN SATURATION: 97 % | RESPIRATION RATE: 16 BRPM

## 2022-09-04 DIAGNOSIS — U07.1 COVID-19: ICD-10-CM

## 2022-09-04 PROCEDURE — 99214 OFFICE O/P EST MOD 30 MIN: CPT | Mod: 25

## 2022-09-04 PROCEDURE — 36415 COLL VENOUS BLD VENIPUNCTURE: CPT

## 2022-09-04 RX ORDER — METFORMIN HYDROCHLORIDE 850 MG/1
850 TABLET, COATED ORAL
Qty: 180 | Refills: 0 | Status: COMPLETED | COMMUNITY
Start: 2022-08-26

## 2022-09-04 NOTE — PHYSICAL EXAM
[Normal Posterior Cervical Nodes] : no posterior cervical lymphadenopathy [Normal Anterior Cervical Nodes] : no anterior cervical lymphadenopathy [Normal] : affect was normal and insight and judgment were intact [de-identified] : obese [TWNoteComboBox4] : +2

## 2022-09-04 NOTE — HISTORY OF PRESENT ILLNESS
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 \par \par Had tested covid ab positive 4/20 - reports that she has had recurrent sx every other mo.  Mild, better and resolved with otc rx x1.  \par Had been planning on having repeat reconstructive surgery.  \par Has been followed with cardiology. Has been maintained on current rx.  Had meds adjusted for el bp.\par following with oncology closely - has followed up since last visit.  note reviewed.  \par following with derm - \par s/p port removal\par Following with neurology - recent f/u note reviewed.  to follow with annual MRI\par \par s/p mechanical fall causes by her dog with injuries in June.\par diarrhea - had with the inc dose of metformin.  Better with holding the med.  Has been having the sx again - gas and diarrhea.  \par \par Some headache after getting the covid booster.  she will be going for the second booster.  Reports that she had reaction to the second booster.  \par Has not been checking FS glucose.  \par BP has been controlled, started on low dose rx with cardiology - stable.\par appetite has been about the same - fair. Has been following better diet.  With issues from the ? adverse affects of the med.\par Walking and going on the bike for exercise.  Has been daily.  Was not using after getting the port out - has been walking daily.  Has been feeling strong.\par Aches have been better.\par Repots some ? numbness at the feet.  \par Ear and nose have been better.  taste has been better   \par \par mammo/sono - Br Ca as noted.\par pap overdue.  Has not been going - s/p hysterectomy\par \par fit 7/21 - \par Had flu vaccine\par had prevnar\par Pneumovax - [facility] - ~ 2018\par Had covid vaccine x2 - had booster\par Reprots zoster vaccine ` 2014

## 2022-09-04 NOTE — HEALTH RISK ASSESSMENT
[Never] : Never [Yes] : Yes [Monthly or less (1 pt)] : Monthly or less (1 point) [1 or 2 (0 pts)] : 1 or 2 (0 points) [Never (0 pts)] : Never (0 points) [No] : In the past 12 months have you used drugs other than those required for medical reasons? No [0] : 2) Feeling down, depressed, or hopeless: Not at all (0) [PHQ-2 Negative - No further assessment needed] : PHQ-2 Negative - No further assessment needed [Audit-CScore] : 1 [[id number]] : 0

## 2022-09-05 LAB
25(OH)D3 SERPL-MCNC: 62.1 NG/ML
ALBUMIN SERPL ELPH-MCNC: 4.5 G/DL
ALP BLD-CCNC: 57 U/L
ALT SERPL-CCNC: 12 U/L
ANION GAP SERPL CALC-SCNC: 15 MMOL/L
AST SERPL-CCNC: 15 U/L
BILIRUB SERPL-MCNC: 0.3 MG/DL
BUN SERPL-MCNC: 32 MG/DL
CALCIUM SERPL-MCNC: 10 MG/DL
CHLORIDE SERPL-SCNC: 103 MMOL/L
CHOLEST SERPL-MCNC: 171 MG/DL
CO2 SERPL-SCNC: 23 MMOL/L
CREAT SERPL-MCNC: 1.27 MG/DL
EGFR: 44 ML/MIN/1.73M2
ESTIMATED AVERAGE GLUCOSE: 148 MG/DL
GLUCOSE SERPL-MCNC: 152 MG/DL
HBA1C MFR BLD HPLC: 6.8 %
HDLC SERPL-MCNC: 64 MG/DL
LDLC SERPL CALC-MCNC: 84 MG/DL
MAGNESIUM SERPL-MCNC: 1.6 MG/DL
NONHDLC SERPL-MCNC: 108 MG/DL
POTASSIUM SERPL-SCNC: 4.4 MMOL/L
PROT SERPL-MCNC: 6.8 G/DL
SODIUM SERPL-SCNC: 140 MMOL/L
TRIGL SERPL-MCNC: 122 MG/DL

## 2022-09-19 ENCOUNTER — APPOINTMENT (OUTPATIENT)
Dept: INTERNAL MEDICINE | Facility: CLINIC | Age: 77
End: 2022-09-19

## 2022-09-19 VITALS
SYSTOLIC BLOOD PRESSURE: 126 MMHG | RESPIRATION RATE: 16 BRPM | DIASTOLIC BLOOD PRESSURE: 76 MMHG | OXYGEN SATURATION: 97 % | HEART RATE: 68 BPM | HEIGHT: 65 IN | BODY MASS INDEX: 31.99 KG/M2 | WEIGHT: 192 LBS

## 2022-09-19 PROCEDURE — 90662 IIV NO PRSV INCREASED AG IM: CPT

## 2022-09-19 PROCEDURE — G0008: CPT

## 2022-09-19 PROCEDURE — 99213 OFFICE O/P EST LOW 20 MIN: CPT | Mod: 25

## 2022-09-19 RX ORDER — SITAGLIPTIN 50 MG/1
50 TABLET, FILM COATED ORAL
Qty: 30 | Refills: 3 | Status: DISCONTINUED | COMMUNITY
Start: 2022-09-04 | End: 2022-09-19

## 2022-09-19 NOTE — PHYSICAL EXAM
[Normal Posterior Cervical Nodes] : no posterior cervical lymphadenopathy [Normal Anterior Cervical Nodes] : no anterior cervical lymphadenopathy [Normal] : affect was normal and insight and judgment were intact [de-identified] : obese

## 2022-09-19 NOTE — HISTORY OF PRESENT ILLNESS
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 \par \par Had tested covid ab positive 4/20 - reports that she has had recurrent sx every other mo. Mild, better and resolved with otc rx x1. \par Had been planning on having repeat reconstructive surgery. \par Has been followed with cardiology. Has been maintained on current rx. Had meds adjusted for el bp.\par following with oncology closely - has followed up since last visit. note reviewed. \par following with derm - \par s/p port removal\par Following with neurology. to follow with annual MRI\par \par Reports that she has had episodes of symptomatic hypoglycemia after taking the januvia.  asking about stopping the rx or reducing the rx.\par otherwise has been okay.

## 2022-09-19 NOTE — HEALTH RISK ASSESSMENT
[Never] : Never [Yes] : Yes [Monthly or less (1 pt)] : Monthly or less (1 point) [1 or 2 (0 pts)] : 1 or 2 (0 points) [Never (0 pts)] : Never (0 points) [No] : In the past 12 months have you used drugs other than those required for medical reasons? No [0] : 2) Feeling down, depressed, or hopeless: Not at all (0) [PHQ-2 Negative - No further assessment needed] : PHQ-2 Negative - No further assessment needed [Audit-CScore] : 1 [[id number]] : 0

## 2022-09-19 NOTE — REVIEW OF SYSTEMS
[Negative] : Heme/Lymph [Fever] : no fever [Chills] : no chills [Fatigue] : no fatigue [Hot Flashes] : no hot flashes [Night Sweats] : no night sweats [Recent Change In Weight] : ~T no recent weight change [FreeTextEntry2] : sweats/diaphoresis with the hypoglycemic episodes [FreeTextEntry7] : altered taste from the chemo - 90% better [FreeTextEntry9] : aches improved as noted.

## 2022-09-26 NOTE — PACU DISCHARGE NOTE - PAIN:
Controlled with current regime Consent (Lip)/Introductory Paragraph: The rationale for Mohs was explained to the patient and consent was obtained. The risks, benefits and alternatives to therapy were discussed in detail. Specifically, the risks of lip deformity, changes in the oral aperture, infection, scarring, bleeding, prolonged wound healing, incomplete removal, allergy to anesthesia, nerve injury and recurrence were addressed. Prior to the procedure, the treatment site was clearly identified and confirmed by the patient. All components of Universal Protocol/PAUSE Rule completed.

## 2022-09-28 ENCOUNTER — APPOINTMENT (OUTPATIENT)
Dept: DERMATOLOGY | Facility: CLINIC | Age: 77
End: 2022-09-28

## 2022-09-28 PROCEDURE — 99213 OFFICE O/P EST LOW 20 MIN: CPT | Mod: 25

## 2022-09-28 PROCEDURE — 17003 DESTRUCT PREMALG LES 2-14: CPT

## 2022-09-28 PROCEDURE — 17000 DESTRUCT PREMALG LESION: CPT

## 2022-10-11 NOTE — REASON FOR VISIT
[Symptom and Test Evaluation] : symptom and test evaluation [Other: ____] : [unfilled] [Follow-Up - Clinic] : a clinic follow-up of

## 2022-10-12 ENCOUNTER — APPOINTMENT (OUTPATIENT)
Dept: CARDIOLOGY | Facility: CLINIC | Age: 77
End: 2022-10-12

## 2022-10-12 ENCOUNTER — NON-APPOINTMENT (OUTPATIENT)
Age: 77
End: 2022-10-12

## 2022-10-12 ENCOUNTER — OUTPATIENT (OUTPATIENT)
Dept: OUTPATIENT SERVICES | Facility: HOSPITAL | Age: 77
LOS: 1 days | End: 2022-10-12
Payer: MEDICARE

## 2022-10-12 ENCOUNTER — APPOINTMENT (OUTPATIENT)
Dept: CV DIAGNOSITCS | Facility: HOSPITAL | Age: 77
End: 2022-10-12

## 2022-10-12 VITALS
HEART RATE: 74 BPM | BODY MASS INDEX: 31.99 KG/M2 | SYSTOLIC BLOOD PRESSURE: 134 MMHG | WEIGHT: 192 LBS | OXYGEN SATURATION: 97 % | HEIGHT: 65 IN | DIASTOLIC BLOOD PRESSURE: 84 MMHG

## 2022-10-12 DIAGNOSIS — Z98.891 HISTORY OF UTERINE SCAR FROM PREVIOUS SURGERY: Chronic | ICD-10-CM

## 2022-10-12 DIAGNOSIS — I10 ESSENTIAL (PRIMARY) HYPERTENSION: ICD-10-CM

## 2022-10-12 DIAGNOSIS — Z92.21 PERSONAL HISTORY OF ANTINEOPLASTIC CHEMOTHERAPY: Chronic | ICD-10-CM

## 2022-10-12 DIAGNOSIS — Z90.711 ACQUIRED ABSENCE OF UTERUS WITH REMAINING CERVICAL STUMP: Chronic | ICD-10-CM

## 2022-10-12 DIAGNOSIS — Z90.5 ACQUIRED ABSENCE OF KIDNEY: Chronic | ICD-10-CM

## 2022-10-12 PROCEDURE — 93306 TTE W/DOPPLER COMPLETE: CPT | Mod: 26

## 2022-10-12 PROCEDURE — 93000 ELECTROCARDIOGRAM COMPLETE: CPT

## 2022-10-12 PROCEDURE — 93306 TTE W/DOPPLER COMPLETE: CPT

## 2022-10-12 PROCEDURE — 99214 OFFICE O/P EST MOD 30 MIN: CPT | Mod: 25

## 2022-10-14 NOTE — PHYSICAL EXAM
[Well Developed] : well developed [No Acute Distress] : no acute distress [Normal Conjunctiva] : normal conjunctiva [Normal Venous Pressure] : normal venous pressure [No Carotid Bruit] : no carotid bruit [Normal S1, S2] : normal S1, S2 [No Murmur] : no murmur [No Rub] : no rub [No Gallop] : no gallop [Clear Lung Fields] : clear lung fields [Good Air Entry] : good air entry [No Respiratory Distress] : no respiratory distress  [Soft] : abdomen soft [Non Tender] : non-tender [No Masses/organomegaly] : no masses/organomegaly [Normal Bowel Sounds] : normal bowel sounds [Normal Gait] : normal gait [No Edema] : no edema [No Cyanosis] : no cyanosis [No Clubbing] : no clubbing [No Varicosities] : no varicosities [No Rash] : no rash [No Skin Lesions] : no skin lesions [Moves all extremities] : moves all extremities [No Focal Deficits] : no focal deficits [Normal Speech] : normal speech [Alert and Oriented] : alert and oriented [Normal memory] : normal memory [Well Nourished] : well nourished [Normal Voice Quality] : was normal [Normal Verbal Skills] : the patient had normal verbal communication skills [Normal Rate] : normal [Rhythm Regular] : regular [Normal S1] : normal S1 [Normal S2] : normal S2 [Normal] : soft, non-tender, no masses/organomegaly, normal bowel sounds

## 2022-10-14 NOTE — HISTORY OF PRESENT ILLNESS
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 \par radiation treatment for 25 cycles to be completed feb 22. 2020\par Xeloda twice daily for two weeks and one week off treatment in rotation.\par S/P bilateral mastectomy without reconstruction.   \par \par Interval Note February 19, 2020.\par on capecitabine\par scheduled for a MOHS procedure with Dr.Jessica Johnson  to excise an atypical intradermal melanocytic proliferation on her right forearm. \par \par Repeat echo was performed today.\par \par Interval follow up 9/2/2020\par onc notes reviewed in October 2020\par s/p asx covid +\par s/p treatment, RT\par awaiting plastics reconstruction \par \par Interval \par exercising stationary bike 45 mins a day\par ROS: Denies Chest pain, dyspnea, palpitations, dizziness or syncope.\par \par Interval Note March 24, 2021\par \par Patient returns for a routine cardiac follow up. 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 BMI  31\par \par S/P ACT; Completed all neoadjuvant chemotherapy in July 2019. \par Radiation treatment for 25 cycles to be completed Feb 22. 2020\par Xeloda twice daily for two weeks and one week off treatment in rotation.\par S/P bilateral mastectomy without reconstruction.   \par \par Patient discontinued treatment last March 2020 per oncology during the start of the COVID pandemic.\par Of note, patient had COVID virus in April of 2020 with mild symptoms.\par \par She has received the first vaccination of Moderna last week.\par Blood pressure today is elevated. She reports that she just took her morning medication. \par \par Echocardiogram was performed this morning: \par Normal LV function\par Reduced global strain when compared to previous study\par \par Interval follow-up July 28, 2021\par Pending port removal this week\par EKG July 28, 2021 normal sinus rhythm\par Blood pressure controlled on current medication\par ROS: Denies Chest pain, dyspnea, palpitations, dizziness or syncope.\par \par Interval Note\par April 6, 2022\par \par Ms Moran is now 76 years old with the following history:\par -S/P  breast cancer:  ER -  [place] -   Her 2/  Adalgisa- right  breast cancer.\par -S/P radiation therapy\par -S/P medi port removal  -July, 2021\par -hypertension controlled \par -Hyperlipidemia controlled\par -diabetes\par \par walking 4 miles /day\par ROS: Denies Chest pain, dyspnea, palpitations, dizziness or syncope.\par \par Interval Note\par October 11, 2022\par \par 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, retired RN with the following history:\par -S/P  breast cancer:  ER -  [place] -   Her 2/  Adalgisa- right  breast cancer.: moderately differentiated ductal CA\par -Hx of bilateral mastectomy-> July 11, 2019-> no reconstruction\par -Hx of Chemotherapy\par -Hx of  radiation therapy\par -S/P medi port removal  -July, 2021\par -Hx of hypertension  \par -Hx of Hyperlipidemia \par -Hx of diabetes\par -Hx of kidney donor\par -Hx of Obesity\par -Hx of COVID- 19 infection in May of 2022\par \par Repeat echocardiogram performed today, October 12, 2022\par Results:\par Normal mitral valve\par Mild-moderate MR\par Normal L internal dimensions and wall thicknesses\par Normal LV systolic function\par No segmental wall motion abnormalities\par Global longitudinal strain -19% (normal)\par Normal diastolic function\par Normal RV size and function \par \par ***Compared with echo from 3/24/21, GLS has improved (prior -15.4%)\par \par Blood pressure today: 134/ 84\par EKG- Sinus rhythm @ 74 bpm\par \par Patient reports feeling well and offers no complaints\par \par \par

## 2022-10-14 NOTE — CARDIOLOGY SUMMARY
[___] : [unfilled] [LVEF ___%] : LVEF [unfilled]% [___] : [unfilled] [de-identified] : 4/6/22 normal sinus rhythm\par 10/12/22- sinus rhythm @ 74 bpm  [de-identified] : 10/12/22\par Normal mitral valve\par Mild-moderate MR\par Normal L internal dimensions and wall thicknesses\par Normal LV systolic function\par No segmental wall motion abnormalities\par Global longitudinal strain -19% (normal)\par Normal diastolic function\par Normal RV size and function \par \par ***Compared with echo from 3/24/21, GLS has improved (prior -15.4%)\par

## 2022-10-14 NOTE — ASSESSMENT
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, retired RN with the following history:\par -S/P  breast cancer:  ER -  [place] -   Her 2/  Adalgisa- right  breast cancer.: moderately differentiated ductal CA\par -Hx of bilateral mastectomy-> July 11, 2019-> no reconstruction\par -Hx of Chemotherapy\par -Hx of  radiation therapy\par -S/P medi port removal  -July, 2021\par -Hx of hypertension  \par -Hx of Hyperlipidemia \par -Hx of diabetes\par -Hx of kidney donor\par -Hx of Obesity\par -Hx of COVID- 19 infection in May of 2022\par \par \par #Hypertension\par   Blood pressure under acceptable control\par   Continue with Amlodipine 2.5 mg QD\par   Continue with Valsartan- HCTZ  320- 25 mg QD\par \par #S/P chemotherapy/radiation\par   Repeat echocardiogram today demonstrated excellent improvements on BB\par   Continue with Carvedilol 25 mg BID\par \par #Hyperlipidemia\par   Reviewed recent lipid panel drawn on September 5, 2022\par   171\par    64\par   84 mg/ dL\par   Excellent lipids \par   Continue on Rosuvastatin 5 mg QHS\par \par #Diabetes\par Hgb A1C 6.8\par  Stable, but remains elevated.\par   Suggested that patient speak with nutritionist, but has deferred\par  Continuing on Metformin  mg BID\par   \par - Encouraged patient to patriciate in walking and healthy eating habits, focusing on a Mediterranean style of eating \par \par - Encouraged the patient to find healthy outlets and coping mechanisms to help manage stress,  spending time with family and friends, engaging in an enjoyable hobby, or using meditation or mindfulness techniques.\par \par \par Follow up in 6 months.\par \par \par \par \par \par \par \par \par \par \par

## 2022-10-24 NOTE — LETTER CLOSING
[Consult Closing:] : Thank you for allowing me to participate in the care of this patient.  If you have any questions, please do not hesitate to contact me. [Sincerely yours,] : Sincerely yours, [FreeTextEntry3] : Vidhi Carrasco MD\par \par  4 = No assist / stand by assistance

## 2022-11-08 ENCOUNTER — NON-APPOINTMENT (OUTPATIENT)
Age: 77
End: 2022-11-08

## 2022-11-19 ENCOUNTER — RX RENEWAL (OUTPATIENT)
Age: 77
End: 2022-11-19

## 2022-12-23 ENCOUNTER — RX RENEWAL (OUTPATIENT)
Age: 77
End: 2022-12-23

## 2023-01-09 ENCOUNTER — APPOINTMENT (OUTPATIENT)
Dept: INTERNAL MEDICINE | Facility: CLINIC | Age: 78
End: 2023-01-09
Payer: MEDICARE

## 2023-01-09 VITALS
WEIGHT: 192 LBS | OXYGEN SATURATION: 98 % | SYSTOLIC BLOOD PRESSURE: 106 MMHG | BODY MASS INDEX: 31.99 KG/M2 | HEIGHT: 65 IN | HEART RATE: 79 BPM | DIASTOLIC BLOOD PRESSURE: 60 MMHG | RESPIRATION RATE: 16 BRPM

## 2023-01-09 VITALS
RESPIRATION RATE: 16 BRPM | HEART RATE: 79 BPM | DIASTOLIC BLOOD PRESSURE: 60 MMHG | SYSTOLIC BLOOD PRESSURE: 106 MMHG | WEIGHT: 192 LBS | BODY MASS INDEX: 31.95 KG/M2 | OXYGEN SATURATION: 98 %

## 2023-01-09 DIAGNOSIS — Z13.31 ENCOUNTER FOR SCREENING FOR DEPRESSION: ICD-10-CM

## 2023-01-09 PROCEDURE — 99214 OFFICE O/P EST MOD 30 MIN: CPT | Mod: 25

## 2023-01-09 PROCEDURE — 36415 COLL VENOUS BLD VENIPUNCTURE: CPT

## 2023-01-09 PROCEDURE — G0444 DEPRESSION SCREEN ANNUAL: CPT | Mod: 59

## 2023-01-09 NOTE — REVIEW OF SYSTEMS
[Negative] : Heme/Lymph [Diarrhea] : diarrhea [Fever] : no fever [Chills] : no chills [Fatigue] : no fatigue [Hot Flashes] : no hot flashes [Night Sweats] : no night sweats [Recent Change In Weight] : ~T no recent weight change [FreeTextEntry2] : sweats/diaphoresis with the hypoglycemic episodes [FreeTextEntry7] : altered taste from the chemo - 90% better [FreeTextEntry9] : aches improved as noted.

## 2023-01-09 NOTE — HISTORY OF PRESENT ILLNESS
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 \par \par Had tested covid ab positive 4/20 - reports that she has had recurrent sx every other mo.  Mild, better and resolved with otc rx x1.  \par Had been planning on having repeat reconstructive surgery.  \par Has been followed with cardiology. Has been maintained on current rx.  recent note reviewed\par following with oncology closely -   \par following with derm - note since last time reviewed.\par s/p port removal\par Following with neurology - \par \par s/p mechanical fall causes by her dog with injuries in June.\par diarrhea - GI sx better with taking once a day.  feeling even better when she dose not take.  \par \par Some headache after getting the covid booster.  she will be going for the second booster.  Reports that she had reaction to the second booster.  \par Has not been checking FS glucose.  \par BP has been controlled.\par appetite has been about the same - fair. Has been following better diet.  With issues from the ? adverse affects of the med.\par Walking and going on the bike for exercise.  Has been daily.  Was not using after getting the port out - has been walking daily.  Has been feeling strong.\par Aches have been better.\par Repots some ? numbness at the feet.  \par Ear and nose have been better.  taste has been better   \par \par mammo/sono - Br Ca as noted.\par pap overdue.  Has not been going - s/p hysterectomy\par \par fit 7/21 - \par Had flu vaccine\par had prevnar\par Pneumovax - [facility] - ~ 2018\par Had covid vaccine x2 - had boosters\par Reprots zoster vaccine ` 2014

## 2023-01-09 NOTE — PHYSICAL EXAM
[Normal Posterior Cervical Nodes] : no posterior cervical lymphadenopathy [Normal Anterior Cervical Nodes] : no anterior cervical lymphadenopathy [Normal] : affect was normal and insight and judgment were intact [de-identified] : obese

## 2023-01-09 NOTE — HEALTH RISK ASSESSMENT
[Never] : Never [Yes] : Yes [Monthly or less (1 pt)] : Monthly or less (1 point) [1 or 2 (0 pts)] : 1 or 2 (0 points) [Never (0 pts)] : Never (0 points) [No] : In the past 12 months have you used drugs other than those required for medical reasons? No [0] : 2) Feeling down, depressed, or hopeless: Not at all (0) [PHQ-2 Negative - No further assessment needed] : PHQ-2 Negative - No further assessment needed [Audit-CScore] : 1 [[id number]] : 0

## 2023-01-16 ENCOUNTER — OUTPATIENT (OUTPATIENT)
Dept: OUTPATIENT SERVICES | Facility: HOSPITAL | Age: 78
LOS: 1 days | Discharge: HOME | End: 2023-01-16

## 2023-01-16 DIAGNOSIS — Z90.5 ACQUIRED ABSENCE OF KIDNEY: Chronic | ICD-10-CM

## 2023-01-16 DIAGNOSIS — Z92.21 PERSONAL HISTORY OF ANTINEOPLASTIC CHEMOTHERAPY: Chronic | ICD-10-CM

## 2023-01-16 DIAGNOSIS — C50.919 MALIGNANT NEOPLASM OF UNSPECIFIED SITE OF UNSPECIFIED FEMALE BREAST: ICD-10-CM

## 2023-01-16 DIAGNOSIS — Z90.711 ACQUIRED ABSENCE OF UTERUS WITH REMAINING CERVICAL STUMP: Chronic | ICD-10-CM

## 2023-01-16 DIAGNOSIS — Z98.891 HISTORY OF UTERINE SCAR FROM PREVIOUS SURGERY: Chronic | ICD-10-CM

## 2023-01-19 ENCOUNTER — APPOINTMENT (OUTPATIENT)
Dept: HEMATOLOGY ONCOLOGY | Facility: CLINIC | Age: 78
End: 2023-01-19
Payer: MEDICARE

## 2023-01-19 VITALS
HEART RATE: 70 BPM | DIASTOLIC BLOOD PRESSURE: 66 MMHG | RESPIRATION RATE: 16 BRPM | OXYGEN SATURATION: 96 % | TEMPERATURE: 97.2 F | HEIGHT: 65 IN | BODY MASS INDEX: 31.99 KG/M2 | SYSTOLIC BLOOD PRESSURE: 107 MMHG | WEIGHT: 192 LBS

## 2023-01-19 PROCEDURE — 99213 OFFICE O/P EST LOW 20 MIN: CPT

## 2023-01-19 NOTE — HISTORY OF PRESENT ILLNESS
[Disease: _____________________] : Disease: [unfilled] [T: ___] : T[unfilled] [N: ___] : N[unfilled] [AJCC Stage: ____] : AJCC Stage: [unfilled] [de-identified] : She saw her PMD who sent her for a screening mammogram (Last mammogram )  after she palpated a mass on the R side of her chest 2018. She had been having body aches/soreness and feeling general malaise since November, despite a course of antibiotics (just after flu shot administered). Also some R shoulder/arm pains. She has no prior history of abnormal breast imaging, breast biopsies, or surgeries.\par \par 18 Mammogram screenin.7 cm irregular spiculated mass upper outer right breast 8 cm FN with associated architectural distortion and several associated microcalcifications. Partially included enlarged right axillary lymph nodes. BIRADS 0\par \par 18 bilateral breast US: R breast 10:00 7 cm FN 2.6 cm irregular hypoechoic mass (biopsy recommended). Inferior right axillary lymph node with focal area of eccentric cortical thickening (recommend US guided biopsy). 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 [place] 7/. 1.4cm involved, DCIS cribiform pattern with high grade nuclear atypia and apocrine cytology very focally present. (coil shaped clip) R axillary lymph node: metastatic ductal carcinoma with apocrine cytology involving a lymph node (hydromark marking clip) ER 0% LA 0% HER2 IHC 0 negative\par \par She saw Dr. Demetrio Robertson who recommended medical oncology consultation for neoadjuvant chemotherapy. \par \par She noted on initial visit with me some occasional body aches still and fatigue over the last month. She notes an intermittent tongue sensation - like a scald from food (though she does not recall an episode of this). Symptoms intermittent for months. Otherwise she feels well. She is active at baseline, uses stationary bike at home most days for exercise. \par \par 19 PET/CT: Superolateral R breast and R axillary lymph nodes FDG-avid. S/p left nephrectomy. Few subcm b/l pulmonary nodules are nonspecific. Please correlate clinically for infection and follow up with CT chest in 1 month.  MRI Brain w/wo contrast 19: L maxillary polyp v. retention cyst. Non enhancing area of abnormal T2 prolongation involving left thalamic region, nonspecific. Stable on repeat 2019. Following with Dr. Jacobs.  CT Chest 19: No interval changes since 19. Stable 3.3 x 2.1 cm UOQ R breast, 2 x 1.2 cm R axillary lymph node. Stable 4 mm and smaller scattered indeterminant pulmonary nodules.\par \par She started neoadjuvant chemotherapy with AC on 19. Taxol #1 3/27.\par Taxol #2 with grade 3 infusion reaction, vasculitic rash development. Taxol reattempt with similar. Taxotere attempt  with chest discomfort, acute worsening of rash/itching, severe back pain radiating into the chest, a few minutes into infusion. \par Breast US performed with response to therapy noted.\par Also c/b steroid induced hyperglycemia (A1C 8.0) - initiated home glucose monitoring and PMD follow up.\par \par CMF #1 with neulasta onpro started 19 without event; 4 cycles completed.\par \par b/l mastectomy 19 (Dr. Robertson): R breast: IDC, mod diff, 5.1mm (residual disease ranging in size from 1-2mm in tumor bed, approx 10-20% viable cells in 1.9x1.5x1.0cm tumor bed post-neoadjuvant treatment), DCIS grade 2-3 (4mm, 5/14 blocks), 2/4 sentinel nodes with ITCs on permanent section only. Margins negative\par [id number](i+)\par ER-LA-HER2 IHC 0 negative, PDL <1%.\par \par She completed post-mastectomy RT c/b radiation dermatitis now resolved.\par \par She started adjuvant xeloda for residual disease (2000mg PO BID 2 weeks on, 1 week off) in Oct 2019. C1 xeloda with skin cracking, q2 diarrhea, decreased dose, completed nearly 7 cycles, held in light of COVID19 pandemic with high risk given close + contacts and per patient's request. Xeloda completed 2020. \par \par No family history of breast or ovarian cancer. [de-identified] : ER-WA-HER2- [de-identified] : 1/19/2023\par Patient returns today for followup for history of breast cancer and to rule out metastatic breast cancer.\par On active surveillance\par Denies any chest wall masses or skin change - patient is very unhappy with results of her mastectomy surgery - had considered plastic surgery, but no longer interested.\par Patient denies any SOB, CP, abdominal pain, bone pain, headache, or unexplained weight loss\par Notes chest wall tightness since surgery, not had PT; not intefering with ADLS\par \par Has significant diarrhea and GI effects related to metformin; working with pcp \par \par Not following with surgeon Dr. Robertson any longer\par Recent labs reviewed 1/2023\par \par HEALTH MAINTENANCE\par PMD Lance Lafleur, PMD up to date, last seen 1/2022, mgt of DM, sees q3mos.  Good energy level. \par CARDS Dr. Lorrie Yadav; Echo annually\par DEXA - She never had one. Not interested in it, discussed\par GI Colonoscopy - Had one within 10 years ago but does not remember when it was done and how it was, reportedly no polyps. Stool occult blood screening 6/2021 with PMD negative, advised follow up with Dr. Lafleur; patient also notes she had cologard in 2021\par BCC of skin removed, Dr. 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 Hale.\par GYN Pap smear - s/p CASSIE for fibroid uterus in 1985, declines further follow up\par RENAL Single kidney (kidney donor to brother in 1982) - baseline Cr 1.2-1.3 though patient notes no prior renal issues\par LIVER Transaminitis AST 43, ALT 54 (12/28/18), no prior history, resolved since beginning treatment\par NEURO - Dr Jacobs; MR Head, 8/3/20- Re-demonstration 1.7 x 2.9 x 1.7 cm AP, TR, CC left thalamic pulvinar enlargement with T2, FLAIR hyperintensity, no rCBV or rCBF increase, differential includes low-grade neoplasm or atypical hamartoma, without interval change from prior.  Unchanged mild volume loss, microvascular disease, no new restricted diffusion, hemorrhage or midline shift. Last follow up with Dr. Jacobs 8/23/21, rec FU in 1 year with MRI brain. MRI brain 8/22 stable. - f/up one year advised; Dr. Branham has retired\par Single, lives with her son Richard. She has a daughter as well who works at [employer]. She is a retired RN.\par She notes she has exercising on her bike.  Walks a lot. \par Had COVID exposure, asx COVID infection.  tested covid ab positive 4/20\par s/p COVID vaccine and COVID booster in 8/2021\par retired RN

## 2023-01-19 NOTE — ASSESSMENT
[FreeTextEntry1] : 1/2019: 77 y/o woman with history of HTN on multiple medications, kidney donor/CKD, ER-NC-HER2-, lymph-node positive right breast cancer anatomic stage IIB, AJCC 8th edition clinical prognostic stage IIIB, on neoadjuvant chemotherapy (dose-dense AC-->T) presenting for follow up. Course c/b neuropathy and a new rash after taxol, infusion reactions to both taxol and taxotere very quickly into infusion initiation. CT chest for cough 4/2019 with stable 3mm pulm nodules, decreased size of breast mass and axillary LN; US 4/17 with decrease in size of breast mass and axillary LN. She completed 4 cycles of CMF with OnPro q2 weeks. 7/11/19 b/l mastectomy with residual disease, negative margins (T1bN0(i+)Mx). S/p adjuvant radiation and started on adjuvant Xeloda (2000mg PO BID, 2 weeks on, 1 week off).  Now completed, doing well.\par \par -on active surveillance\par - discussed postmastectomy PT for tightness of chest wall - patient declines at this time\par -clinically JOSE DANIEL x 4 years\par -not following with surgeon any longer\par -follow up radiation oncology as advised\par -reviewed recent labs from 1/2023\par --FU in 6mos\par \par Right Chest wall - dry scaly, crusted lesion  - advised patient to f/up with Dr. Mujica if doesn't resolve in next 2 weeks with moisturizing lotion; history of SCC and BCC - may need derm evaluation \par \par H/o Abnormal Brain MRI - was following with Dr. Jacobs who has now left [facility]; last seen 8/2022 and MRI Brain demonstrated stability of non-specific lesion; f/up MRI recommended one year 8/2023

## 2023-01-19 NOTE — PHYSICAL EXAM
[Fully active, able to carry on all pre-disease performance without restriction] : Status 0 - Fully active, able to carry on all pre-disease performance without restriction [Normal] : affect appropriate [de-identified] : b/l mastectomies with skin outpouching laterally, no palpable masses or adenopathy,  RT related skin changes noted right chest wall with a 1.5 x 1 cm scaly area inferior to scar line;  no erythema [de-identified] : many moles over skin UE/LEs

## 2023-02-06 NOTE — ASU PATIENT PROFILE, ADULT - ALCOHOL USE HISTORY SINGLE SELECT
Internal Medicine Teaching Service (IMTS)  Discharge Summary   Patient: Ne Waters Discharge Date: 2/5/2023 [facility] Hospital: {Hospital:856675::\"[facility]\"}   YOB: 1967 Admission Date: 2/4/2023 [facility] PGY-I: To Mott MD   MRN: [id number] Admission Length: 0 Days [facility] Senior: ***   Admitting Physician: Genet Padilla MD Discharge Physician: Genet Padilla MD [facility] Team Color: {TEAM COLOR:072610}     Admission Information     Admission Diagnoses:   Abnormal EKG [R94.31]  Prolonged QT interval [R94.31]  Crack cocaine use [F14.90]  Elevated troponin [R77.8]  Aggressive behavior [R46.89]  Altered mental status, unspecified altered mental status type [R41.82]  Hypertension, unspecified type [I10] Primary Discharge Diagnoses:   *** Secondary Discharge Diagnoses:   Patient Active Problem List   Diagnosis   • Elevated troponin   • Hypertensive emergency   • T wave inversion in EKG   • Substance abuse (CMS/[facility])        LACE(0-4):Low  Total Score: 4           1 LACE Length of Stay    3 LACE Acute Admission        Criteria that do not apply:    LACE Charlson Comorbidity Index Evalution    LACE Visits to ED Previous 6 Months         Admission Condition: {Condition A:021922::\"Poor\"}    Discharged Condition: {Condition D:869399::\"Stable\"}    Disposition: {Disposition:852772::\"Home\"}     Hospital Course   Ne Waters is a 55 year old male who presented with w/ Altered Mental Status    Altered mental status attributed to cocaine toxicity and hypertensive emergency with encephalopathy due to cocaine intoxication and medication non-adherence. The patient was started on permissive -190 for the 24 hrs then PTA HTN medications restarted at half the dose. PTA meds were titrated up as tolerated and the patient became normotensive. Cardiology was consulted for elevated troponin related to demand ischemia, and new t-wave inversions on EKG, for which patient underwent Echocardiogram and  nuclear stress test (the latter performed due to limited patient participation in testing). During the patient's hospital stay, evaluation was limited by impulse control disorder, and the patient's tendency to yell or throw items at staff and absence of participation in history taking. Troponin trended down and was discontinued and the echo and stress test showed ***    Note to PCP:  -           Consultants:  IP Consult Orders (From admission, onward)     Start     Ordered    02/04/23 1232  Inpatient consult to Psychiatry  ONE TIME        Provider:  Jocelin Diaz MD    02/04/23 1233    02/04/23 1211  Inpatient consult to Cardiology  ONE TIME        Provider:  Neo Mccrary MD    02/04/23 1213                Physical Exam     Visit Vitals  /90 (BP Location: RUE - Right upper extremity, Patient Position: Semi-Zuleta's)   Pulse 87   Temp 98.7 °F (37.1 °C) (Oral)   Resp 17   Ht 5' 9\" (1.753 m)   Wt 85.6 kg (188 lb 11.4 oz)   SpO2 99%   BMI 27.87 kg/m²     Wt Readings from Last 1 Encounters:   02/05/23 85.6 kg (188 lb 11.4 oz)        Physical Exam  PHYSICAL EXAM:  General: {General Appearance:971434::\"Alert, cooperative, conversive.\"}  Skin: {SKIN BARIATRIC:090098}.  HEENT:{PHYSICAL EXAM - EYES 2:768367}.  {EXAM - NOSE TRAUMA:338162::\" The nasal sinuses are normal.\",\" The nasal septum is midline.\"}.  {PHYSICAL EXAM - EAR'S AND EAC'S:095678}.  {EXAM - Oropharynx:991055::\" The soft palate is symmetrical without lesion.\",\" The posterior pharynx is without lesion, edema or erythema.\"}.  Neck: {PHYSICAL EXAM - NECK - MEDICAL:024951::\" The trachea is midline.\",\" No cervical or supraclavicular lymphadenopathy.\"}  Respiratory: {PHYSICAL EXAM - LUNGS:170137::\" Bilaterally clear to auscultation \"}  Cardiovascular: {JTHHEART:804998}  Abdomen: {EXAM - ABDOMEN - NORMAL AND ABNORMAL:090361::\"Abdomen: soft, non-tender, non-distended.\",\"Positive bowel sounds all quadrants.\",\"No guarding or rebound.\",\"No hepatomegaly or splenomegaly.\"}    Extremities and Spine: {PHYSICAL EXAM - EXTREMITIES:485501::\" No edema.  No deformity.  No tenderness.\"}.  ***.  Back: {PHYSICAL EXAM; BACK, ED:940486::\" Normal alignment. No CVA  or midline bony tenderness.  \"}  Neuro: {PHYSICAL EXAM - NEURO:205028::\" Alert, oriented x4.\",\" Speech intact.  No dysphasia or dysarthria.\",\" Symmetrical facial structures.\",\" Strength 5/5 all extremities.  Sensation intact to light touch.\"}  Psych: {PHYSICAL EXAM - AFFECT:573632::\" Affect is \"}    Recommendations - Instructions - Follow-up   Special Recommendations (DVT hx/ INR range/ Stent hx/ Anti-coagulation/ Coagulopathies/ Bleeding Disorder hx/ etc.):    {NA:249066::\"NA\"}    Diet: One Time Diet Regular  Regular, Caffeine/decaf No Diet Activity:  {ACTIVITY:070310::\"Activity as Tolerated\",\"Activity as Tolerated w/ NO Driving for Today\",\"NO Driving or Operating Heavy Alcrestaary While on Medications\",\"NO Driving while on Analgesics\"} Wound Care: {Care:247175::\"None Needed\"}     Unresulted:  Unresulted Labs (From admission, onward)     Start     Ordered    02/06/23 0600  Basic Metabolic Panel  AM DRAW,   Routine       02/05/23 1041    02/05/23 0600  CBC with Automated Differential  AM DRAW,   Routine       02/04/23 1233    02/05/23 0600  Magnesium  AM DRAW,   Routine       02/04/23 1233              Unresulted Procedure (From admission, onward)     Start     Ordered    02/06/23 0600  NM Myocardial Perfusion Rest/Stress Mult  1 TIME IMAGING,   Routine         02/05/23 1017                Follow-up Providers/Appointments:  No follow-up provider specified.    Medications        Summary of your Discharge Medications      ASK your doctor about these medications      Details   lisinopril 40 MG tablet  Commonly known as: ZESTRIL  Ask about: Which instructions should I use?   TAKE ONE TABLET BY MOUTH EVERY DAY FOR BLOOD PRESSURE     NIFEdipine CC 60 MG 24 hr tablet  Commonly known as: ADALAT CC  Ask about: Which instructions should I use?    TAKE ONE TABLET BY MOUTH TWICE A DAY HYPERTENSION FOR BLOOD PRESSURE - AVOID GRAPEFRUIT AND ITS JUICE WITH THIS DRUG            CMS Best Practices - MI - HF - STROKE - PNA   {CMS:067191::\"NA\"}  Heart failure? {YES/NO CHF:98629653}  Stroke measures indicated? {Yes/No Stroke:13725229}  AMI? {YES/NO AMI:45518931}  Pneumonia? {YES/NO PNEUMONIA:99341120}  ____________________________  To Mott MD PGY-I TY  2/5/2023 7:29 PM  Pager: [phone]    This patient was discussed with attending physician Dr. Genet Padilla MD. Please see below or additional addendum note for any further details.     Attending Addendum:         IMTS DC Summary; © 2015. Dept. of Internal Medicine/ IMTS. Rev. 4.4; 3/27/15.   Support/ Technical Difficulties: [email or web address]     unknown

## 2023-02-14 ENCOUNTER — RX RENEWAL (OUTPATIENT)
Age: 78
End: 2023-02-14

## 2023-04-24 ENCOUNTER — APPOINTMENT (OUTPATIENT)
Dept: INTERNAL MEDICINE | Facility: CLINIC | Age: 78
End: 2023-04-24
Payer: MEDICARE

## 2023-04-24 VITALS
HEIGHT: 65 IN | WEIGHT: 192 LBS | BODY MASS INDEX: 31.99 KG/M2 | HEART RATE: 64 BPM | RESPIRATION RATE: 16 BRPM | DIASTOLIC BLOOD PRESSURE: 78 MMHG | OXYGEN SATURATION: 98 % | SYSTOLIC BLOOD PRESSURE: 128 MMHG

## 2023-04-24 LAB
25(OH)D3 SERPL-MCNC: 57.1 NG/ML
ALBUMIN SERPL ELPH-MCNC: 4.3 G/DL
ALP BLD-CCNC: 77 U/L
ALT SERPL-CCNC: 27 U/L
ANION GAP SERPL CALC-SCNC: 14 MMOL/L
AST SERPL-CCNC: 19 U/L
BASOPHILS # BLD AUTO: 0.02 K/UL
BASOPHILS NFR BLD AUTO: 0.4 %
BILIRUB SERPL-MCNC: 0.3 MG/DL
BUN SERPL-MCNC: 33 MG/DL
CALCIUM SERPL-MCNC: 9.2 MG/DL
CHLORIDE SERPL-SCNC: 105 MMOL/L
CHOLEST SERPL-MCNC: 167 MG/DL
CO2 SERPL-SCNC: 24 MMOL/L
COVID-19 SPIKE DOMAIN ANTIBODY INTERPRETATION: POSITIVE
CREAT SERPL-MCNC: 1.38 MG/DL
EGFR: 39 ML/MIN/1.73M2
EOSINOPHIL # BLD AUTO: 0.26 K/UL
EOSINOPHIL NFR BLD AUTO: 4.6 %
ESTIMATED AVERAGE GLUCOSE: 174 MG/DL
FRUCTOSAMINE SERPL-MCNC: 275 UMOL/L
GLUCOSE SERPL-MCNC: 192 MG/DL
HBA1C MFR BLD HPLC: 7.7 %
HCT VFR BLD CALC: 39.5 %
HDLC SERPL-MCNC: 60 MG/DL
HGB BLD-MCNC: 12.5 G/DL
IMM GRANULOCYTES NFR BLD AUTO: 0.2 %
LDLC SERPL CALC-MCNC: 73 MG/DL
LYMPHOCYTES # BLD AUTO: 1.23 K/UL
LYMPHOCYTES NFR BLD AUTO: 21.6 %
MAN DIFF?: NORMAL
MCHC RBC-ENTMCNC: 30.3 PG
MCHC RBC-ENTMCNC: 31.6 GM/DL
MCV RBC AUTO: 95.9 FL
MONOCYTES # BLD AUTO: 0.35 K/UL
MONOCYTES NFR BLD AUTO: 6.2 %
NEUTROPHILS # BLD AUTO: 3.82 K/UL
NEUTROPHILS NFR BLD AUTO: 67 %
NONHDLC SERPL-MCNC: 107 MG/DL
PLATELET # BLD AUTO: 223 K/UL
POTASSIUM SERPL-SCNC: 4.1 MMOL/L
PROT SERPL-MCNC: 6.3 G/DL
RBC # BLD: 4.12 M/UL
RBC # FLD: 13.5 %
SARS-COV-2 AB SERPL IA-ACNC: >250 U/ML
SODIUM SERPL-SCNC: 143 MMOL/L
TRIGL SERPL-MCNC: 170 MG/DL
WBC # FLD AUTO: 5.69 K/UL

## 2023-04-24 PROCEDURE — 36415 COLL VENOUS BLD VENIPUNCTURE: CPT

## 2023-04-24 PROCEDURE — 99214 OFFICE O/P EST MOD 30 MIN: CPT | Mod: 25

## 2023-04-24 RX ORDER — METFORMIN HYDROCHLORIDE 500 MG/1
500 TABLET, COATED ORAL TWICE DAILY
Qty: 180 | Refills: 1 | Status: DISCONTINUED | COMMUNITY
Start: 2019-06-24 | End: 2023-04-24

## 2023-04-24 NOTE — HISTORY OF PRESENT ILLNESS
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 \par \par Had tested covid ab positive 4/20 - reports that she has had recurrent sx every other mo.  Mild, better and resolved with otc rx x1.  \par Had been planning on having repeat reconstructive surgery.  \par Has been followed with cardiology. Has been maintained on current rx. \par following with oncology closely - Note since last time reviewed.\par following with derm - \par s/p port removal\par Following with neurology - \par \par s/p mechanical fall causes by her dog with injuries in the past\par diarrhea - GI sx better with taking once a day.  feeling even better when she dose not take.  Still with occ Gi sx.  Has been trying to take the med bid.  Was changed back to metformin IR by the pharmacy.  \par \par Some headache after getting the covid booster.  she will be going for the second booster.  Reports that she had reaction to the second booster.  \par Has not been checking FS glucose.  \par BP has been controlled.\par appetite has been about the same - fair. Has been following better diet.  With issues from the ? adverse affects of the med.\par Walking and going on the bike for exercise.  Has been daily.  Was not using after getting the port out - has been walking daily.  Has been feeling strong.  \par with some muscle aches.\par Repots some ? numbness at the feet.  \par Ear and nose have been better.  taste has been better   \par \par mammo/sono - Br Ca as noted.\par pap overdue.  Has not been going - s/p hysterectomy\par \par fit 7/21 - Wants to do in July\par Had flu vaccine\par had prevnar\par Pneumovax - [facility] - ~ 2018\par Had covid vaccine x2 - had boosters\par Reprots zoster vaccine ` 2014

## 2023-04-24 NOTE — HEALTH RISK ASSESSMENT
[Yes] : Yes [Monthly or less (1 pt)] : Monthly or less (1 point) [1 or 2 (0 pts)] : 1 or 2 (0 points) [Never (0 pts)] : Never (0 points) [No] : In the past 12 months have you used drugs other than those required for medical reasons? No [0] : 2) Feeling down, depressed, or hopeless: Not at all (0) [PHQ-2 Negative - No further assessment needed] : PHQ-2 Negative - No further assessment needed [Never] : Never [Audit-CScore] : 1 [[id number]] : 0

## 2023-04-24 NOTE — REVIEW OF SYSTEMS
[Diarrhea] : diarrhea [Negative] : Heme/Lymph [Fever] : no fever [Chills] : no chills [Fatigue] : no fatigue [Hot Flashes] : no hot flashes [Night Sweats] : no night sweats [Recent Change In Weight] : ~T no recent weight change [FreeTextEntry7] : altered taste from the chemo - 90% better [FreeTextEntry2] : sweats/diaphoresis with the hypoglycemic episodes [FreeTextEntry9] : aches improved as noted.

## 2023-04-26 ENCOUNTER — APPOINTMENT (OUTPATIENT)
Dept: DERMATOLOGY | Facility: CLINIC | Age: 78
End: 2023-04-26
Payer: MEDICARE

## 2023-04-26 PROCEDURE — 99213 OFFICE O/P EST LOW 20 MIN: CPT

## 2023-05-22 ENCOUNTER — RX RENEWAL (OUTPATIENT)
Age: 78
End: 2023-05-22

## 2023-06-21 ENCOUNTER — APPOINTMENT (OUTPATIENT)
Dept: CARDIOLOGY | Facility: CLINIC | Age: 78
End: 2023-06-21
Payer: MEDICARE

## 2023-06-21 VITALS
HEIGHT: 65 IN | BODY MASS INDEX: 31.99 KG/M2 | HEART RATE: 76 BPM | OXYGEN SATURATION: 96 % | SYSTOLIC BLOOD PRESSURE: 111 MMHG | WEIGHT: 192 LBS | DIASTOLIC BLOOD PRESSURE: 78 MMHG

## 2023-06-21 PROCEDURE — 93000 ELECTROCARDIOGRAM COMPLETE: CPT

## 2023-06-21 PROCEDURE — 99214 OFFICE O/P EST MOD 30 MIN: CPT | Mod: 25

## 2023-06-21 RX ORDER — CHROMIUM 200 MCG
TABLET ORAL
Refills: 0 | Status: ACTIVE | COMMUNITY

## 2023-06-21 NOTE — PHYSICAL EXAM
[Well Developed] : well developed [No Acute Distress] : no acute distress [Well Nourished] : well nourished [Normal Voice Quality] : was normal [Normal Verbal Skills] : the patient had normal verbal communication skills [Normal Conjunctiva] : normal conjunctiva [Normal Venous Pressure] : normal venous pressure [No Carotid Bruit] : no carotid bruit [Normal S1, S2] : normal S1, S2 [No Murmur] : no murmur [No Rub] : no rub [No Gallop] : no gallop [Normal Rate] : normal [Rhythm Regular] : regular [Normal S1] : normal S1 [Normal S2] : normal S2 [Clear Lung Fields] : clear lung fields [Good Air Entry] : good air entry [No Respiratory Distress] : no respiratory distress  [Normal] : soft, non-tender, no masses/organomegaly, normal bowel sounds [Soft] : abdomen soft [Non Tender] : non-tender [No Masses/organomegaly] : no masses/organomegaly [Normal Bowel Sounds] : normal bowel sounds [Normal Gait] : normal gait [No Edema] : no edema [No Cyanosis] : no cyanosis [No Clubbing] : no clubbing [No Varicosities] : no varicosities [No Rash] : no rash [No Skin Lesions] : no skin lesions [Moves all extremities] : moves all extremities [No Focal Deficits] : no focal deficits [Normal Speech] : normal speech [Alert and Oriented] : alert and oriented [Normal memory] : normal memory

## 2023-07-04 ENCOUNTER — NON-APPOINTMENT (OUTPATIENT)
Age: 78
End: 2023-07-04

## 2023-07-05 NOTE — HISTORY OF PRESENT ILLNESS
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 \par radiation treatment for 25 cycles to be completed feb 22. 2020\par Xeloda twice daily for two weeks and one week off treatment in rotation.\par S/P bilateral mastectomy without reconstruction.   \par \par Interval Note February 19, 2020.\par on capecitabine\par scheduled for a MOHS procedure with Dr.Jessica Johnson  to excise an atypical intradermal melanocytic proliferation on her right forearm. \par \par Repeat echo was performed today.\par \par Interval follow up 9/2/2020\par onc notes reviewed in October 2020\par s/p asx covid +\par s/p treatment, RT\par awaiting plastics reconstruction \par \par Interval \par exercising stationary bike 45 mins a day\par ROS: Denies Chest pain, dyspnea, palpitations, dizziness or syncope.\par \par Interval Note March 24, 2021\par \par Patient returns for a routine cardiac follow up. 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 BMI  31\par \par S/P ACT; Completed all neoadjuvant chemotherapy in July 2019. \par Radiation treatment for 25 cycles to be completed Feb 22. 2020\par Xeloda twice daily for two weeks and one week off treatment in rotation.\par S/P bilateral mastectomy without reconstruction.   \par \par Patient discontinued treatment last March 2020 per oncology during the start of the COVID pandemic.\par Of note, patient had COVID virus in April of 2020 with mild symptoms.\par \par She has received the first vaccination of Moderna last week.\par Blood pressure today is elevated. She reports that she just took her morning medication. \par \par Echocardiogram was performed this morning: \par Normal LV function\par Reduced global strain when compared to previous study\par \par Interval follow-up July 28, 2021\par Pending port removal this week\par EKG July 28, 2021 normal sinus rhythm\par Blood pressure controlled on current medication\par ROS: Denies Chest pain, dyspnea, palpitations, dizziness or syncope.\par \par Interval Note\par April 6, 2022\par \par Ms Moran is now 76 years old with the following history:\par -S/P  breast cancer:  [place] -  [place] -   Her 2/  Adalgisa- right  breast cancer.\par -S/P radiation therapy\par -S/P medi port removal  -July, 2021\par -hypertension controlled \par -Hyperlipidemia controlled\par -diabetes\par \par walking 4 miles /day\par ROS: Denies Chest pain, dyspnea, palpitations, dizziness or syncope.\par \par Interval Note October 11, 2022\par \par 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, retired RN with the following history:\par -S/P  breast cancer:  [place] -  [place] -   Her 2/  Adalgisa- right  breast cancer.: moderately differentiated ductal CA\par -Hx of bilateral mastectomy-> July 11, 2019-> no reconstruction\par -Hx of Chemotherapy\par -Hx of  radiation therapy\par -S/P medi port removal  -July, 2021\par -Hx of hypertension  \par -Hx of Hyperlipidemia \par -Hx of diabetes\par -Hx of kidney donor\par -Hx of Obesity\par -Hx of COVID- 19 infection in May of 2022\par \par Repeat echocardiogram performed today, October 12, 2022\par Results:\par Normal mitral valve\par Mild-moderate MR\par Normal L internal dimensions and wall thicknesses\par Normal LV systolic function\par No segmental wall motion abnormalities\par Global longitudinal strain -19% (normal)\par Normal diastolic function\par Normal RV size and function \par \par ***Compared with echo from 3/24/21, GLS has improved (prior -15.4%)\par \par Blood pressure today: 134/ 84\par EKG- Sinus rhythm @ 74 bpm\par \par Patient reports feeling well and offers no complaints\par \par Interval follow up 6/21/23\par \par Patient is here for follow-up today\par Complains of symptoms after COVID- vaccine\par ROS: Denies Chest pain, dyspnea, palpitations, dizziness or syncope.\par \par \par \par \par \par

## 2023-07-05 NOTE — ASSESSMENT
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, retired RN with the following history:\par -S/P  breast cancer:  ER -  [place] -   Her 2/  Adalgisa- right  breast cancer.: moderately differentiated ductal CA\par -Hx of bilateral mastectomy-> July 11, 2019-> no reconstruction\par -Hx of Chemotherapy\par -Hx of  radiation therapy\par -S/P medi port removal  -July, 2021\par -Hx of hypertension  \par -Hx of Hyperlipidemia \par -Hx of diabetes\par -Hx of kidney donor\par -Hx of Obesity\par -Hx of COVID- 19 infection in May of 2022\par \par \par #Hypertension\par   Blood pressure under acceptable control\par   Continue with Amlodipine 2.5 mg QD\par   Continue with Valsartan- HCTZ  320- 25 mg QD\par \par #S/P chemotherapy/radiation\par Echo stable\par   Continue with Carvedilol 25 mg BID\par \par #Hyperlipidemia\par   Continue on Rosuvastatin 5 mg QHS\par \par #Diabetes\par Follow-up with endocrine\par \par Follow up in 6 months.\par \par \par \par \par \par \par \par \par \par \par

## 2023-07-05 NOTE — CARDIOLOGY SUMMARY
[___] : [unfilled] [LVEF ___%] : LVEF [unfilled]% [de-identified] : June 21, 2023 sinus rhythm [de-identified] : 10/12/22\par Normal mitral valve\par Mild-moderate MR\par Normal L internal dimensions and wall thicknesses\par Normal LV systolic function\par No segmental wall motion abnormalities\par Global longitudinal strain -19% (normal)\par Normal diastolic function\par Normal RV size and function \par \par ***Compared with echo from 3/24/21, GLS has improved (prior -15.4%)\par

## 2023-07-10 ENCOUNTER — RX RENEWAL (OUTPATIENT)
Age: 78
End: 2023-07-10

## 2023-07-10 RX ORDER — KETOCONAZOLE 20 MG/G
2 CREAM TOPICAL TWICE DAILY
Qty: 60 | Refills: 3 | Status: ACTIVE | COMMUNITY
Start: 2019-06-26 | End: 1900-01-01

## 2023-07-13 ENCOUNTER — OUTPATIENT (OUTPATIENT)
Dept: OUTPATIENT SERVICES | Facility: HOSPITAL | Age: 78
LOS: 1 days | Discharge: HOME | End: 2023-07-13

## 2023-07-13 DIAGNOSIS — Z90.711 ACQUIRED ABSENCE OF UTERUS WITH REMAINING CERVICAL STUMP: Chronic | ICD-10-CM

## 2023-07-13 DIAGNOSIS — Z98.891 HISTORY OF UTERINE SCAR FROM PREVIOUS SURGERY: Chronic | ICD-10-CM

## 2023-07-13 DIAGNOSIS — C50.919 MALIGNANT NEOPLASM OF UNSPECIFIED SITE OF UNSPECIFIED FEMALE BREAST: ICD-10-CM

## 2023-07-13 DIAGNOSIS — Z92.21 PERSONAL HISTORY OF ANTINEOPLASTIC CHEMOTHERAPY: Chronic | ICD-10-CM

## 2023-07-13 DIAGNOSIS — Z90.5 ACQUIRED ABSENCE OF KIDNEY: Chronic | ICD-10-CM

## 2023-07-20 ENCOUNTER — APPOINTMENT (OUTPATIENT)
Dept: HEMATOLOGY ONCOLOGY | Facility: CLINIC | Age: 78
End: 2023-07-20
Payer: MEDICARE

## 2023-07-20 VITALS
SYSTOLIC BLOOD PRESSURE: 116 MMHG | HEART RATE: 75 BPM | OXYGEN SATURATION: 97 % | RESPIRATION RATE: 16 BRPM | BODY MASS INDEX: 31.95 KG/M2 | TEMPERATURE: 97 F | WEIGHT: 192.02 LBS | DIASTOLIC BLOOD PRESSURE: 75 MMHG

## 2023-07-20 PROCEDURE — 99214 OFFICE O/P EST MOD 30 MIN: CPT

## 2023-07-20 NOTE — PHYSICAL EXAM
[Fully active, able to carry on all pre-disease performance without restriction] : Status 0 - Fully active, able to carry on all pre-disease performance without restriction [Normal] : affect appropriate [de-identified] : b/l mastectomies with skin outpouching laterally, no palpable masses or adenopathy,  RT related skin changes noted right chest wall no erythema [de-identified] : many moles over skin UE/LEs

## 2023-07-20 NOTE — REVIEW OF SYSTEMS
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

## 2023-07-20 NOTE — ASSESSMENT
[FreeTextEntry1] :  76 y/o woman with history of HTN on multiple medications, kidney donor/CKD, ER-NM-HER2-, lymph-node positive right breast cancer anatomic stage IIB, AJCC 8th edition clinical prognostic stage IIIB, diagnosed in 2019. Initially was on neoadjuvant chemotherapy (dose-dense AC-->T) with course c/b neuropathy and a new rash after taxol, infusion reactions to both taxol and taxotere very quickly into infusion initiation. CT chest for cough 4/2019 with stable 3mm pulm nodules, decreased size of breast mass and axillary LN; US 4/17 with decrease in size of breast mass and axillary LN. She completed 4 cycles of CMF with OnPro q2 weeks and on  7/11/19 b/l mastectomy with residual disease, negative margins (T1bN0(i+)Mx). S/p adjuvant radiation and started on adjuvant Xeloda (2000mg PO BID, 2 weeks on, 1 week off).  Now completed, doing well.\par \par -on active surveillance\par - discussed postmastectomy PT for tightness of chest wall - patient declines at this time; declines additional evaluation by plastics\par -clinically JOSE DANIEL x 4.5 years\par -not following with breast surgeon any longer\par -follow up radiation oncology as advised\par -reviewed recent labs from 4/2023; elevated glucose and mild renal insufficiency noted\par - continued surveillance brain imaging in 8/2023 and follow-up with neuro-onc in 9/2023\par - continued follow up with dermatology every 6 months\par - Patient had the opportunity to have all their questions answered to their satisfaction \par - f/u in 6 months; thereafter annually

## 2023-07-20 NOTE — HISTORY OF PRESENT ILLNESS
[Disease: _____________________] : Disease: [unfilled] [T: ___] : T[unfilled] [N: ___] : N[unfilled] [AJCC Stage: ____] : AJCC Stage: [unfilled] [de-identified] : Patient was seen by her PMD who sent her for a screening mammogram (Last mammogram )  after she palpated a mass on the R side of her chest 2018. She had been having body aches/soreness and feeling general malaise since November, despite a course of antibiotics (just after flu shot administered). Also some R shoulder/arm pains. She has no prior history of abnormal breast imaging, breast biopsies, or surgeries.\par \par 18 Mammogram screenin.7 cm irregular spiculated mass upper outer right breast 8 cm FN with associated architectural distortion and several associated microcalcifications. Partially included enlarged right axillary lymph nodes. BIRADS 0\par \par 18 bilateral breast US: R breast 10:00 7 cm FN 2.6 cm irregular hypoechoic mass (biopsy recommended). Inferior right axillary lymph node with focal area of eccentric cortical thickening (recommend US guided biopsy). 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 [place] 7/. 1.4cm involved, DCIS cribiform pattern with high grade nuclear atypia and apocrine cytology very focally present. (coil shaped clip) R axillary lymph node: metastatic ductal carcinoma with apocrine cytology involving a lymph node (hydromark marking clip) ER 0% NC 0% HER2 IHC 0 negative\par \par She saw Dr. Demetrio Robertson who recommended medical oncology consultation for neoadjuvant chemotherapy. \par \par She noted on initial visit with me some occasional body aches still and fatigue over the last month. She notes an intermittent tongue sensation - like a scald from food (though she does not recall an episode of this). Symptoms intermittent for months. Otherwise she feels well. She is active at baseline, uses stationary bike at home most days for exercise. \par \par 19 PET/CT: Superolateral R breast and R axillary lymph nodes FDG-avid. S/p left nephrectomy. Few subcm b/l pulmonary nodules are nonspecific. Please correlate clinically for infection and follow up with CT chest in 1 month.  MRI Brain w/wo contrast 19: L maxillary polyp v. retention cyst. Non enhancing area of abnormal T2 prolongation involving left thalamic region, nonspecific. Stable on repeat 2019. Following with Dr. Jacobs.  CT Chest 19: No interval changes since 19. Stable 3.3 x 2.1 cm UOQ R breast, 2 x 1.2 cm R axillary lymph node. Stable 4 mm and smaller scattered indeterminant pulmonary nodules.\par \par She started neoadjuvant chemotherapy with AC on 19. Taxol #1 3/27.\par Taxol #2 with grade 3 infusion reaction, vasculitic rash development. Taxol reattempt with similar. Taxotere attempt  with chest discomfort, acute worsening of rash/itching, severe back pain radiating into the chest, a few minutes into infusion. \par Breast US performed with response to therapy noted.\par Also c/b steroid induced hyperglycemia (A1C 8.0) - initiated home glucose monitoring and PMD follow up.\par \par CMF #1 with neulasta onpro started 19 without event; 4 cycles completed.\par \par b/l mastectomy 19 (Dr. Robertson): R breast: IDC, mod diff, 5.1mm (residual disease ranging in size from 1-2mm in tumor bed, approx 10-20% viable cells in 1.9x1.5x1.0cm tumor bed post-neoadjuvant treatment), DCIS grade 2-3 (4mm, 5/14 blocks), 2/4 sentinel nodes with ITCs on permanent section only. Margins negative\par [id number](i+)\par ER-NC-HER2 IHC 0 negative, PDL <1%.\par \par She completed post-mastectomy RT to the right braest c/b radiation dermatitis.\par \par She started adjuvant xeloda for residual disease (2000mg PO BID 2 weeks on, 1 week off) in Oct 2019. C1 xeloda with skin cracking, q2 diarrhea, decreased dose, completed nearly 7 cycles, held in light of COVID19 pandemic with high risk given close + contacts and per patient's request. Xeloda completed 2020. \par \par No family history of breast or ovarian cancer. [de-identified] : ER-NH-HER2- [de-identified] : 7/20/23\par transferring care from Dr. Thacker to me today\par All of the patient's prior records including radiology, pathology and prior notes reviewed; Past Medical History, Past Surgical History, Family History and Social history reviewed and updated in the patient's chart.\par \par Patient returns today to rule out progression or recurrence of breast cancer; On active surveillance\par Denies any chest wall masses or skin change - patient is very unhappy with results of her mastectomy surgery - had considered plastic surgery, but no longer interested.\par Patient denies any SOB, CP, abdominal pain, bone pain, headache, or unexplained weight loss\par Notes chest wall tightness since surgery, not had PT; not causing difficulty with ADLS\par \par Has significant diarrhea and GI effects related to metformin; working with pcp \par \par Not following with surgeon Dr. Robertson any longer\par Recent labs reviewed 1/2023\par \par HEALTH MAINTENANCE\par PMD Lance Lafleur, PMD up to date, last seen 4/2023, mgt of DM, sees q3mos.  Good energy level. \par CARDS Dr. Lorrie Yadav; Echo has been normal; following every 6 months\par DEXA - She never had one. Not interested in it, discussed previously \par GI Colonoscopy - Had one many years prior but does not remember when it was done and how it was, reportedly no polyps. Stool occult blood screening 6/2021 with PMD negative, advised follow up with Dr. Lafleur; patient also notes she had cologard in 2021; will be due again in 2024\par BCC of skin removed, Dr. 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 Hale.\par GYN Pap smear - s/p CASSIE for fibroid uterus in 1985, declines further follow up\par RENAL Single kidney (kidney donor to brother in 1982) - baseline Cr 1.2-1.3 though patient notes no prior renal issues\par LIVER Transaminitis AST 43, ALT 54 (12/28/18), no prior history, resolved since beginning treatment\par NEURO - Dr Jacobs now transitioned to Dr. Gustafson ; MR Head, 8/3/20- Re-demonstration 1.7 x 2.9 x 1.7 cm AP, TR, CC left thalamic pulvinar enlargement with T2, FLAIR hyperintensity, no rCBV or rCBF increase, differential includes low-grade neoplasm or atypical hamartoma, without interval change from prior.  Unchanged mild volume loss, microvascular disease, no new restricted diffusion, hemorrhage or midline shift. Last follow up with Dr. Jacobs 8/23/21, rec FU in 1 year with MRI brain. MRI brain 8/22 stable. - f/up scheduled with Dr. Nagi Gustafson in 9/2023\par Single, lives with her son Richard. She has a daughter as well who works at [employer]. She is a retired RN.\par She notes she has exercising on her bike.  Walks a lot; feels she had a lot of muscle aches and worsening neuropathy of the feet after COVID vaccinations; using Vicks topically with improvement\par Had COVID exposure, asx COVID infection.  tested covid ab positive 4/20\par s/p COVID vaccine and COVID booster in 8/2021\par retired RN

## 2023-07-20 NOTE — REASON FOR VISIT
[Follow-Up Visit] : a follow-up [FreeTextEntry2] : Right Breast Cancer - transferring care from Dr. Thacker to me

## 2023-08-14 ENCOUNTER — APPOINTMENT (OUTPATIENT)
Dept: INTERNAL MEDICINE | Facility: CLINIC | Age: 78
End: 2023-08-14
Payer: MEDICARE

## 2023-08-14 VITALS
RESPIRATION RATE: 16 BRPM | OXYGEN SATURATION: 97 % | HEART RATE: 68 BPM | HEIGHT: 65 IN | SYSTOLIC BLOOD PRESSURE: 124 MMHG | WEIGHT: 192 LBS | DIASTOLIC BLOOD PRESSURE: 78 MMHG | BODY MASS INDEX: 31.99 KG/M2

## 2023-08-14 PROCEDURE — 99214 OFFICE O/P EST MOD 30 MIN: CPT | Mod: 25

## 2023-08-14 PROCEDURE — 36415 COLL VENOUS BLD VENIPUNCTURE: CPT

## 2023-08-14 NOTE — HISTORY OF PRESENT ILLNESS
[FreeTextEntry1] : Br Ca, HTN, HLD, DM, el CR [de-identified] : 76 y/o female with a hx of HTN , DM, kidney donor, Breast Ca - Invasive moderately differentiated ductal carcinoma with apocrine cytology and desmoplastic stroma s/p b/l mastectomy 7/11/19 and chemo and xrt.   Had tested covid ab positive 4/20 - reports that she has had recurrent sx every other mo.  Mild, better and resolved with otc rx x1.   Had been planning on having repeat reconstructive surgery.   Has been followed with cardiology. Has been maintained on current rx.  following with oncology closely - Note since last time reviewed. following with derm -  s/p port removal Following with neurology -  REcent f/u notes from derm, cardiology and onc reviewed.  s/p mechanical fall causes by her dog with injuries in the past diarrhea - GI sx better with taking once a day.  feeling even better when she dose not take.  Still with occ Gi sx.  Has been trying to take the med bid.  Was changed back to metformin IR by the pharmacy.  Has been better with taking the rx 1 or 2 times a day based on how she feels.  Some headache after getting the covid booster.  she will be going for the second booster.  Reports that she had reaction to the second booster.   Has not been checking FS glucose.   BP has been controlled. appetite has been about the same - fair. Has been following better diet.  With issues from the ? adverse affects of the med. Walking and going on the bike for exercise.  Has been daily.  Was not using after getting the port out - has been walking daily.  Has been feeling strong.   with some muscle aches.  some improvemnt with rubbing on vicks Repots some ? numbness at the feet.   Ear and nose have been better.  taste has been better    Ophtho - ? July  mammo/sono - Br Ca as noted. pap overdue.  Has not been going - s/p hysterectomy  fit 7/21 - Wants to do in July Had flu vaccine had prevnar Pneumovax - [facility] - ~ 2018 Had covid vaccine x2 - had boosters Reprots zoster vaccine ` 2014

## 2023-08-14 NOTE — PHYSICAL EXAM
[Normal] : affect was normal and insight and judgment were intact [Normal Posterior Cervical Nodes] : no posterior cervical lymphadenopathy [Normal Anterior Cervical Nodes] : no anterior cervical lymphadenopathy [de-identified] : obese

## 2023-08-14 NOTE — REVIEW OF SYSTEMS
[Diarrhea] : diarrhea [Negative] : Heme/Lymph [Fever] : no fever [Chills] : no chills [Fatigue] : no fatigue [Hot Flashes] : no hot flashes [Night Sweats] : no night sweats [Recent Change In Weight] : ~T no recent weight change [FreeTextEntry2] : sweats/diaphoresis with the hypoglycemic episodes [FreeTextEntry7] : altered taste from the chemo - 90% better [FreeTextEntry9] : aches improved as noted.

## 2023-08-14 NOTE — HEALTH RISK ASSESSMENT
[Yes] : Yes [Monthly or less (1 pt)] : Monthly or less (1 point) [1 or 2 (0 pts)] : 1 or 2 (0 points) [Never (0 pts)] : Never (0 points) [No] : In the past 12 months have you used drugs other than those required for medical reasons? No [0] : 2) Feeling down, depressed, or hopeless: Not at all (0) [PHQ-2 Negative - No further assessment needed] : PHQ-2 Negative - No further assessment needed [Never] : Never [No Retinopathy] : No retinopathy [EyeExamDate] : 7/23 [Audit-CScore] : 1 [[id number]] : 0

## 2023-09-01 ENCOUNTER — APPOINTMENT (OUTPATIENT)
Dept: MRI IMAGING | Facility: IMAGING CENTER | Age: 78
End: 2023-09-01
Payer: MEDICARE

## 2023-09-01 ENCOUNTER — APPOINTMENT (OUTPATIENT)
Dept: NEUROLOGY | Facility: CLINIC | Age: 78
End: 2023-09-01
Payer: MEDICARE

## 2023-09-01 ENCOUNTER — OUTPATIENT (OUTPATIENT)
Dept: OUTPATIENT SERVICES | Facility: HOSPITAL | Age: 78
LOS: 1 days | End: 2023-09-01
Payer: MEDICARE

## 2023-09-01 VITALS
OXYGEN SATURATION: 99 % | TEMPERATURE: 98.5 F | HEART RATE: 77 BPM | SYSTOLIC BLOOD PRESSURE: 146 MMHG | DIASTOLIC BLOOD PRESSURE: 85 MMHG | RESPIRATION RATE: 16 BRPM

## 2023-09-01 DIAGNOSIS — Z00.8 ENCOUNTER FOR OTHER GENERAL EXAMINATION: ICD-10-CM

## 2023-09-01 DIAGNOSIS — Z90.5 ACQUIRED ABSENCE OF KIDNEY: Chronic | ICD-10-CM

## 2023-09-01 DIAGNOSIS — R90.89 OTHER ABNORMAL FINDINGS ON DIAGNOSTIC IMAGING OF CENTRAL NERVOUS SYSTEM: ICD-10-CM

## 2023-09-01 DIAGNOSIS — Z92.21 PERSONAL HISTORY OF ANTINEOPLASTIC CHEMOTHERAPY: Chronic | ICD-10-CM

## 2023-09-01 DIAGNOSIS — Z90.711 ACQUIRED ABSENCE OF UTERUS WITH REMAINING CERVICAL STUMP: Chronic | ICD-10-CM

## 2023-09-01 DIAGNOSIS — Z98.891 HISTORY OF UTERINE SCAR FROM PREVIOUS SURGERY: Chronic | ICD-10-CM

## 2023-09-01 PROCEDURE — A9585: CPT

## 2023-09-01 PROCEDURE — 99215 OFFICE O/P EST HI 40 MIN: CPT

## 2023-09-01 PROCEDURE — 70553 MRI BRAIN STEM W/O & W/DYE: CPT | Mod: 26

## 2023-09-01 PROCEDURE — 70553 MRI BRAIN STEM W/O & W/DYE: CPT

## 2023-09-01 NOTE — HISTORY OF PRESENT ILLNESS
[FreeTextEntry1] : NEURO-ONCOLOGY  This 77 year old right handed woman is seen in follow up for evaluation and management of abnormal brain MRI  She has TN breast cancer.  She was treated with nACT (poorly tolerated), then CMF, followed by BM and RT.  She received 1 year of adjuvant xeloda, done in 2020. She now follows with Dr. Gregorio. For nonspecific gustatory sensations and dysgeusia she had an MRI brain showing a left thalamic lesion, stable over time.   Trial of seizure meds did not abort events, but they eventually subsided spontaneously.    INTERVAL HISTORY:  No new neurologic complaints.   MRI brain stable without enhancement in thalamic lesion.   PMH: DM.  HTN.  breast cancer PSH:  BM.  CASSIE.  donor nephrectomy SHX:  retired RN. nonsmoker. nondrinker FHX:  denies brain tumors

## 2023-09-01 NOTE — PHYSICAL EXAM
[FreeTextEntry1] :  Exam as below (with documented exceptions in parentheses):  General:  Healthy appearing woman well groomed and without deformities. KPS is 90  Mental Status:  Awake, alert and attentive. Oriented to person, place and time. Recent and remote memory intact. Normal concentration. Fluent spontaneous speech with intact naming and repetition. Normal fund of knowledge.    Cranial Nerves: II: Full visual fields. III,IV,VI:Pupils round, reactive to light. Full extraocular movements. No nystagmus. V: Normal bilateral bite, facial sensation. VII: No facial weakness. VIII: Hearing intact. IX,X: Palate midline, intact gag. XI:  Sternocleidomastoids normal. XII: Tongue protrudes midline. No dysarthria.   Motor:  Normal tone, bulk and power throughout including arms and legs, proximal and distal. No pronator drift. No abnormal movements.   Sensation: Normal in arms, legs and trunk to pin, proprioception and vibration. Negative Romberg.   Coordination: No dysmetria or dysdiadochokinesis bilaterally. Normal heel-shin testing.   Gait: Normal including heel and toe walking. Normal station.   Reflexes: Normoactive and symmetric throughout. Absent Babinski bilaterally.  (diminished AJs)  Vascular: No peripheral edema/calf tenderness.   Eyes: Funduscopic exam without papilledema (dferred)  Heart: Regular rate and rhythm. Normal s1-s2. No murmurs, rubs or gallops.   Respiratory: Lungs clear to auscultation bilaterally.   Abdomen: Nontender, non-distended. No hepatosplenomegaly. Normal bowel sounds in four quadrants.

## 2023-09-01 NOTE — DISCUSSION/SUMMARY
[FreeTextEntry1] : Abnormal brain MRI in patient with history of TN breast cancer. Scan more c/w structural congenital abnl or LGG.  It is stable over time.  We discussed continued f/u and symptoms that would prompt imaging moving up.   New MRI brain for 1 year coordinated RTC 1 year

## 2023-09-01 NOTE — DATA REVIEWED
[de-identified] : reviewed serial MRI brain 9/23, 8/22 and prior from 2021 and 1/2019, without change, enhancement or hyperperfusion of left thalamic lesion

## 2023-10-16 ENCOUNTER — RX CHANGE (OUTPATIENT)
Age: 78
End: 2023-10-16

## 2023-11-08 ENCOUNTER — APPOINTMENT (OUTPATIENT)
Dept: DERMATOLOGY | Facility: CLINIC | Age: 78
End: 2023-11-08
Payer: MEDICARE

## 2023-11-08 DIAGNOSIS — L82.1 OTHER SEBORRHEIC KERATOSIS: ICD-10-CM

## 2023-11-08 PROCEDURE — 17000 DESTRUCT PREMALG LESION: CPT

## 2023-11-08 PROCEDURE — 99213 OFFICE O/P EST LOW 20 MIN: CPT | Mod: 25

## 2023-11-08 PROCEDURE — 17003 DESTRUCT PREMALG LES 2-14: CPT | Mod: 59

## 2023-11-17 ENCOUNTER — APPOINTMENT (OUTPATIENT)
Dept: INTERNAL MEDICINE | Facility: CLINIC | Age: 78
End: 2023-11-17
Payer: MEDICARE

## 2023-11-17 VITALS
HEIGHT: 65 IN | DIASTOLIC BLOOD PRESSURE: 62 MMHG | BODY MASS INDEX: 32.49 KG/M2 | HEART RATE: 82 BPM | SYSTOLIC BLOOD PRESSURE: 108 MMHG | OXYGEN SATURATION: 98 % | WEIGHT: 195 LBS | RESPIRATION RATE: 16 BRPM

## 2023-11-17 DIAGNOSIS — Z23 ENCOUNTER FOR IMMUNIZATION: ICD-10-CM

## 2023-11-17 LAB
25(OH)D3 SERPL-MCNC: 55.2 NG/ML
ALBUMIN SERPL ELPH-MCNC: 4.4 G/DL
ALBUMIN SERPL ELPH-MCNC: 4.4 G/DL
ALP BLD-CCNC: 59 U/L
ALP BLD-CCNC: 60 U/L
ALT SERPL-CCNC: 13 U/L
ALT SERPL-CCNC: 17 U/L
ANION GAP SERPL CALC-SCNC: 13 MMOL/L
ANION GAP SERPL CALC-SCNC: 14 MMOL/L
APPEARANCE: CLEAR
AST SERPL-CCNC: 15 U/L
AST SERPL-CCNC: 15 U/L
BACTERIA: NEGATIVE /HPF
BASOPHILS # BLD AUTO: 0.02 K/UL
BASOPHILS NFR BLD AUTO: 0.3 %
BILIRUB SERPL-MCNC: 0.3 MG/DL
BILIRUB SERPL-MCNC: 0.3 MG/DL
BILIRUBIN URINE: NEGATIVE
BLOOD URINE: NEGATIVE
BUN SERPL-MCNC: 31 MG/DL
BUN SERPL-MCNC: 33 MG/DL
CALCIUM SERPL-MCNC: 10.3 MG/DL
CALCIUM SERPL-MCNC: 9.8 MG/DL
CAST: 0 /LPF
CHLORIDE SERPL-SCNC: 100 MMOL/L
CHLORIDE SERPL-SCNC: 102 MMOL/L
CHOLEST SERPL-MCNC: 164 MG/DL
CHOLEST SERPL-MCNC: 186 MG/DL
CK SERPL-CCNC: 165 U/L
CO2 SERPL-SCNC: 25 MMOL/L
CO2 SERPL-SCNC: 27 MMOL/L
COLOR: YELLOW
CREAT SERPL-MCNC: 1.27 MG/DL
CREAT SERPL-MCNC: 1.3 MG/DL
CREAT SPEC-SCNC: 58 MG/DL
EGFR: 42 ML/MIN/1.73M2
EGFR: 44 ML/MIN/1.73M2
EOSINOPHIL # BLD AUTO: 0.24 K/UL
EOSINOPHIL NFR BLD AUTO: 4.1 %
EPITHELIAL CELLS: 13 /HPF
ESTIMATED AVERAGE GLUCOSE: 180 MG/DL
ESTIMATED AVERAGE GLUCOSE: 180 MG/DL
FRUCTOSAMINE SERPL-MCNC: 284 UMOL/L
GLUCOSE QUALITATIVE U: NEGATIVE MG/DL
GLUCOSE SERPL-MCNC: 153 MG/DL
GLUCOSE SERPL-MCNC: 170 MG/DL
HBA1C MFR BLD HPLC: 7.9 %
HBA1C MFR BLD HPLC: 7.9 %
HCT VFR BLD CALC: 38.2 %
HDLC SERPL-MCNC: 59 MG/DL
HDLC SERPL-MCNC: 66 MG/DL
HEMOCCULT STL QL IA: NEGATIVE
HGB BLD-MCNC: 12.2 G/DL
IMM GRANULOCYTES NFR BLD AUTO: 0.2 %
KETONES URINE: NEGATIVE MG/DL
LDLC SERPL CALC-MCNC: 79 MG/DL
LDLC SERPL CALC-MCNC: 93 MG/DL
LEUKOCYTE ESTERASE URINE: ABNORMAL
LYMPHOCYTES # BLD AUTO: 1.42 K/UL
LYMPHOCYTES NFR BLD AUTO: 24 %
MAGNESIUM SERPL-MCNC: 1.7 MG/DL
MAN DIFF?: NORMAL
MCHC RBC-ENTMCNC: 30.2 PG
MCHC RBC-ENTMCNC: 31.9 GM/DL
MCV RBC AUTO: 94.6 FL
MICROALBUMIN 24H UR DL<=1MG/L-MCNC: <1.2 MG/DL
MICROALBUMIN/CREAT 24H UR-RTO: NORMAL MG/G
MICROSCOPIC-UA: NORMAL
MONOCYTES # BLD AUTO: 0.45 K/UL
MONOCYTES NFR BLD AUTO: 7.6 %
NEUTROPHILS # BLD AUTO: 3.78 K/UL
NEUTROPHILS NFR BLD AUTO: 63.8 %
NITRITE URINE: NEGATIVE
NONHDLC SERPL-MCNC: 105 MG/DL
NONHDLC SERPL-MCNC: 120 MG/DL
PH URINE: 6
PLATELET # BLD AUTO: 200 K/UL
POTASSIUM SERPL-SCNC: 4.1 MMOL/L
POTASSIUM SERPL-SCNC: 4.3 MMOL/L
PROT SERPL-MCNC: 6.4 G/DL
PROT SERPL-MCNC: 6.5 G/DL
PROTEIN URINE: NEGATIVE MG/DL
RBC # BLD: 4.04 M/UL
RBC # FLD: 14 %
RED BLOOD CELLS URINE: 1 /HPF
SODIUM SERPL-SCNC: 140 MMOL/L
SODIUM SERPL-SCNC: 141 MMOL/L
SPECIFIC GRAVITY URINE: 1.01
TRIGL SERPL-MCNC: 135 MG/DL
TRIGL SERPL-MCNC: 151 MG/DL
UROBILINOGEN URINE: 0.2 MG/DL
WBC # FLD AUTO: 5.92 K/UL
WHITE BLOOD CELLS URINE: 6 /HPF

## 2023-11-17 PROCEDURE — 36415 COLL VENOUS BLD VENIPUNCTURE: CPT

## 2023-11-17 PROCEDURE — 90662 IIV NO PRSV INCREASED AG IM: CPT

## 2023-11-17 PROCEDURE — G0008: CPT

## 2023-11-17 PROCEDURE — 99214 OFFICE O/P EST MOD 30 MIN: CPT | Mod: 25

## 2023-11-17 RX ORDER — VALSARTAN AND HYDROCHLOROTHIAZIDE 320; 25 MG/1; MG/1
320-25 TABLET, FILM COATED ORAL
Qty: 90 | Refills: 3 | Status: ACTIVE | COMMUNITY
Start: 2017-10-24 | End: 1900-01-01

## 2023-11-17 RX ORDER — CARVEDILOL 25 MG/1
25 TABLET, FILM COATED ORAL
Qty: 180 | Refills: 3 | Status: ACTIVE | COMMUNITY
Start: 2019-01-28 | End: 1900-01-01

## 2023-11-17 RX ORDER — AMLODIPINE BESYLATE 2.5 MG/1
2.5 TABLET ORAL DAILY
Qty: 90 | Refills: 3 | Status: ACTIVE | COMMUNITY
Start: 2021-03-24 | End: 1900-01-01

## 2023-12-03 ENCOUNTER — RX CHANGE (OUTPATIENT)
Age: 78
End: 2023-12-03

## 2023-12-03 RX ORDER — OMEPRAZOLE 20 MG/1
20 CAPSULE, DELAYED RELEASE ORAL
Qty: 30 | Refills: 5 | Status: DISCONTINUED | COMMUNITY
Start: 2019-05-29 | End: 2023-12-03

## 2023-12-03 RX ORDER — OMEPRAZOLE 20 MG/1
20 CAPSULE, DELAYED RELEASE ORAL
Qty: 90 | Refills: 2 | Status: ACTIVE | COMMUNITY
Start: 1900-01-01 | End: 1900-01-01

## 2023-12-19 NOTE — PATIENT PROFILE ADULT - NSPROSPHOSPCHAPLAINYN_GEN_A_NUR
Latest Reference Range & Units 23 12:01 23 10:20   Estradiol pg/mL  79   FSH mIU/mL 14.7 25.0      Latest Reference Range & Units 23 10:20   Anti-Mullerian Hormone 0.150 - 7.500 ng/mL 0.100 (L)   (L): Data is abnormally low      Latest Reference Range & Units 23 12:01    TSH 0.30 - 4.20 uIU/mL 2.34        Latest Reference Range & Units 23 12:01   Prolactin 5 - 23 ng/mL 5       Caleb Talley is a 38 year old  who has secondary oligomenorrhea since . Patient's last menstrual period was 2023 (exact date). I called patient with an  to discuss recent lab results. Discussed I am concerned she may have premature ovarian insufficiency. I recommend repeating the labs with her next period to confirm. Discussed option of OCP versus HRT.     She prefers to start HRT and not recheck hormones.   She is already taking vitamin D supplement.    1. Premature ovarian insufficiency    - estradiol (ESTRACE) 2 MG tablet; Take 1 tablet (2 mg) by mouth daily  Dispense: 90 tablet; Refill: 3  - progesterone (PROMETRIUM) 200 MG capsule; Take 1 capsule (200 mg) by mouth daily Take for the first 12 days of the month.  Dispense: 36 capsule; Refill: 3    2. Secondary oligomenorrhea    - estradiol (ESTRACE) 2 MG tablet; Take 1 tablet (2 mg) by mouth daily  Dispense: 90 tablet; Refill: 3  - progesterone (PROMETRIUM) 200 MG capsule; Take 1 capsule (200 mg) by mouth daily Take for the first 12 days of the month.  Dispense: 36 capsule; Refill: 3      Due to language barrier, a [employer] phone  was used during the discussion with this patient.    Janna Jones MD    no

## 2023-12-29 ENCOUNTER — OUTPATIENT (OUTPATIENT)
Dept: OUTPATIENT SERVICES | Facility: HOSPITAL | Age: 78
LOS: 1 days | Discharge: HOME | End: 2023-12-29

## 2023-12-29 DIAGNOSIS — Z98.891 HISTORY OF UTERINE SCAR FROM PREVIOUS SURGERY: Chronic | ICD-10-CM

## 2023-12-29 DIAGNOSIS — C50.919 MALIGNANT NEOPLASM OF UNSPECIFIED SITE OF UNSPECIFIED FEMALE BREAST: ICD-10-CM

## 2023-12-29 DIAGNOSIS — Z90.711 ACQUIRED ABSENCE OF UTERUS WITH REMAINING CERVICAL STUMP: Chronic | ICD-10-CM

## 2023-12-29 DIAGNOSIS — Z90.5 ACQUIRED ABSENCE OF KIDNEY: Chronic | ICD-10-CM

## 2023-12-29 DIAGNOSIS — Z92.21 PERSONAL HISTORY OF ANTINEOPLASTIC CHEMOTHERAPY: Chronic | ICD-10-CM

## 2024-01-29 ENCOUNTER — APPOINTMENT (OUTPATIENT)
Dept: HEMATOLOGY ONCOLOGY | Facility: CLINIC | Age: 79
End: 2024-01-29
Payer: MEDICARE

## 2024-01-29 VITALS
DIASTOLIC BLOOD PRESSURE: 94 MMHG | HEIGHT: 65 IN | RESPIRATION RATE: 16 BRPM | SYSTOLIC BLOOD PRESSURE: 153 MMHG | WEIGHT: 192 LBS | BODY MASS INDEX: 31.99 KG/M2 | OXYGEN SATURATION: 98 % | HEART RATE: 80 BPM | TEMPERATURE: 96.8 F

## 2024-01-29 PROCEDURE — 99213 OFFICE O/P EST LOW 20 MIN: CPT

## 2024-01-29 NOTE — ASSESSMENT
[FreeTextEntry1] :  76 y/o woman with history of HTN on multiple medications, kidney donor/CKD, ER-SC-HER2-, lymph-node positive right breast cancer anatomic stage IIB, AJCC 8th edition clinical prognostic stage IIIB, diagnosed in 2019. Initially was on neoadjuvant chemotherapy (dose-dense AC-->T) with course c/b neuropathy and a new rash after taxol, infusion reactions to both taxol and taxotere very quickly into infusion initiation. CT chest for cough 4/2019 with stable 3mm pulm nodules, decreased size of breast mass and axillary LN; US 4/17 with decrease in size of breast mass and axillary LN. She completed 4 cycles of CMF with OnPro q2 weeks and on  7/11/19 b/l mastectomy with residual disease, negative margins (T1bN0(i+)Mx). S/p adjuvant radiation and started on adjuvant Xeloda (2000mg PO BID, 2 weeks on, 1 week off).  Now completed, doing well.  -on active surveillance - discussed postmastectomy PT for tightness of chest wall - patient declines at this time; declines additional evaluation by plastics -clinically JOSE DANIEL x 5 years -not following with breast surgeon any longer -reviewed recent labs from 11/2023  - continued surveillance brain imaging in 8/2024 and follow-up with neuro-onc in 9/2024 - continued follow up with dermatology every 6 months - Patient had the opportunity to have all their questions answered to their satisfaction  - recommended annual f/up but patient requests to continue to come every 6 months.   PMD at least annually with lipid checks/bloodwork, gyn at least annually and PAP test screening per their recommendations, colon cancer screening/colonoscopy at least every 10 years as recommended by PMD/GI , dermatology for annual skin checks  ophthalmology for annual eye exams - has cologard annually w/ pcp - does not follow with gyn any longer s/p hysterectomy

## 2024-01-29 NOTE — REVIEW OF SYSTEMS
[Muscle Pain] : muscle pain [Difficulty Walking] : difficulty walking [Negative] : Allergic/Immunologic [de-identified] : neuropathy in her feet due to diabetes

## 2024-01-29 NOTE — HISTORY OF PRESENT ILLNESS
[Disease: _____________________] : Disease: [unfilled] [T: ___] : T[unfilled] [N: ___] : N[unfilled] [AJCC Stage: ____] : AJCC Stage: [unfilled] [de-identified] : Patient was seen by her PMD who sent her for a screening mammogram (Last mammogram )  after she palpated a mass on the R side of her chest 2018. She had been having body aches/soreness and feeling general malaise since November, despite a course of antibiotics (just after flu shot administered). Also some R shoulder/arm pains. She has no prior history of abnormal breast imaging, breast biopsies, or surgeries.\par  \par  18 Mammogram screenin.7 cm irregular spiculated mass upper outer right breast 8 cm FN with associated architectural distortion and several associated microcalcifications. Partially included enlarged right axillary lymph nodes. BIRADS 0\par  \par  18 bilateral breast US: R breast 10:00 7 cm FN 2.6 cm irregular hypoechoic mass (biopsy recommended). Inferior right axillary lymph node with focal area of eccentric cortical thickening (recommend US guided biopsy). 9-10 o'clock 3 cm from the nipple and 9-10 o'clock 6 cm FN hypoechoic nodules located adjacent to the dominant mass felt to likely represent satellite lesions.\par  \par  1/3/19 US guided core biopsy R breast 10:00 7 cm FN: Invasive moderately differentiated ductal carcinoma with apocrine cytology and desmoplastic stroma. Michael 7/. 1.4cm involved, DCIS cribiform pattern with high grade nuclear atypia and apocrine cytology very focally present. (coil shaped clip) R axillary lymph node: metastatic ductal carcinoma with apocrine cytology involving a lymph node (hydromark marking clip) ER 0% DC 0% HER2 IHC 0 negative\par  \par  She saw Dr. Demetrio Robertson who recommended medical oncology consultation for neoadjuvant chemotherapy. \par  \par  She noted on initial visit with me some occasional body aches still and fatigue over the last month. She notes an intermittent tongue sensation - like a scald from food (though she does not recall an episode of this). Symptoms intermittent for months. Otherwise she feels well. She is active at baseline, uses stationary bike at home most days for exercise. \par  \par  19 PET/CT: Superolateral R breast and R axillary lymph nodes FDG-avid. S/p left nephrectomy. Few subcm b/l pulmonary nodules are nonspecific. Please correlate clinically for infection and follow up with CT chest in 1 month.  MRI Brain w/wo contrast 19: L maxillary polyp v. retention cyst. Non enhancing area of abnormal T2 prolongation involving left thalamic region, nonspecific. Stable on repeat 2019. Following with Dr. Jacobs.  CT Chest 19: No interval changes since 19. Stable 3.3 x 2.1 cm UOQ R breast, 2 x 1.2 cm R axillary lymph node. Stable 4 mm and smaller scattered indeterminant pulmonary nodules.\par  \par  She started neoadjuvant chemotherapy with AC on 19. Taxol #1 3/27.\par  Taxol #2 with grade 3 infusion reaction, vasculitic rash development. Taxol reattempt with similar. Taxotere attempt  with chest discomfort, acute worsening of rash/itching, severe back pain radiating into the chest, a few minutes into infusion. \par  Breast US performed with response to therapy noted.\par  Also c/b steroid induced hyperglycemia (A1C 8.0) - initiated home glucose monitoring and PMD follow up.\par  \par  CMF #1 with neulasta onpro started 19 without event; 4 cycles completed.\par  \par  b/l mastectomy 19 (Dr. Robertson): R breast: IDC, mod diff, 5.1mm (residual disease ranging in size from 1-2mm in tumor bed, approx 10-20% viable cells in 1.9x1.5x1.0cm tumor bed post-neoadjuvant treatment), DCIS grade 2-3 (4mm, 5/14 blocks), 2/4 sentinel nodes with ITCs on permanent section only. Margins negative\par  [id number](i+)\par  ER-DC-HER2 IHC 0 negative, PDL <1%.\par  \par  She completed post-mastectomy RT to the right braest c/b radiation dermatitis.\par  \par  She started adjuvant xeloda for residual disease (2000mg PO BID 2 weeks on, 1 week off) in Oct 2019. C1 xeloda with skin cracking, q2 diarrhea, decreased dose, completed nearly 7 cycles, held in light of COVID19 pandemic with high risk given close + contacts and per patient's request. Xeloda completed 2020. \par  \par  No family history of breast or ovarian cancer. [de-identified] : ER-RI-HER2- [de-identified] : 1/29/2024 Patient returns today to rule out progression or recurrence of breast cancer; On active surveillance Denies any chest wall masses or skin change - patient is very unhappy with results of her mastectomy surgery - had considered plastic surgery, but no longer interested. Patient denies any SOB, CP, abdominal pain, bone pain, headache, or unexplained weight loss Notes chest wall tightness since surgery, not had PT; not causing difficulty with ADLS Not following with surgeon Dr. Robertson any longer Recent labs reviewed 11/2023  HEALTH MAINTENANCE PMD Lance Lafleur, PMD up to date, last seen 4/2023, mgt of DM, sees q3mos.  Good energy level.  CARDS Dr. Lorrie Yadav; Echo has been normal; following every 6 months DEXA - She never had one. Not interested in it, discussed previously  GI Colonoscopy - Had one many years prior but does not remember when it was done and how it was, reportedly no polyps. Stool occult blood screening 6/2021 with PMD negative, advised follow up with Dr. Lafleur; patient also notes she had cologard in 2023 July  BCC of skin removed, Dr. 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 Hale. GYN Pap smear - s/p CASSIE for fibroid uterus in 1985, declines further follow up RENAL Single kidney (kidney donor to brother in 1982) - baseline Cr 1.2-1.3 though patient notes no prior renal issues NEURO - Dr Jacobs now transitioned to Dr. Gustafson ; MR Head, 8/3/20- Re-demonstration 1.7 x 2.9 x 1.7 cm AP, TR, CC left thalamic pulvinar enlargement with T2, FLAIR hyperintensity, no rCBV or rCBF increase, differential includes low-grade neoplasm or atypical hamartoma, without interval change from prior.  Unchanged mild volume loss, microvascular disease, no new restricted diffusion, hemorrhage or midline shift. Last follow up with Dr. Jacobs 8/23/21, rec FU in 1 year with MRI brain. MRI brain 8/22 stable. - f/up scheduled with Dr. Nagi Gustafson in 9/2023 Single, lives with her son Richard. She has a daughter as well who works at [employer]. She is a retired RN. She notes she has exercising on her bike.  Walks a lot; feels she had a lot of muscle aches and worsening neuropathy of the feet after COVID vaccinations; using Vicks topically with improvement Had COVID exposure, asx COVID infection.  tested covid ab positive 4/20 s/p COVID vaccine and COVID booster in 8/2021 retired RN

## 2024-01-29 NOTE — PHYSICAL EXAM
[Fully active, able to carry on all pre-disease performance without restriction] : Status 0 - Fully active, able to carry on all pre-disease performance without restriction [Normal] : affect appropriate [de-identified] : b/l mastectomies with skin outpouching laterally, no palpable masses or adenopathy,  RT related skin changes noted right chest wall no erythema [de-identified] : many moles over skin UE/LEs

## 2024-01-29 NOTE — REASON FOR VISIT
Wants to speak with Morenita regarding getting the hospital bed   Please call her back                                                        Med: insulin syringes   Dosage:   Sig:    Quantity requested:  90 day supply         Mail Order: yes, 90 day supply  Pharmacy  has been set up and verified.   [Follow-Up Visit] : a follow-up [FreeTextEntry2] : Right Breast Cancer - transferring care from Dr. Thacker to me

## 2024-02-05 ENCOUNTER — RX RENEWAL (OUTPATIENT)
Age: 79
End: 2024-02-05

## 2024-02-05 RX ORDER — METFORMIN ER 500 MG 500 MG/1
500 TABLET ORAL DAILY
Qty: 90 | Refills: 1 | Status: ACTIVE | COMMUNITY
Start: 2022-09-19 | End: 1900-01-01

## 2024-03-18 ENCOUNTER — APPOINTMENT (OUTPATIENT)
Dept: INTERNAL MEDICINE | Facility: CLINIC | Age: 79
End: 2024-03-18
Payer: MEDICARE

## 2024-03-18 VITALS
WEIGHT: 191 LBS | DIASTOLIC BLOOD PRESSURE: 82 MMHG | HEIGHT: 65 IN | SYSTOLIC BLOOD PRESSURE: 124 MMHG | OXYGEN SATURATION: 95 % | BODY MASS INDEX: 31.82 KG/M2 | RESPIRATION RATE: 16 BRPM | HEART RATE: 84 BPM

## 2024-03-18 DIAGNOSIS — R79.89 OTHER SPECIFIED ABNORMAL FINDINGS OF BLOOD CHEMISTRY: ICD-10-CM

## 2024-03-18 DIAGNOSIS — R25.2 CRAMP AND SPASM: ICD-10-CM

## 2024-03-18 DIAGNOSIS — G40.89 OTHER SEIZURES: ICD-10-CM

## 2024-03-18 DIAGNOSIS — R10.13 EPIGASTRIC PAIN: ICD-10-CM

## 2024-03-18 DIAGNOSIS — C50.919 MALIGNANT NEOPLASM OF UNSPECIFIED SITE OF UNSPECIFIED FEMALE BREAST: ICD-10-CM

## 2024-03-18 DIAGNOSIS — Z52.4 KIDNEY DONOR: ICD-10-CM

## 2024-03-18 DIAGNOSIS — E83.42 HYPOMAGNESEMIA: ICD-10-CM

## 2024-03-18 LAB
25(OH)D3 SERPL-MCNC: 47.2 NG/ML
ALBUMIN SERPL ELPH-MCNC: 4.4 G/DL
ALP BLD-CCNC: 62 U/L
ALT SERPL-CCNC: 20 U/L
ANION GAP SERPL CALC-SCNC: 13 MMOL/L
AST SERPL-CCNC: 17 U/L
BILIRUB SERPL-MCNC: 0.2 MG/DL
BUN SERPL-MCNC: 27 MG/DL
CALCIUM SERPL-MCNC: 9.5 MG/DL
CHLORIDE SERPL-SCNC: 102 MMOL/L
CHOLEST SERPL-MCNC: 164 MG/DL
CO2 SERPL-SCNC: 24 MMOL/L
CREAT SERPL-MCNC: 1.28 MG/DL
EGFR: 43 ML/MIN/1.73M2
ESTIMATED AVERAGE GLUCOSE: 169 MG/DL
FRUCTOSAMINE SERPL-MCNC: 285 UMOL/L
GLUCOSE SERPL-MCNC: 190 MG/DL
HBA1C MFR BLD HPLC: 7.5 %
HDLC SERPL-MCNC: 63 MG/DL
LDLC SERPL CALC-MCNC: 80 MG/DL
MAGNESIUM SERPL-MCNC: 1.4 MG/DL
NONHDLC SERPL-MCNC: 101 MG/DL
POTASSIUM SERPL-SCNC: 4.2 MMOL/L
PROT SERPL-MCNC: 6.5 G/DL
SODIUM SERPL-SCNC: 139 MMOL/L
TRIGL SERPL-MCNC: 122 MG/DL

## 2024-03-18 PROCEDURE — 99214 OFFICE O/P EST MOD 30 MIN: CPT

## 2024-03-18 PROCEDURE — 36415 COLL VENOUS BLD VENIPUNCTURE: CPT

## 2024-03-18 PROCEDURE — G2211 COMPLEX E/M VISIT ADD ON: CPT

## 2024-03-18 NOTE — HISTORY OF PRESENT ILLNESS
[FreeTextEntry1] : Br Ca, HTN, HLD, DM, el CR [de-identified] : 76 y/o female with a hx of HTN , DM, kidney donor, Breast Ca - Invasive moderately differentiated ductal carcinoma with apocrine cytology and desmoplastic stroma s/p b/l mastectomy 7/11/19 and chemo and xrt.   Had tested covid ab positive 4/20 - reports that she has had recurrent sx every other mo.  Mild, better and resolved with otc rx x1.   Had been planning on having repeat reconstructive surgery.   Has been followed with cardiology. Has been maintained on current rx.  following with oncology closely - recent f/u reviewed.   following with derm - Note since last time reviewed. s/p port removal Following with neurology    s/p mechanical fall causes by her dog with injuries in the past diarrhea - Has been better with the metformin ER  Some headache after getting the covid booster.  she will be going for the second booster.  Reports that she had reaction to the second booster.   Has not been checking FS glucose.   BP has been controlled. appetite has been about the same - fair. Has been following better diet.  With issues from the ? adverse affects of the med. Walking and going on the bike for exercise.  Has been daily.  Was not using after getting the port out - has been walking daily.  Has been feeling strong.  Walking more with some muscle aches.  some improvemnt with rubbing on vicks Repots some ? numbness at the feet.   Ear and nose have been better.  taste has been better    Ophtho - ? July  mammo/sono - Br Ca as noted. pap overdue.  Has not been going - s/p hysterectomy  fit 8/23 gets flu vaccine had prevnar Pneumovax - [facility] - ~ 2018 Had covid vaccine x2 - had boosters Reprots zoster vaccine ` 2014

## 2024-03-18 NOTE — REVIEW OF SYSTEMS
[Diarrhea] : diarrhea [Negative] : Heme/Lymph [Fever] : no fever [Chills] : no chills [Hot Flashes] : no hot flashes [Fatigue] : no fatigue [Night Sweats] : no night sweats [Recent Change In Weight] : ~T no recent weight change [FreeTextEntry9] : aches improved as noted.  [FreeTextEntry7] : altered taste from the chemo - 90% better [FreeTextEntry2] : sweats/diaphoresis with the hypoglycemic episodes

## 2024-03-18 NOTE — PHYSICAL EXAM
[Normal Posterior Cervical Nodes] : no posterior cervical lymphadenopathy [Normal Anterior Cervical Nodes] : no anterior cervical lymphadenopathy [Normal] : affect was normal and insight and judgment were intact [de-identified] : obese

## 2024-03-18 NOTE — HEALTH RISK ASSESSMENT
[Yes] : Yes [1 or 2 (0 pts)] : 1 or 2 (0 points) [Monthly or less (1 pt)] : Monthly or less (1 point) [Never (0 pts)] : Never (0 points) [No] : In the past 12 months have you used drugs other than those required for medical reasons? No [0] : 2) Feeling down, depressed, or hopeless: Not at all (0) [PHQ-2 Negative - No further assessment needed] : PHQ-2 Negative - No further assessment needed [Never] : Never [Audit-CScore] : 1 [[id number]] : 0

## 2024-03-19 LAB
ALBUMIN SERPL ELPH-MCNC: 4.5 G/DL
ALP BLD-CCNC: 51 U/L
ALT SERPL-CCNC: 13 U/L
ANION GAP SERPL CALC-SCNC: 13 MMOL/L
AST SERPL-CCNC: 12 U/L
BILIRUB SERPL-MCNC: 0.3 MG/DL
BUN SERPL-MCNC: 30 MG/DL
CALCIUM SERPL-MCNC: 9.6 MG/DL
CHLORIDE SERPL-SCNC: 101 MMOL/L
CHOLEST SERPL-MCNC: 180 MG/DL
CO2 SERPL-SCNC: 26 MMOL/L
CREAT SERPL-MCNC: 1.26 MG/DL
EGFR: 44 ML/MIN/1.73M2
ESTIMATED AVERAGE GLUCOSE: 171 MG/DL
FRUCTOSAMINE SERPL-MCNC: 281 UMOL/L
GLUCOSE SERPL-MCNC: 164 MG/DL
HBA1C MFR BLD HPLC: 7.6 %
HDLC SERPL-MCNC: 59 MG/DL
LDLC SERPL CALC-MCNC: 94 MG/DL
MAGNESIUM SERPL-MCNC: 1.4 MG/DL
NONHDLC SERPL-MCNC: 120 MG/DL
POTASSIUM SERPL-SCNC: 4 MMOL/L
PROT SERPL-MCNC: 6.7 G/DL
SODIUM SERPL-SCNC: 140 MMOL/L
TRIGL SERPL-MCNC: 153 MG/DL

## 2024-04-10 ENCOUNTER — NON-APPOINTMENT (OUTPATIENT)
Age: 79
End: 2024-04-10

## 2024-04-10 ENCOUNTER — APPOINTMENT (OUTPATIENT)
Dept: CARDIOLOGY | Facility: CLINIC | Age: 79
End: 2024-04-10
Payer: MEDICARE

## 2024-04-10 VITALS
OXYGEN SATURATION: 100 % | SYSTOLIC BLOOD PRESSURE: 132 MMHG | WEIGHT: 191 LBS | BODY MASS INDEX: 31.82 KG/M2 | HEIGHT: 65 IN | HEART RATE: 73 BPM | DIASTOLIC BLOOD PRESSURE: 72 MMHG

## 2024-04-10 DIAGNOSIS — Z92.21 PERSONAL HISTORY OF ANTINEOPLASTIC CHEMOTHERAPY: ICD-10-CM

## 2024-04-10 DIAGNOSIS — E78.5 HYPERLIPIDEMIA, UNSPECIFIED: ICD-10-CM

## 2024-04-10 DIAGNOSIS — I10 ESSENTIAL (PRIMARY) HYPERTENSION: ICD-10-CM

## 2024-04-10 DIAGNOSIS — E66.9 OBESITY, UNSPECIFIED: ICD-10-CM

## 2024-04-10 DIAGNOSIS — E11.9 TYPE 2 DIABETES MELLITUS W/OUT COMPLICATIONS: ICD-10-CM

## 2024-04-10 DIAGNOSIS — Z92.3 PERSONAL HISTORY OF IRRADIATION: ICD-10-CM

## 2024-04-10 PROCEDURE — 93000 ELECTROCARDIOGRAM COMPLETE: CPT

## 2024-04-10 PROCEDURE — 99215 OFFICE O/P EST HI 40 MIN: CPT | Mod: 25

## 2024-04-13 PROBLEM — Z92.21 HISTORY OF CANCER CHEMOTHERAPY: Status: ACTIVE | Noted: 2019-05-29

## 2024-04-13 PROBLEM — I10 CHRONIC HYPERTENSION: Status: ACTIVE | Noted: 2017-12-22

## 2024-04-13 PROBLEM — E78.5 HYPERLIPIDEMIA: Status: ACTIVE | Noted: 2019-02-13

## 2024-04-13 PROBLEM — E66.9 OBESITY: Status: ACTIVE | Noted: 2018-12-03

## 2024-04-13 PROBLEM — Z92.3 HISTORY OF RADIATION THERAPY: Status: ACTIVE | Noted: 2019-08-28

## 2024-04-13 PROBLEM — E11.9 DIABETES MELLITUS: Status: ACTIVE | Noted: 2019-01-04

## 2024-04-13 NOTE — HISTORY OF PRESENT ILLNESS
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 hypertension , Hyperlipidemia,  BMI  31 STRESS: No ischemia on EST plain  s/p ACT; Completed all neoadjuvant chemotherapy in July 2019.  radiation treatment for 25 cycles to be completed feb 22. 2020 Xeloda twice daily for two weeks and one week off treatment in rotation. S/P bilateral mastectomy without reconstruction.     Interval Note February 19, 2020. on capecitabine scheduled for a MOHS procedure with Dr.Jessica Johnson  to excise an atypical intradermal melanocytic proliferation on her right forearm.   Repeat echo was performed today.  Interval follow up 9/2/2020 onc notes reviewed in October 2020 s/p asx covid + s/p treatment, RT awaiting plastics reconstruction   Interval  exercising stationary bike 45 mins a day ROS: Denies Chest pain, dyspnea, palpitations, dizziness or syncope.  Interval Note March 24, 2021  Patient returns for a routine cardiac follow up. 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 hypertension , Hyperlipidemia and diabetes. BMI  31  S/P ACT; Completed all neoadjuvant chemotherapy in July 2019.  Radiation treatment for 25 cycles to be completed Feb 22. 2020 Xeloda twice daily for two weeks and one week off treatment in rotation. S/P bilateral mastectomy without reconstruction.     Patient discontinued treatment last March 2020 per oncology during the start of the COVID pandemic. Of note, patient had COVID virus in April of 2020 with mild symptoms.  She has received the first vaccination of Moderna last week. Blood pressure today is elevated. She reports that she just took her morning medication.   Echocardiogram was performed this morning:  Normal LV function Reduced global strain when compared to previous study  Interval follow-up July 28, 2021 Pending port removal this week EKG July 28, 2021 normal sinus rhythm Blood pressure controlled on current medication ROS: Denies Chest pain, dyspnea, palpitations, dizziness or syncope.  Interval Note April 6, 2022  Ms Moran is now 76 years old with the following history: -S/P  breast cancer:  ER -  MS -   Her 2/  Adalgisa- right  breast cancer. -S/P radiation therapy -S/P medi port removal  -July, 2021 -hypertension controlled  -Hyperlipidemia controlled -diabetes  walking 4 miles /day ROS: Denies Chest pain, dyspnea, palpitations, dizziness or syncope.  Interval Note October 11, 2022  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, retired RN with the following history: -S/P  breast cancer:  ER -  MS -   Her 2/  Adalgisa- right  breast cancer.: moderately differentiated ductal CA -Hx of bilateral mastectomy-> July 11, 2019-> no reconstruction -Hx of Chemotherapy -Hx of  radiation therapy -S/P medi port removal  -July, 2021 -Hx of hypertension   -Hx of Hyperlipidemia  -Hx of diabetes -Hx of kidney donor -Hx of Obesity -Hx of COVID- 19 infection in May of 2022  Repeat echocardiogram performed today, October 12, 2022 Results: Normal mitral valve Mild-moderate MR Normal L internal dimensions and wall thicknesses Normal LV systolic function No segmental wall motion abnormalities Global longitudinal strain -19% (normal) Normal diastolic function Normal RV size and function   ***Compared with echo from 3/24/21, GLS has improved (prior -15.4%)  Blood pressure today: 134/ 84 EKG- Sinus rhythm @ 74 bpm  Patient reports feeling well and offers no complaints  Interval follow up 6/21/23  Patient is here for follow-up today Complains of symptoms after COVID- vaccine ROS: Denies Chest pain, dyspnea, palpitations, dizziness or syncope.  Interval follow up 4/10/24   c/o diabetic retinopathy  ROS: Denies Chest pain, dyspnea, palpitations, dizziness or syncope. compliant with medication compliant with diet   No recent hospitalization No new medication   Here for routine follow up no complaints today.

## 2024-04-13 NOTE — PHYSICAL EXAM
"  History     Chief Complaint   Patient presents with     Leg Pain     HPI  Amy Mcgowan is a 21 year old female with history of anxiety/depression and hypothyroidism who presents for evaluation of left leg redness and swelling. She reports swelling and redness to her left thigh for the past 2-3 days, initially improved after some drainage but then worsening again. She denies significant relief with OTC pain medication. She denies fevers/chills, nausea/vomiting, headache, vision changes, chest pain, difficulty breathing, abdominal pain, other leg swelling, or any other complaints.     Allergies:  Allergies   Allergen Reactions     Acetaminophen Hives and Other (See Comments)     States \"I have an allergic reaction\" hives  States \"I have an allergic reaction\" hives       Aspirin Other (See Comments)     Her face became numb and her hands , also light headed . And became anxiouse  Fainting and hyperventalation         Problem List:    There are no active problems to display for this patient.       Past Medical History:    History reviewed. No pertinent past medical history.    Past Surgical History:    History reviewed. No pertinent surgical history.    Family History:    History reviewed. No pertinent family history.    Social History:  Marital Status:  Single [1]  Social History     Tobacco Use     Smoking status: Current Every Day Smoker     Smokeless tobacco: Never Used     Tobacco comment: pack/week   Substance Use Topics     Alcohol use: Yes     Comment: OFF AND ON, RARE     Drug use: Yes     Types: Marijuana     Comment: everyday        Medications:    cephALEXin (KEFLEX) 500 MG capsule  clonazePAM (KLONOPIN) 1 MG tablet  clonazePAM (KLONOPIN) 2 MG tablet  doxycycline hyclate (VIBRAMYCIN) 100 MG capsule  etonogestrel (NEXPLANON) 68 MG IMPL  levothyroxine (SYNTHROID/LEVOTHROID) 75 MCG tablet  oxyCODONE (ROXICODONE) 5 MG tablet          Review of Systems  10-point ROS complete and negative other than as " "noted in HPI above    Physical Exam   BP: (!) 143/96  Pulse: 126  Temp: 98.4  F (36.9  C)  Resp: 20  Height: 171.5 cm (5' 7.5\")  Weight: 60.8 kg (134 lb 1.6 oz)  SpO2: 100 %      Physical Exam  Gen: Lying in bed, appears uncomfortable but no acute distress, alert  HEENT: NC/AT, MMN  CV: Sinus tachycardia, appears warm and well-perfused  Pulm: CTAB, normal respiratory effort  Abd: Soft, NT, ND  Skin: Erythema and swelling to left posterolateral thigh, approximately 6 x 6 cm area of erythema with 4 x 4 cm area of induration with appreciable fluctuance. Area exquisitely TTP. No active drainage of pus or blood, no open wound visualized.  MSK: No gross deformities  Neuro: A&O x4, no focal deficits, CN II-XII Grossly intact    ED Course     ED Course as of Mar 25 0740   Wed Mar 24, 2021   0921 I&D performed of left posterior thigh abscess, minimal bloody drainage, plan for antibiotics, concern for MRSA, close outpatient 2 days follow-up for wound check        [facility]    PROCEDURE: -Incision/Drainage    Date/Time: 3/24/2021 9:30 AM  Performed by: Jesus Diego MD  Authorized by: Jesus Diego MD       LOCATION:      Type:  Abscess    Size:  4 x 4 c,    Location:  Lower extremity    Lower extremity location:  Leg    Leg location:  L upper leg    ANESTHESIA (see MAR for exact dosages):     Anesthesia method:  Local infiltration    Local anesthetic:  Lidocaine 1% w/o epi    PROCEDURE DETAILS:     Needle aspiration: no      Incision types:  Single straight    Incision depth:  Subcutaneous    Scalpel blade:  11    Wound management:  Irrigated with saline    Drainage:  Bloody    Drainage amount:  Scant    Wound treatment:  Wound left open    Packing materials:  1/4 in iodoform gauze    Amount 1/4\" iodoform:  5 cm  Post-procedure details:     PROCEDURE   Patient Tolerance:  Patient tolerated the procedure well with no immediate complications                     Critical " Care time:  none               No results found for this or any previous visit (from the past 24 hour(s)).    Medications   oxyCODONE (ROXICODONE) tablet 10 mg (10 mg Oral Given 3/24/21 5747)   lidocaine 1 % 10 mL (5 mLs Intradermal Given by Other Clinician 3/24/21 5247)       Assessments & Plan (with Medical Decision Making)   21-year-old with history of anxiety/depression who presents with left thigh abscess with no evidence of systemic infection. Vitally stable, afebrile. Tachycardia noted, likely due to pain, did improve after oxycodone and s/p I&D. Patient given oxycodone for pain prior to I&D. Procedure note above, scant bloody drainage, suspect early abscess formation. Given significant cellulitis component and concern for community MRSA, will treat with both keflex (for strep coverage) and doxycycline (for MRSA coverage). Short course of oxycodone for breakthrough pain. Patient appropriate for discharge with close outpatient follow up in 2 days for wound check and wick removal, careful return precautions provided.    From ED discharge instructions:  You were found to have an infection of your leg and early abscess. This was incised and drained.    You will need to start taking antibiotics (keflex and doxycycline) as prescribed starting right away today. Keep taking as prescribed until gone even if infection goes away.    Take ibuprofen 400 mg every 6 hours as needed for pain.   Take oxycodone 5 mg every 6 hours as needed for pain not controlled by ibuprofen.    Keep the dressing clean and dry until follow-up.    Follow up in 2 days for a wound check and wick removal either with your primary doctor in clinic; call [facility] to schedule this appointment if you do not have a primary provider.    Come back to the emergency department if new fever greater than 101 degrees Fahrenheit, vomiting and inability to eat or drink, increasing redness or drainage of more pus, or any other acute concerns.        I  have reviewed the nursing notes.    I have reviewed the findings, diagnosis, plan and need for follow up with the patient.       Discharge Medication List as of 3/24/2021  9:31 AM      START taking these medications    Details   cephALEXin (KEFLEX) 500 MG capsule Take 1 capsule (500 mg) by mouth 4 times daily for 7 days, Disp-28 capsule, R-0, E-Prescribe      doxycycline hyclate (VIBRAMYCIN) 100 MG capsule Take 1 capsule (100 mg) by mouth 2 times daily for 7 days, Disp-14 capsule, R-0, E-Prescribe      oxyCODONE (ROXICODONE) 5 MG tablet Take 1 tablet (5 mg) by mouth every 6 hours as needed for breakthrough pain, Disp-6 tablet, R-0, E-Prescribe             Final diagnoses:   Cellulitis of left thigh   Abscess of left thigh       3/24/2021   [facility] Jesus Goldberg MD  03/25/21 6352     [Well Developed] : well developed [No Acute Distress] : no acute distress [Well Nourished] : well nourished [Normal Voice Quality] : was normal [Normal Verbal Skills] : the patient had normal verbal communication skills [Normal Conjunctiva] : normal conjunctiva [Normal Venous Pressure] : normal venous pressure [No Carotid Bruit] : no carotid bruit [Normal S1, S2] : normal S1, S2 [No Murmur] : no murmur [No Rub] : no rub [No Gallop] : no gallop [Normal Rate] : normal [Rhythm Regular] : regular [Normal S1] : normal S1 [Normal S2] : normal S2 [Clear Lung Fields] : clear lung fields [Good Air Entry] : good air entry [No Respiratory Distress] : no respiratory distress  [Normal] : soft, non-tender, no masses/organomegaly, normal bowel sounds [Soft] : abdomen soft [Non Tender] : non-tender [No Masses/organomegaly] : no masses/organomegaly [Normal Bowel Sounds] : normal bowel sounds [Normal Gait] : normal gait [No Edema] : no edema [No Cyanosis] : no cyanosis [No Clubbing] : no clubbing [No Varicosities] : no varicosities [No Rash] : no rash [No Skin Lesions] : no skin lesions [Moves all extremities] : moves all extremities [No Focal Deficits] : no focal deficits [Normal Speech] : normal speech [Alert and Oriented] : alert and oriented [Normal memory] : normal memory

## 2024-04-13 NOTE — ASSESSMENT
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, retired RN with the following history: -S/P  breast cancer:  [facility] -  [facility] -   Her 2/  [place]- right  breast cancer.: moderately differentiated ductal CA -Hx of bilateral mastectomy-> July 11, 2019-> no reconstruction -Hx of Chemotherapy -Hx of  radiation therapy -S/P medi port removal  -July, 2021 -Hx of hypertension   -Hx of Hyperlipidemia  -Hx of diabetes -Hx of kidney donor -Hx of Obesity -Hx of COVID- 19 infection in May of 2022   #Hypertension   Blood pressure under acceptable control   Continue with Amlodipine 2.5 mg QD   Continue with Valsartan- HCTZ  320- 25 mg QD  #S/P chemotherapy/radiation Echo ordered.  Continue with Carvedilol 25 mg BID  #Hyperlipidemia   Continue on Rosuvastatin 5 mg QHS  #Diabetes Follow-up with endocrine  Follow up in 6 months.

## 2024-05-03 ENCOUNTER — RX RENEWAL (OUTPATIENT)
Age: 79
End: 2024-05-03

## 2024-05-08 ENCOUNTER — APPOINTMENT (OUTPATIENT)
Dept: DERMATOLOGY | Facility: CLINIC | Age: 79
End: 2024-05-08

## 2024-05-08 RX ORDER — ROSUVASTATIN CALCIUM 5 MG/1
5 TABLET, FILM COATED ORAL
Qty: 90 | Refills: 1 | Status: ACTIVE | COMMUNITY
Start: 2019-02-13 | End: 1900-01-01

## 2024-06-26 ENCOUNTER — APPOINTMENT (OUTPATIENT)
Dept: DERMATOLOGY | Facility: CLINIC | Age: 79
End: 2024-06-26
Payer: MEDICARE

## 2024-06-26 DIAGNOSIS — Z85.89 PERSONAL HISTORY OF MALIGNANT NEOPLASM OF OTHER ORGANS AND SYSTEMS: ICD-10-CM

## 2024-06-26 DIAGNOSIS — Z12.83 ENCOUNTER FOR SCREENING FOR MALIGNANT NEOPLASM OF SKIN: ICD-10-CM

## 2024-06-26 DIAGNOSIS — L81.4 OTHER MELANIN HYPERPIGMENTATION: ICD-10-CM

## 2024-06-26 DIAGNOSIS — L57.0 ACTINIC KERATOSIS: ICD-10-CM

## 2024-06-26 DIAGNOSIS — L82.0 INFLAMED SEBORRHEIC KERATOSIS: ICD-10-CM

## 2024-06-26 PROCEDURE — 17110 DESTRUCTION B9 LES UP TO 14: CPT

## 2024-06-26 PROCEDURE — 99213 OFFICE O/P EST LOW 20 MIN: CPT | Mod: 25

## 2024-06-26 PROCEDURE — 17000 DESTRUCT PREMALG LESION: CPT | Mod: 59

## 2024-07-18 ENCOUNTER — APPOINTMENT (OUTPATIENT)
Dept: INTERNAL MEDICINE | Facility: CLINIC | Age: 79
End: 2024-07-18
Payer: MEDICARE

## 2024-07-18 VITALS
RESPIRATION RATE: 16 BRPM | HEART RATE: 81 BPM | OXYGEN SATURATION: 95 % | SYSTOLIC BLOOD PRESSURE: 126 MMHG | DIASTOLIC BLOOD PRESSURE: 82 MMHG | BODY MASS INDEX: 31.82 KG/M2 | WEIGHT: 191 LBS | HEIGHT: 65 IN

## 2024-07-18 DIAGNOSIS — R79.89 OTHER SPECIFIED ABNORMAL FINDINGS OF BLOOD CHEMISTRY: ICD-10-CM

## 2024-07-18 DIAGNOSIS — G40.89 OTHER SEIZURES: ICD-10-CM

## 2024-07-18 DIAGNOSIS — I10 ESSENTIAL (PRIMARY) HYPERTENSION: ICD-10-CM

## 2024-07-18 DIAGNOSIS — R10.13 EPIGASTRIC PAIN: ICD-10-CM

## 2024-07-18 DIAGNOSIS — E83.42 HYPOMAGNESEMIA: ICD-10-CM

## 2024-07-18 DIAGNOSIS — Z52.4 KIDNEY DONOR: ICD-10-CM

## 2024-07-18 DIAGNOSIS — E78.5 HYPERLIPIDEMIA, UNSPECIFIED: ICD-10-CM

## 2024-07-18 DIAGNOSIS — E66.9 OBESITY, UNSPECIFIED: ICD-10-CM

## 2024-07-18 DIAGNOSIS — C50.919 MALIGNANT NEOPLASM OF UNSPECIFIED SITE OF UNSPECIFIED FEMALE BREAST: ICD-10-CM

## 2024-07-18 DIAGNOSIS — R25.2 CRAMP AND SPASM: ICD-10-CM

## 2024-07-18 DIAGNOSIS — E11.9 TYPE 2 DIABETES MELLITUS W/OUT COMPLICATIONS: ICD-10-CM

## 2024-07-18 PROCEDURE — 36415 COLL VENOUS BLD VENIPUNCTURE: CPT

## 2024-07-18 PROCEDURE — G2211 COMPLEX E/M VISIT ADD ON: CPT

## 2024-07-18 PROCEDURE — 99214 OFFICE O/P EST MOD 30 MIN: CPT

## 2024-07-18 RX ORDER — PITAVASTATIN CALCIUM 2 MG/1
2 TABLET ORAL DAILY
Qty: 90 | Refills: 1 | Status: ACTIVE | COMMUNITY
Start: 2024-07-18 | End: 1900-01-01

## 2024-07-19 LAB
25(OH)D3 SERPL-MCNC: 59.2 NG/ML
ALBUMIN SERPL ELPH-MCNC: 4.4 G/DL
ALP BLD-CCNC: 55 U/L
ALT SERPL-CCNC: 19 U/L
ANION GAP SERPL CALC-SCNC: 17 MMOL/L
AST SERPL-CCNC: 16 U/L
BASOPHILS # BLD AUTO: 0.02 K/UL
BASOPHILS NFR BLD AUTO: 0.3 %
BILIRUB SERPL-MCNC: 0.2 MG/DL
BUN SERPL-MCNC: 32 MG/DL
CALCIUM SERPL-MCNC: 10 MG/DL
CHLORIDE SERPL-SCNC: 102 MMOL/L
CHOLEST SERPL-MCNC: 200 MG/DL
CK SERPL-CCNC: 104 U/L
CO2 SERPL-SCNC: 24 MMOL/L
CREAT SERPL-MCNC: 1.56 MG/DL
EGFR: 34 ML/MIN/1.73M2
EOSINOPHIL # BLD AUTO: 0.24 K/UL
EOSINOPHIL NFR BLD AUTO: 3.3 %
ESTIMATED AVERAGE GLUCOSE: 183 MG/DL
GLUCOSE SERPL-MCNC: 145 MG/DL
HBA1C MFR BLD HPLC: 8 %
HCT VFR BLD CALC: 38.8 %
HDLC SERPL-MCNC: 58 MG/DL
HGB BLD-MCNC: 12.2 G/DL
IMM GRANULOCYTES NFR BLD AUTO: 0.3 %
LDLC SERPL CALC-MCNC: 111 MG/DL
LYMPHOCYTES # BLD AUTO: 1.67 K/UL
LYMPHOCYTES NFR BLD AUTO: 23 %
MAGNESIUM SERPL-MCNC: 1.6 MG/DL
MAN DIFF?: NORMAL
MCHC RBC-ENTMCNC: 29.4 PG
MCHC RBC-ENTMCNC: 31.4 GM/DL
MCV RBC AUTO: 93.5 FL
MONOCYTES # BLD AUTO: 0.48 K/UL
MONOCYTES NFR BLD AUTO: 6.6 %
NEUTROPHILS # BLD AUTO: 4.82 K/UL
NEUTROPHILS NFR BLD AUTO: 66.5 %
NONHDLC SERPL-MCNC: 142 MG/DL
PLATELET # BLD AUTO: 233 K/UL
POTASSIUM SERPL-SCNC: 4.1 MMOL/L
PROT SERPL-MCNC: 6.7 G/DL
RBC # BLD: 4.15 M/UL
RBC # FLD: 13.8 %
SODIUM SERPL-SCNC: 143 MMOL/L
TRIGL SERPL-MCNC: 181 MG/DL
WBC # FLD AUTO: 7.25 K/UL

## 2024-07-23 ENCOUNTER — OUTPATIENT (OUTPATIENT)
Dept: OUTPATIENT SERVICES | Facility: HOSPITAL | Age: 79
LOS: 1 days | Discharge: HOME | End: 2024-07-23

## 2024-07-23 DIAGNOSIS — Z90.5 ACQUIRED ABSENCE OF KIDNEY: Chronic | ICD-10-CM

## 2024-07-23 DIAGNOSIS — Z90.711 ACQUIRED ABSENCE OF UTERUS WITH REMAINING CERVICAL STUMP: Chronic | ICD-10-CM

## 2024-07-23 DIAGNOSIS — C50.919 MALIGNANT NEOPLASM OF UNSPECIFIED SITE OF UNSPECIFIED FEMALE BREAST: ICD-10-CM

## 2024-07-23 DIAGNOSIS — Z98.891 HISTORY OF UTERINE SCAR FROM PREVIOUS SURGERY: Chronic | ICD-10-CM

## 2024-07-23 DIAGNOSIS — Z92.21 PERSONAL HISTORY OF ANTINEOPLASTIC CHEMOTHERAPY: Chronic | ICD-10-CM

## 2024-07-29 ENCOUNTER — APPOINTMENT (OUTPATIENT)
Dept: HEMATOLOGY ONCOLOGY | Facility: CLINIC | Age: 79
End: 2024-07-29
Payer: MEDICARE

## 2024-07-29 VITALS
HEIGHT: 65 IN | RESPIRATION RATE: 16 BRPM | OXYGEN SATURATION: 95 % | HEART RATE: 77 BPM | SYSTOLIC BLOOD PRESSURE: 138 MMHG | BODY MASS INDEX: 31.82 KG/M2 | DIASTOLIC BLOOD PRESSURE: 82 MMHG | WEIGHT: 190.99 LBS | TEMPERATURE: 98 F

## 2024-07-29 DIAGNOSIS — C50.919 MALIGNANT NEOPLASM OF UNSPECIFIED SITE OF UNSPECIFIED FEMALE BREAST: ICD-10-CM

## 2024-07-29 PROCEDURE — G2211 COMPLEX E/M VISIT ADD ON: CPT

## 2024-07-29 PROCEDURE — 99214 OFFICE O/P EST MOD 30 MIN: CPT

## 2024-07-29 NOTE — REVIEW OF SYSTEMS
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

## 2024-07-29 NOTE — HISTORY OF PRESENT ILLNESS
[Disease: _____________________] : Disease: [unfilled] [T: ___] : T[unfilled] [N: ___] : N[unfilled] [AJCC Stage: ____] : AJCC Stage: [unfilled] [de-identified] : Patient was seen by her PMD who sent her for a screening mammogram (Last mammogram )  after she palpated a mass on the R side of her chest 2018. She had been having body aches/soreness and feeling general malaise since November, despite a course of antibiotics (just after flu shot administered). Also some R shoulder/arm pains. She has no prior history of abnormal breast imaging, breast biopsies, or surgeries.\par  \par  18 Mammogram screenin.7 cm irregular spiculated mass upper outer right breast 8 cm FN with associated architectural distortion and several associated microcalcifications. Partially included enlarged right axillary lymph nodes. BIRADS 0\par  \par  18 bilateral breast US: R breast 10:00 7 cm FN 2.6 cm irregular hypoechoic mass (biopsy recommended). Inferior right axillary lymph node with focal area of eccentric cortical thickening (recommend US guided biopsy). 9-10 o'clock 3 cm from the nipple and 9-10 o'clock 6 cm FN hypoechoic nodules located adjacent to the dominant mass felt to likely represent satellite lesions.\par  \par  1/3/19 US guided core biopsy R breast 10:00 7 cm FN: Invasive moderately differentiated ductal carcinoma with apocrine cytology and desmoplastic stroma. Michael 7/. 1.4cm involved, DCIS cribiform pattern with high grade nuclear atypia and apocrine cytology very focally present. (coil shaped clip) R axillary lymph node: metastatic ductal carcinoma with apocrine cytology involving a lymph node (hydromark marking clip) ER 0% NE 0% HER2 IHC 0 negative\par  \par  She saw Dr. Demetrio Robertson who recommended medical oncology consultation for neoadjuvant chemotherapy. \par  \par  She noted on initial visit with me some occasional body aches still and fatigue over the last month. She notes an intermittent tongue sensation - like a scald from food (though she does not recall an episode of this). Symptoms intermittent for months. Otherwise she feels well. She is active at baseline, uses stationary bike at home most days for exercise. \par  \par  19 PET/CT: Superolateral R breast and R axillary lymph nodes FDG-avid. S/p left nephrectomy. Few subcm b/l pulmonary nodules are nonspecific. Please correlate clinically for infection and follow up with CT chest in 1 month.  MRI Brain w/wo contrast 19: L maxillary polyp v. retention cyst. Non enhancing area of abnormal T2 prolongation involving left thalamic region, nonspecific. Stable on repeat 2019. Following with Dr. Jacobs.  CT Chest 19: No interval changes since 19. Stable 3.3 x 2.1 cm UOQ R breast, 2 x 1.2 cm R axillary lymph node. Stable 4 mm and smaller scattered indeterminant pulmonary nodules.\par  \par  She started neoadjuvant chemotherapy with AC on 19. Taxol #1 3/27.\par  Taxol #2 with grade 3 infusion reaction, vasculitic rash development. Taxol reattempt with similar. Taxotere attempt  with chest discomfort, acute worsening of rash/itching, severe back pain radiating into the chest, a few minutes into infusion. \par  Breast US performed with response to therapy noted.\par  Also c/b steroid induced hyperglycemia (A1C 8.0) - initiated home glucose monitoring and PMD follow up.\par  \par  CMF #1 with neulasta onpro started 19 without event; 4 cycles completed.\par  \par  b/l mastectomy 19 (Dr. Robertson): R breast: IDC, mod diff, 5.1mm (residual disease ranging in size from 1-2mm in tumor bed, approx 10-20% viable cells in 1.9x1.5x1.0cm tumor bed post-neoadjuvant treatment), DCIS grade 2-3 (4mm, 5/14 blocks), 2/4 sentinel nodes with ITCs on permanent section only. Margins negative\par  [id number](i+)\par  ER-NE-HER2 IHC 0 negative, PDL <1%.\par  \par  She completed post-mastectomy RT to the right braest c/b radiation dermatitis.\par  \par  She started adjuvant xeloda for residual disease (2000mg PO BID 2 weeks on, 1 week off) in Oct 2019. C1 xeloda with skin cracking, q2 diarrhea, decreased dose, completed nearly 7 cycles, held in light of COVID19 pandemic with high risk given close + contacts and per patient's request. Xeloda completed 2020. \par  \par  No family history of breast or ovarian cancer. [de-identified] : ER-[place]-HER2- [de-identified] : 7/29/24 Patient returns today to rule out progression or recurrence of breast cancer; On active surveillance Denies any chest wall masses or skin change - patient is very unhappy with results of her mastectomy surgery - had previously considered Plastics but decliens Having excess muscle pains due to cholesterol medication and BP meds  Patient denies any SOB, CP, abdominal pain, bone pain, headache, or unexplained weight loss Notes chest wall tightness since surgery, has declined PT; not causing difficulty with ADLS Not following with surgeon Dr. Robertson any longer Recent labs reviewed from 7/2024; rise in HgA1c, elevated Creat 1.56 - following with PMD  HEALTH MAINTENANCE PMD Lance Lafleur, PMD up to date, last seen 7/2024 mgt of DM, sees q3mos. CARDS Dr. Lorrie Yadav; Echo has been normal; following every 6 months DEXA - She never had one. Not interested in it, discussed previously  GI Colonoscopy - Had one many years prior but does not remember when it was done and how it was, reportedly no polyps. Stool occult blood screening 6/2021 with PMD negative, advised follow up with Dr. Lafleur; patient also notes she had cologard in 2023 July  BCC of skin removed, Dr. 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 Hale. GYN Pap smear - s/p CASSIE for fibroid uterus in 1985, declines further follow up RENAL Single kidney (kidney donor to brother in 1982) - baseline Cr 1.2-1.3 though patient notes no prior renal issues NEURO - Dr Jacobs now transitioned to Dr. Gustafson ; MR Head, 8/3/20- Re-demonstration 1.7 x 2.9 x 1.7 cm AP, TR, CC left thalamic pulvinar enlargement with T2, FLAIR hyperintensity, no rCBV or rCBF increase, differential includes low-grade neoplasm or atypical hamartoma, without interval change from prior.  Unchanged mild volume loss, microvascular disease, no new restricted diffusion, hemorrhage or midline shift. Last follow up with Dr. Jacobs 8/23/21, rec FU in 1 year with MRI brain. MRI brain 8/22 stable. - f/up scheduled with Dr. Nagi Gustafson in 9/2023 Single, lives with her son Richard. She has a daughter as well who works at [employer]. She is a retired RN. She notes she has exercising on her bike.  Walks a lot; feels she had a lot of muscle aches and worsening neuropathy of the feet after COVID vaccinations; using Vicks topically with improvement Had COVID exposure, asx COVID infection.  tested covid ab positive 4/20 s/p COVID vaccine and COVID booster in 8/2021; declines further COVID vaccination retired RN  [90: Able to carry normal activity; minor signs or symptoms of disease.] : 90: Able to carry normal activity; minor signs or symptoms of disease.  [ECOG Performance Status: 1 - Restricted in physically strenuous activity but ambulatory and able to carry out work of a light or sedentary nature] : Performance Status: 1 - Restricted in physically strenuous activity but ambulatory and able to carry out work of a light or sedentary nature, e.g., light house work, office work

## 2024-07-29 NOTE — PHYSICAL EXAM
[Fully active, able to carry on all pre-disease performance without restriction] : Status 0 - Fully active, able to carry on all pre-disease performance without restriction [Normal] : affect appropriate [de-identified] : b/l mastectomies with skin outpouching laterally, no palpable masses or adenopathy,  RT related skin changes noted right chest wall no erythema [de-identified] : ROM RUE at 160 degrees [de-identified] : many moles over skin UE/LEs

## 2024-07-29 NOTE — ASSESSMENT
[FreeTextEntry1] :  79 y/o woman with history of HTN on multiple medications, kidney donor/CKD, ER-[place]-HER2-, lymph-node positive right breast cancer anatomic stage IIB, AJCC 8th edition clinical prognostic stage IIIB, diagnosed in 2019. Initially was on neoadjuvant chemotherapy (dose-dense AC-->T) with course c/b neuropathy and a new rash after taxol, infusion reactions to both taxol and taxotere very quickly into infusion initiation. CT chest for cough 4/2019 with stable 3mm pulm nodules, decreased size of breast mass and axillary LN; US 4/17 with decrease in size of breast mass and axillary LN. She completed 4 cycles of CMF with OnPro q2 weeks and on  7/11/19 b/l mastectomy with residual disease, negative margins (T1bN0(i+)Mx). S/p adjuvant radiation and started on adjuvant Xeloda (2000mg PO BID, 2 weeks on, 1 week off) completed in 1/2020  -on active surveillance - discussed postmastectomy PT for tightness of chest wall - patient declines at this time; declines additional evaluation by plastics -clinically JOSE DANIEL x 5.5 years -not following with breast surgeon any longer -reviewed recent labs from 7/2024; following with PMD - continued surveillance brain imaging in 9/2024 and follow-up with neuro-onc Dr. 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

## 2024-08-07 ENCOUNTER — APPOINTMENT (OUTPATIENT)
Dept: INTERNAL MEDICINE | Facility: CLINIC | Age: 79
End: 2024-08-07

## 2024-09-03 ENCOUNTER — APPOINTMENT (OUTPATIENT)
Dept: MRI IMAGING | Facility: IMAGING CENTER | Age: 79
End: 2024-09-03
Payer: MEDICARE

## 2024-09-03 ENCOUNTER — OUTPATIENT (OUTPATIENT)
Dept: OUTPATIENT SERVICES | Facility: HOSPITAL | Age: 79
LOS: 1 days | End: 2024-09-03
Payer: MEDICARE

## 2024-09-03 ENCOUNTER — APPOINTMENT (OUTPATIENT)
Dept: NEUROLOGY | Facility: CLINIC | Age: 79
End: 2024-09-03
Payer: MEDICARE

## 2024-09-03 VITALS
DIASTOLIC BLOOD PRESSURE: 88 MMHG | RESPIRATION RATE: 16 BRPM | HEART RATE: 75 BPM | WEIGHT: 190 LBS | BODY MASS INDEX: 31.65 KG/M2 | HEIGHT: 65 IN | TEMPERATURE: 98.4 F | SYSTOLIC BLOOD PRESSURE: 139 MMHG | OXYGEN SATURATION: 97 %

## 2024-09-03 DIAGNOSIS — Z98.891 HISTORY OF UTERINE SCAR FROM PREVIOUS SURGERY: Chronic | ICD-10-CM

## 2024-09-03 DIAGNOSIS — Z00.8 ENCOUNTER FOR OTHER GENERAL EXAMINATION: ICD-10-CM

## 2024-09-03 DIAGNOSIS — Z90.5 ACQUIRED ABSENCE OF KIDNEY: Chronic | ICD-10-CM

## 2024-09-03 DIAGNOSIS — Z90.711 ACQUIRED ABSENCE OF UTERUS WITH REMAINING CERVICAL STUMP: Chronic | ICD-10-CM

## 2024-09-03 DIAGNOSIS — Z92.21 PERSONAL HISTORY OF ANTINEOPLASTIC CHEMOTHERAPY: Chronic | ICD-10-CM

## 2024-09-03 DIAGNOSIS — R90.89 OTHER ABNORMAL FINDINGS ON DIAGNOSTIC IMAGING OF CENTRAL NERVOUS SYSTEM: ICD-10-CM

## 2024-09-03 PROCEDURE — A9585: CPT

## 2024-09-03 PROCEDURE — 99214 OFFICE O/P EST MOD 30 MIN: CPT

## 2024-09-03 PROCEDURE — 70553 MRI BRAIN STEM W/O & W/DYE: CPT

## 2024-09-03 PROCEDURE — 70553 MRI BRAIN STEM W/O & W/DYE: CPT | Mod: 26

## 2024-09-03 NOTE — HISTORY OF PRESENT ILLNESS
[FreeTextEntry1] : NEURO-ONCOLOGY  This 78 year old right handed woman is seen in follow up for evaluation and management of abnormal brain MRI  She has TN breast cancer.  She was treated with nACT (poorly tolerated), then CMF, followed by BM and RT.  She received 1 year of adjuvant xeloda, done in 2020. She now follows with Dr. Gregorio. For nonspecific gustatory sensations and dysgeusia she had an MRI brain showing a left thalamic lesion, stable over time.   Trial of seizure meds did not abort events, but they eventually subsided spontaneously.    INTERVAL HISTORY:  No new neurologic complaints.   MRI brain stable without enhancement in thalamic lesion.  Discussed with neuroradiology.    PMH: DM.  HTN.  breast cancer PSH:  BM.  CASSIE.  donor nephrectomy SHX:  retired RN. nonsmoker. nondrinker FHX:  denies brain tumors

## 2024-09-03 NOTE — DATA REVIEWED
[de-identified] : reviewed serial MRI brain 9/24, 9/23, 8/22 and prior from 2021 and 1/2019, without change, enhancement or hyperperfusion of left thalamic lesion

## 2024-10-24 ENCOUNTER — APPOINTMENT (OUTPATIENT)
Dept: INTERNAL MEDICINE | Facility: CLINIC | Age: 79
End: 2024-10-24

## 2024-10-29 ENCOUNTER — APPOINTMENT (OUTPATIENT)
Dept: INTERNAL MEDICINE | Facility: CLINIC | Age: 79
End: 2024-10-29

## 2024-10-29 VITALS
WEIGHT: 192 LBS | SYSTOLIC BLOOD PRESSURE: 160 MMHG | OXYGEN SATURATION: 97 % | RESPIRATION RATE: 16 BRPM | DIASTOLIC BLOOD PRESSURE: 92 MMHG | HEART RATE: 78 BPM | HEIGHT: 65 IN | BODY MASS INDEX: 31.99 KG/M2

## 2024-10-29 DIAGNOSIS — E66.9 OBESITY, UNSPECIFIED: ICD-10-CM

## 2024-10-29 DIAGNOSIS — H57.89 OTHER SPECIFIED DISORDERS OF EYE AND ADNEXA: ICD-10-CM

## 2024-10-29 DIAGNOSIS — E78.5 HYPERLIPIDEMIA, UNSPECIFIED: ICD-10-CM

## 2024-10-29 DIAGNOSIS — R90.89 OTHER ABNORMAL FINDINGS ON DIAGNOSTIC IMAGING OF CENTRAL NERVOUS SYSTEM: ICD-10-CM

## 2024-10-29 DIAGNOSIS — R79.89 OTHER SPECIFIED ABNORMAL FINDINGS OF BLOOD CHEMISTRY: ICD-10-CM

## 2024-10-29 DIAGNOSIS — Z85.89 PERSONAL HISTORY OF MALIGNANT NEOPLASM OF OTHER ORGANS AND SYSTEMS: ICD-10-CM

## 2024-10-29 DIAGNOSIS — C50.919 MALIGNANT NEOPLASM OF UNSPECIFIED SITE OF UNSPECIFIED FEMALE BREAST: ICD-10-CM

## 2024-10-29 DIAGNOSIS — R10.13 EPIGASTRIC PAIN: ICD-10-CM

## 2024-10-29 DIAGNOSIS — G40.89 OTHER SEIZURES: ICD-10-CM

## 2024-10-29 DIAGNOSIS — E11.9 TYPE 2 DIABETES MELLITUS W/OUT COMPLICATIONS: ICD-10-CM

## 2024-10-29 DIAGNOSIS — Z23 ENCOUNTER FOR IMMUNIZATION: ICD-10-CM

## 2024-10-29 DIAGNOSIS — I10 ESSENTIAL (PRIMARY) HYPERTENSION: ICD-10-CM

## 2024-10-29 DIAGNOSIS — M54.9 DORSALGIA, UNSPECIFIED: ICD-10-CM

## 2024-10-29 DIAGNOSIS — R25.2 CRAMP AND SPASM: ICD-10-CM

## 2024-10-29 DIAGNOSIS — E83.42 HYPOMAGNESEMIA: ICD-10-CM

## 2024-10-29 DIAGNOSIS — Z52.4 KIDNEY DONOR: ICD-10-CM

## 2024-10-29 PROCEDURE — G0008: CPT

## 2024-10-29 PROCEDURE — 90662 IIV NO PRSV INCREASED AG IM: CPT

## 2024-10-29 PROCEDURE — 36415 COLL VENOUS BLD VENIPUNCTURE: CPT

## 2024-10-29 PROCEDURE — 99214 OFFICE O/P EST MOD 30 MIN: CPT | Mod: 25

## 2024-10-29 RX ORDER — AZELASTINE HYDROCHLORIDE 0.5 MG/ML
0.05 SOLUTION/ DROPS OPHTHALMIC TWICE DAILY
Qty: 1 | Refills: 2 | Status: ACTIVE | COMMUNITY
Start: 2024-10-29 | End: 1900-01-01

## 2024-10-29 RX ORDER — CELECOXIB 100 MG/1
100 CAPSULE ORAL TWICE DAILY
Qty: 90 | Refills: 0 | Status: ACTIVE | COMMUNITY
Start: 2024-10-29 | End: 1900-01-01

## 2024-10-30 LAB
25(OH)D3 SERPL-MCNC: 58.5 NG/ML
BASOPHILS # BLD AUTO: 0.04 K/UL
BASOPHILS NFR BLD AUTO: 0.5 %
CHOLEST SERPL-MCNC: 202 MG/DL
EOSINOPHIL # BLD AUTO: 0.28 K/UL
EOSINOPHIL NFR BLD AUTO: 3.6 %
ESTIMATED AVERAGE GLUCOSE: 177 MG/DL
HBA1C MFR BLD HPLC: 7.8 %
HCT VFR BLD CALC: 37.7 %
HDLC SERPL-MCNC: 65 MG/DL
HGB BLD-MCNC: 12.1 G/DL
IMM GRANULOCYTES NFR BLD AUTO: 0.4 %
LDLC SERPL CALC-MCNC: 115 MG/DL
LYMPHOCYTES # BLD AUTO: 2.04 K/UL
LYMPHOCYTES NFR BLD AUTO: 26.1 %
MAGNESIUM SERPL-MCNC: 1.5 MG/DL
MAN DIFF?: NORMAL
MCHC RBC-ENTMCNC: 29.5 PG
MCHC RBC-ENTMCNC: 32.1 G/DL
MCV RBC AUTO: 92 FL
MONOCYTES # BLD AUTO: 0.43 K/UL
MONOCYTES NFR BLD AUTO: 5.5 %
NEUTROPHILS # BLD AUTO: 4.99 K/UL
NEUTROPHILS NFR BLD AUTO: 63.9 %
NONHDLC SERPL-MCNC: 137 MG/DL
PLATELET # BLD AUTO: 238 K/UL
RBC # BLD: 4.1 M/UL
RBC # FLD: 13.3 %
TRIGL SERPL-MCNC: 125 MG/DL
WBC # FLD AUTO: 7.81 K/UL

## 2024-11-06 ENCOUNTER — RX RENEWAL (OUTPATIENT)
Age: 79
End: 2024-11-06

## 2024-11-13 ENCOUNTER — OUTPATIENT (OUTPATIENT)
Dept: OUTPATIENT SERVICES | Facility: HOSPITAL | Age: 79
LOS: 1 days | End: 2024-11-13
Payer: MEDICARE

## 2024-11-13 ENCOUNTER — APPOINTMENT (OUTPATIENT)
Dept: CV DIAGNOSITCS | Facility: HOSPITAL | Age: 79
End: 2024-11-13

## 2024-11-13 ENCOUNTER — NON-APPOINTMENT (OUTPATIENT)
Age: 79
End: 2024-11-13

## 2024-11-13 ENCOUNTER — APPOINTMENT (OUTPATIENT)
Dept: CARDIOLOGY | Facility: CLINIC | Age: 79
End: 2024-11-13

## 2024-11-13 ENCOUNTER — RESULT REVIEW (OUTPATIENT)
Age: 79
End: 2024-11-13

## 2024-11-13 VITALS
HEART RATE: 82 BPM | BODY MASS INDEX: 31.99 KG/M2 | OXYGEN SATURATION: 97 % | SYSTOLIC BLOOD PRESSURE: 157 MMHG | WEIGHT: 192 LBS | DIASTOLIC BLOOD PRESSURE: 92 MMHG | HEIGHT: 65 IN

## 2024-11-13 VITALS — DIASTOLIC BLOOD PRESSURE: 84 MMHG | SYSTOLIC BLOOD PRESSURE: 136 MMHG

## 2024-11-13 DIAGNOSIS — Z92.21 PERSONAL HISTORY OF ANTINEOPLASTIC CHEMOTHERAPY: Chronic | ICD-10-CM

## 2024-11-13 DIAGNOSIS — Z90.5 ACQUIRED ABSENCE OF KIDNEY: Chronic | ICD-10-CM

## 2024-11-13 DIAGNOSIS — Z98.891 HISTORY OF UTERINE SCAR FROM PREVIOUS SURGERY: Chronic | ICD-10-CM

## 2024-11-13 DIAGNOSIS — I25.10 ATHEROSCLEROTIC HEART DISEASE OF NATIVE CORONARY ARTERY WITHOUT ANGINA PECTORIS: ICD-10-CM

## 2024-11-13 PROCEDURE — 93356 MYOCRD STRAIN IMG SPCKL TRCK: CPT

## 2024-11-13 PROCEDURE — 99215 OFFICE O/P EST HI 40 MIN: CPT | Mod: 25

## 2024-11-13 PROCEDURE — 93306 TTE W/DOPPLER COMPLETE: CPT

## 2024-11-13 PROCEDURE — 93306 TTE W/DOPPLER COMPLETE: CPT | Mod: 26

## 2024-11-13 PROCEDURE — 93000 ELECTROCARDIOGRAM COMPLETE: CPT

## 2024-11-25 ENCOUNTER — NON-APPOINTMENT (OUTPATIENT)
Age: 79
End: 2024-11-25

## 2024-11-27 ENCOUNTER — APPOINTMENT (OUTPATIENT)
Dept: DERMATOLOGY | Facility: CLINIC | Age: 79
End: 2024-11-27

## 2024-12-04 ENCOUNTER — RX RENEWAL (OUTPATIENT)
Age: 79
End: 2024-12-04

## 2024-12-16 ENCOUNTER — RX RENEWAL (OUTPATIENT)
Age: 79
End: 2024-12-16

## 2025-01-03 ENCOUNTER — APPOINTMENT (OUTPATIENT)
Dept: DERMATOLOGY | Facility: CLINIC | Age: 80
End: 2025-01-03

## 2025-01-08 ENCOUNTER — APPOINTMENT (OUTPATIENT)
Dept: DERMATOLOGY | Facility: CLINIC | Age: 80
End: 2025-01-08
Payer: MEDICARE

## 2025-01-08 VITALS — HEIGHT: 65 IN | WEIGHT: 192 LBS | BODY MASS INDEX: 31.99 KG/M2

## 2025-01-08 DIAGNOSIS — L81.4 OTHER MELANIN HYPERPIGMENTATION: ICD-10-CM

## 2025-01-08 DIAGNOSIS — L57.0 ACTINIC KERATOSIS: ICD-10-CM

## 2025-01-08 DIAGNOSIS — Z85.89 PERSONAL HISTORY OF MALIGNANT NEOPLASM OF OTHER ORGANS AND SYSTEMS: ICD-10-CM

## 2025-01-08 DIAGNOSIS — L82.0 INFLAMED SEBORRHEIC KERATOSIS: ICD-10-CM

## 2025-01-08 DIAGNOSIS — Z12.83 ENCOUNTER FOR SCREENING FOR MALIGNANT NEOPLASM OF SKIN: ICD-10-CM

## 2025-01-08 PROCEDURE — 17000 DESTRUCT PREMALG LESION: CPT | Mod: 59

## 2025-01-08 PROCEDURE — 17110 DESTRUCTION B9 LES UP TO 14: CPT

## 2025-01-08 PROCEDURE — 17003 DESTRUCT PREMALG LES 2-14: CPT | Mod: 59

## 2025-01-08 PROCEDURE — 99213 OFFICE O/P EST LOW 20 MIN: CPT | Mod: 25

## 2025-01-17 ENCOUNTER — OUTPATIENT (OUTPATIENT)
Dept: OUTPATIENT SERVICES | Facility: HOSPITAL | Age: 80
LOS: 1 days | Discharge: HOME | End: 2025-01-17

## 2025-01-17 DIAGNOSIS — Z90.711 ACQUIRED ABSENCE OF UTERUS WITH REMAINING CERVICAL STUMP: Chronic | ICD-10-CM

## 2025-01-17 DIAGNOSIS — Z98.891 HISTORY OF UTERINE SCAR FROM PREVIOUS SURGERY: Chronic | ICD-10-CM

## 2025-01-17 DIAGNOSIS — Z90.5 ACQUIRED ABSENCE OF KIDNEY: Chronic | ICD-10-CM

## 2025-01-17 DIAGNOSIS — Z92.21 PERSONAL HISTORY OF ANTINEOPLASTIC CHEMOTHERAPY: Chronic | ICD-10-CM

## 2025-01-17 DIAGNOSIS — C50.919 MALIGNANT NEOPLASM OF UNSPECIFIED SITE OF UNSPECIFIED FEMALE BREAST: ICD-10-CM

## 2025-01-21 ENCOUNTER — APPOINTMENT (OUTPATIENT)
Dept: HEMATOLOGY ONCOLOGY | Facility: CLINIC | Age: 80
End: 2025-01-21

## 2025-01-30 ENCOUNTER — APPOINTMENT (OUTPATIENT)
Dept: INTERNAL MEDICINE | Facility: CLINIC | Age: 80
End: 2025-01-30

## 2025-01-30 VITALS
TEMPERATURE: 97.6 F | SYSTOLIC BLOOD PRESSURE: 128 MMHG | OXYGEN SATURATION: 98 % | BODY MASS INDEX: 31.82 KG/M2 | DIASTOLIC BLOOD PRESSURE: 68 MMHG | RESPIRATION RATE: 16 BRPM | WEIGHT: 191 LBS | HEIGHT: 65 IN | HEART RATE: 76 BPM

## 2025-01-30 DIAGNOSIS — Z23 ENCOUNTER FOR IMMUNIZATION: ICD-10-CM

## 2025-01-30 DIAGNOSIS — R90.89 OTHER ABNORMAL FINDINGS ON DIAGNOSTIC IMAGING OF CENTRAL NERVOUS SYSTEM: ICD-10-CM

## 2025-01-30 DIAGNOSIS — E78.5 HYPERLIPIDEMIA, UNSPECIFIED: ICD-10-CM

## 2025-01-30 DIAGNOSIS — G40.89 OTHER SEIZURES: ICD-10-CM

## 2025-01-30 DIAGNOSIS — E11.9 TYPE 2 DIABETES MELLITUS W/OUT COMPLICATIONS: ICD-10-CM

## 2025-01-30 DIAGNOSIS — C50.919 MALIGNANT NEOPLASM OF UNSPECIFIED SITE OF UNSPECIFIED FEMALE BREAST: ICD-10-CM

## 2025-01-30 DIAGNOSIS — Z13.31 ENCOUNTER FOR SCREENING FOR DEPRESSION: ICD-10-CM

## 2025-01-30 DIAGNOSIS — M54.9 DORSALGIA, UNSPECIFIED: ICD-10-CM

## 2025-01-30 DIAGNOSIS — Z52.4 KIDNEY DONOR: ICD-10-CM

## 2025-01-30 DIAGNOSIS — R10.13 EPIGASTRIC PAIN: ICD-10-CM

## 2025-01-30 DIAGNOSIS — E66.9 OBESITY, UNSPECIFIED: ICD-10-CM

## 2025-01-30 DIAGNOSIS — R79.89 OTHER SPECIFIED ABNORMAL FINDINGS OF BLOOD CHEMISTRY: ICD-10-CM

## 2025-01-30 DIAGNOSIS — I10 ESSENTIAL (PRIMARY) HYPERTENSION: ICD-10-CM

## 2025-01-30 DIAGNOSIS — R25.2 CRAMP AND SPASM: ICD-10-CM

## 2025-01-30 DIAGNOSIS — E83.42 HYPOMAGNESEMIA: ICD-10-CM

## 2025-01-30 PROCEDURE — 90677 PCV20 VACCINE IM: CPT

## 2025-01-30 PROCEDURE — 99214 OFFICE O/P EST MOD 30 MIN: CPT | Mod: 25

## 2025-01-30 PROCEDURE — 36415 COLL VENOUS BLD VENIPUNCTURE: CPT

## 2025-01-30 PROCEDURE — G2211 COMPLEX E/M VISIT ADD ON: CPT

## 2025-01-30 PROCEDURE — G0009: CPT

## 2025-01-30 RX ORDER — MAGNESIUM OXIDE 241.3 MG/1000MG
400 TABLET ORAL EVERY OTHER DAY
Qty: 15 | Refills: 3 | Status: ACTIVE | COMMUNITY
Start: 2025-01-30 | End: 1900-01-01

## 2025-01-31 LAB
25(OH)D3 SERPL-MCNC: 56 NG/ML
ALBUMIN SERPL ELPH-MCNC: 4.3 G/DL
ALP BLD-CCNC: 67 U/L
ALT SERPL-CCNC: 28 U/L
ANION GAP SERPL CALC-SCNC: 11 MMOL/L
AST SERPL-CCNC: 19 U/L
BASOPHILS # BLD AUTO: 0.03 K/UL
BASOPHILS NFR BLD AUTO: 0.5 %
BILIRUB SERPL-MCNC: 0.3 MG/DL
BUN SERPL-MCNC: 24 MG/DL
CALCIUM SERPL-MCNC: 9.7 MG/DL
CHLORIDE SERPL-SCNC: 100 MMOL/L
CHOLEST SERPL-MCNC: 192 MG/DL
CK SERPL-CCNC: 114 U/L
CO2 SERPL-SCNC: 27 MMOL/L
CREAT SERPL-MCNC: 1.36 MG/DL
EGFR: 40 ML/MIN/1.73M2
EOSINOPHIL # BLD AUTO: 0.19 K/UL
EOSINOPHIL NFR BLD AUTO: 2.9 %
ESTIMATED AVERAGE GLUCOSE: 169 MG/DL
GLUCOSE SERPL-MCNC: 261 MG/DL
HBA1C MFR BLD HPLC: 7.5 %
HCT VFR BLD CALC: 38.5 %
HDLC SERPL-MCNC: 64 MG/DL
HGB BLD-MCNC: 12.4 G/DL
IMM GRANULOCYTES NFR BLD AUTO: 0.3 %
LDLC SERPL CALC-MCNC: 105 MG/DL
LYMPHOCYTES # BLD AUTO: 1.32 K/UL
LYMPHOCYTES NFR BLD AUTO: 20.1 %
MAGNESIUM SERPL-MCNC: 1.7 MG/DL
MAN DIFF?: NORMAL
MCHC RBC-ENTMCNC: 29.9 PG
MCHC RBC-ENTMCNC: 32.2 G/DL
MCV RBC AUTO: 92.8 FL
MONOCYTES # BLD AUTO: 0.34 K/UL
MONOCYTES NFR BLD AUTO: 5.2 %
NEUTROPHILS # BLD AUTO: 4.67 K/UL
NEUTROPHILS NFR BLD AUTO: 71 %
NONHDLC SERPL-MCNC: 128 MG/DL
PLATELET # BLD AUTO: 273 K/UL
POTASSIUM SERPL-SCNC: 4 MMOL/L
PROT SERPL-MCNC: 6.5 G/DL
RBC # BLD: 4.15 M/UL
RBC # FLD: 13.8 %
SODIUM SERPL-SCNC: 139 MMOL/L
TRIGL SERPL-MCNC: 134 MG/DL
WBC # FLD AUTO: 6.57 K/UL

## 2025-04-07 ENCOUNTER — OUTPATIENT (OUTPATIENT)
Dept: OUTPATIENT SERVICES | Facility: HOSPITAL | Age: 80
LOS: 1 days | Discharge: HOME | End: 2025-04-07

## 2025-04-07 DIAGNOSIS — Z92.21 PERSONAL HISTORY OF ANTINEOPLASTIC CHEMOTHERAPY: Chronic | ICD-10-CM

## 2025-04-07 DIAGNOSIS — Z90.5 ACQUIRED ABSENCE OF KIDNEY: Chronic | ICD-10-CM

## 2025-04-07 DIAGNOSIS — Z90.711 ACQUIRED ABSENCE OF UTERUS WITH REMAINING CERVICAL STUMP: Chronic | ICD-10-CM

## 2025-04-07 DIAGNOSIS — Z98.891 HISTORY OF UTERINE SCAR FROM PREVIOUS SURGERY: Chronic | ICD-10-CM

## 2025-04-10 ENCOUNTER — APPOINTMENT (OUTPATIENT)
Dept: HEMATOLOGY ONCOLOGY | Facility: CLINIC | Age: 80
End: 2025-04-10
Payer: MEDICARE

## 2025-04-10 VITALS
DIASTOLIC BLOOD PRESSURE: 75 MMHG | TEMPERATURE: 97 F | RESPIRATION RATE: 16 BRPM | BODY MASS INDEX: 31.95 KG/M2 | SYSTOLIC BLOOD PRESSURE: 135 MMHG | HEART RATE: 83 BPM | WEIGHT: 192.02 LBS | OXYGEN SATURATION: 97 %

## 2025-04-10 DIAGNOSIS — C50.919 MALIGNANT NEOPLASM OF UNSPECIFIED SITE OF UNSPECIFIED FEMALE BREAST: ICD-10-CM

## 2025-04-10 PROCEDURE — 99214 OFFICE O/P EST MOD 30 MIN: CPT

## 2025-04-10 PROCEDURE — G2211 COMPLEX E/M VISIT ADD ON: CPT

## 2025-04-13 NOTE — PROVIDER CONTACT NOTE (OTHER) - SITUATION
Patient complaining of distended abdomen
pt states she does not want to take insulin.  along with ecchymotic over her right breast.
[facility].

## 2025-04-23 ENCOUNTER — APPOINTMENT (OUTPATIENT)
Dept: DERMATOLOGY | Facility: CLINIC | Age: 80
End: 2025-04-23
Payer: MEDICARE

## 2025-04-23 PROCEDURE — 17110 DESTRUCTION B9 LES UP TO 14: CPT | Mod: 59

## 2025-04-23 PROCEDURE — 17000 DESTRUCT PREMALG LESION: CPT | Mod: 59

## 2025-04-23 PROCEDURE — 99214 OFFICE O/P EST MOD 30 MIN: CPT | Mod: 25

## 2025-05-12 ENCOUNTER — LABORATORY RESULT (OUTPATIENT)
Age: 80
End: 2025-05-12

## 2025-05-12 ENCOUNTER — APPOINTMENT (OUTPATIENT)
Dept: INTERNAL MEDICINE | Facility: CLINIC | Age: 80
End: 2025-05-12
Payer: MEDICARE

## 2025-05-12 VITALS
SYSTOLIC BLOOD PRESSURE: 118 MMHG | HEIGHT: 65 IN | BODY MASS INDEX: 31.99 KG/M2 | DIASTOLIC BLOOD PRESSURE: 72 MMHG | RESPIRATION RATE: 16 BRPM | HEART RATE: 76 BPM | WEIGHT: 192 LBS | OXYGEN SATURATION: 96 %

## 2025-05-12 DIAGNOSIS — J34.89 OTHER SPECIFIED DISORDERS OF NOSE AND NASAL SINUSES: ICD-10-CM

## 2025-05-12 DIAGNOSIS — E66.9 OBESITY, UNSPECIFIED: ICD-10-CM

## 2025-05-12 DIAGNOSIS — R79.89 OTHER SPECIFIED ABNORMAL FINDINGS OF BLOOD CHEMISTRY: ICD-10-CM

## 2025-05-12 DIAGNOSIS — E83.42 HYPOMAGNESEMIA: ICD-10-CM

## 2025-05-12 PROCEDURE — G2211 COMPLEX E/M VISIT ADD ON: CPT

## 2025-05-12 PROCEDURE — 36415 COLL VENOUS BLD VENIPUNCTURE: CPT

## 2025-05-12 PROCEDURE — 99214 OFFICE O/P EST MOD 30 MIN: CPT

## 2025-05-12 RX ORDER — AZELASTINE 137 UG/1
137 SPRAY, METERED NASAL
Qty: 1 | Refills: 1 | Status: ACTIVE | COMMUNITY
Start: 2025-05-12 | End: 1900-01-01

## 2025-05-13 LAB
ALBUMIN SERPL ELPH-MCNC: 4.5 G/DL
ALP BLD-CCNC: 69 U/L
ALT SERPL-CCNC: 24 U/L
ANION GAP SERPL CALC-SCNC: 15 MMOL/L
AST SERPL-CCNC: 19 U/L
BASOPHILS # BLD AUTO: 0.03 K/UL
BASOPHILS NFR BLD AUTO: 0.5 %
BILIRUB SERPL-MCNC: 0.2 MG/DL
BUN SERPL-MCNC: 35 MG/DL
CALCIUM SERPL-MCNC: 9.7 MG/DL
CHLORIDE SERPL-SCNC: 102 MMOL/L
CK SERPL-CCNC: 137 U/L
CO2 SERPL-SCNC: 24 MMOL/L
CREAT SERPL-MCNC: 1.4 MG/DL
EGFRCR SERPLBLD CKD-EPI 2021: 38 ML/MIN/1.73M2
EOSINOPHIL # BLD AUTO: 0.23 K/UL
EOSINOPHIL NFR BLD AUTO: 3.7 %
GLUCOSE SERPL-MCNC: 149 MG/DL
HCT VFR BLD CALC: 37.1 %
HGB BLD-MCNC: 12.2 G/DL
IMM GRANULOCYTES NFR BLD AUTO: 0.3 %
LYMPHOCYTES # BLD AUTO: 1.56 K/UL
LYMPHOCYTES NFR BLD AUTO: 24.8 %
MAGNESIUM SERPL-MCNC: 1.8 MG/DL
MAN DIFF?: NORMAL
MCHC RBC-ENTMCNC: 29.8 PG
MCHC RBC-ENTMCNC: 32.9 G/DL
MCV RBC AUTO: 90.7 FL
MONOCYTES # BLD AUTO: 0.43 K/UL
MONOCYTES NFR BLD AUTO: 6.8 %
NEUTROPHILS # BLD AUTO: 4.02 K/UL
NEUTROPHILS NFR BLD AUTO: 63.9 %
PLATELET # BLD AUTO: 234 K/UL
POTASSIUM SERPL-SCNC: 4.4 MMOL/L
PROT SERPL-MCNC: 6.9 G/DL
RBC # BLD: 4.09 M/UL
RBC # FLD: 13.5 %
SODIUM SERPL-SCNC: 140 MMOL/L
TSH SERPL-ACNC: 4.85 UIU/ML
WBC # FLD AUTO: 6.29 K/UL

## 2025-05-14 ENCOUNTER — NON-APPOINTMENT (OUTPATIENT)
Age: 80
End: 2025-05-14

## 2025-05-14 ENCOUNTER — APPOINTMENT (OUTPATIENT)
Dept: CARDIOLOGY | Facility: CLINIC | Age: 80
End: 2025-05-14
Payer: MEDICARE

## 2025-05-14 VITALS
HEIGHT: 65 IN | DIASTOLIC BLOOD PRESSURE: 73 MMHG | WEIGHT: 192 LBS | OXYGEN SATURATION: 96 % | BODY MASS INDEX: 31.99 KG/M2 | HEART RATE: 77 BPM | SYSTOLIC BLOOD PRESSURE: 106 MMHG

## 2025-05-14 DIAGNOSIS — E11.9 TYPE 2 DIABETES MELLITUS W/OUT COMPLICATIONS: ICD-10-CM

## 2025-05-14 DIAGNOSIS — R25.2 CRAMP AND SPASM: ICD-10-CM

## 2025-05-14 DIAGNOSIS — I10 ESSENTIAL (PRIMARY) HYPERTENSION: ICD-10-CM

## 2025-05-14 DIAGNOSIS — Z85.3 PERSONAL HISTORY OF MALIGNANT NEOPLASM OF BREAST: ICD-10-CM

## 2025-05-14 DIAGNOSIS — Z92.21 PERSONAL HISTORY OF ANTINEOPLASTIC CHEMOTHERAPY: ICD-10-CM

## 2025-05-14 DIAGNOSIS — E78.5 HYPERLIPIDEMIA, UNSPECIFIED: ICD-10-CM

## 2025-05-14 PROCEDURE — G2211 COMPLEX E/M VISIT ADD ON: CPT

## 2025-05-14 PROCEDURE — 93000 ELECTROCARDIOGRAM COMPLETE: CPT

## 2025-05-14 PROCEDURE — 99215 OFFICE O/P EST HI 40 MIN: CPT

## 2025-05-16 PROBLEM — Z85.3 HISTORY OF BREAST CANCER: Status: RESOLVED | Noted: 2025-05-16 | Resolved: 2025-05-16

## 2025-05-20 RX ORDER — METOPROLOL SUCCINATE 100 MG/1
100 TABLET, EXTENDED RELEASE ORAL DAILY
Qty: 90 | Refills: 1 | Status: ACTIVE | COMMUNITY
Start: 2025-05-20 | End: 1900-01-01

## 2025-06-11 ENCOUNTER — APPOINTMENT (OUTPATIENT)
Dept: DERMATOLOGY | Facility: CLINIC | Age: 80
End: 2025-06-11

## 2025-06-12 ENCOUNTER — APPOINTMENT (OUTPATIENT)
Dept: INTERNAL MEDICINE | Facility: CLINIC | Age: 80
End: 2025-06-12
Payer: MEDICARE

## 2025-06-12 ENCOUNTER — RX RENEWAL (OUTPATIENT)
Age: 80
End: 2025-06-12

## 2025-06-12 VITALS
SYSTOLIC BLOOD PRESSURE: 108 MMHG | OXYGEN SATURATION: 97 % | DIASTOLIC BLOOD PRESSURE: 76 MMHG | HEIGHT: 65 IN | BODY MASS INDEX: 31.82 KG/M2 | HEART RATE: 86 BPM | RESPIRATION RATE: 16 BRPM | WEIGHT: 191 LBS

## 2025-06-12 PROBLEM — R79.89 ELEVATED TSH: Status: ACTIVE | Noted: 2025-06-12

## 2025-06-12 PROCEDURE — 99214 OFFICE O/P EST MOD 30 MIN: CPT

## 2025-06-12 PROCEDURE — G2211 COMPLEX E/M VISIT ADD ON: CPT

## 2025-06-12 PROCEDURE — 36415 COLL VENOUS BLD VENIPUNCTURE: CPT

## 2025-06-13 LAB
25(OH)D3 SERPL-MCNC: 71.3 NG/ML
ALBUMIN SERPL ELPH-MCNC: 4.2 G/DL
ALP BLD-CCNC: 76 U/L
ALT SERPL-CCNC: 54 U/L
ANION GAP SERPL CALC-SCNC: 17 MMOL/L
AST SERPL-CCNC: 37 U/L
BASOPHILS # BLD AUTO: 0.05 K/UL
BASOPHILS NFR BLD AUTO: 0.7 %
BILIRUB SERPL-MCNC: 0.2 MG/DL
BUN SERPL-MCNC: 27 MG/DL
CALCIUM SERPL-MCNC: 10 MG/DL
CHLORIDE SERPL-SCNC: 103 MMOL/L
CHOLEST SERPL-MCNC: 258 MG/DL
CK SERPL-CCNC: 95 U/L
CO2 SERPL-SCNC: 21 MMOL/L
CREAT SERPL-MCNC: 1.33 MG/DL
CYSTATIN C SERPL-MCNC: 1.44 MG/L
EGFRCR SERPLBLD CKD-EPI 2021: 41 ML/MIN/1.73M2
EOSINOPHIL # BLD AUTO: 0.26 K/UL
EOSINOPHIL NFR BLD AUTO: 3.8 %
ESTIMATED AVERAGE GLUCOSE: 214 MG/DL
GFR/BSA.PRED SERPLBLD CYS-BASED-ARV: 41 ML/MIN/1.73M2
GLUCOSE SERPL-MCNC: 203 MG/DL
HBA1C MFR BLD HPLC: 9.1 %
HCT VFR BLD CALC: 40 %
HDLC SERPL-MCNC: 68 MG/DL
HGB BLD-MCNC: 12.6 G/DL
IMM GRANULOCYTES NFR BLD AUTO: 0.7 %
LDLC SERPL-MCNC: 164 MG/DL
LYMPHOCYTES # BLD AUTO: 2 K/UL
LYMPHOCYTES NFR BLD AUTO: 29.5 %
MAGNESIUM SERPL-MCNC: 1.5 MG/DL
MAN DIFF?: NORMAL
MCHC RBC-ENTMCNC: 29.1 PG
MCHC RBC-ENTMCNC: 31.5 G/DL
MCV RBC AUTO: 92.4 FL
MONOCYTES # BLD AUTO: 0.44 K/UL
MONOCYTES NFR BLD AUTO: 6.5 %
NEUTROPHILS # BLD AUTO: 3.98 K/UL
NEUTROPHILS NFR BLD AUTO: 58.8 %
NONHDLC SERPL-MCNC: 190 MG/DL
PHOSPHATE SERPL-MCNC: 4 MG/DL
PLATELET # BLD AUTO: 270 K/UL
POTASSIUM SERPL-SCNC: 4 MMOL/L
PROT SERPL-MCNC: 6.6 G/DL
RBC # BLD: 4.33 M/UL
RBC # FLD: 14.1 %
SODIUM SERPL-SCNC: 141 MMOL/L
T3 SERPL-MCNC: 93 NG/DL
T4 FREE SERPL-MCNC: 1.1 NG/DL
THYROGLOB AB SERPL-ACNC: 15.9 IU/ML
THYROPEROXIDASE AB SERPL IA-ACNC: <9 IU/ML
TRIGL SERPL-MCNC: 147 MG/DL
TSH SERPL-ACNC: 5.23 UIU/ML
WBC # FLD AUTO: 6.78 K/UL

## 2025-06-16 ENCOUNTER — APPOINTMENT (OUTPATIENT)
Dept: DERMATOLOGY | Facility: CLINIC | Age: 80
End: 2025-06-16
Payer: MEDICARE

## 2025-06-16 PROCEDURE — 11102 TANGNTL BX SKIN SINGLE LES: CPT | Mod: 59

## 2025-06-16 PROCEDURE — 17000 DESTRUCT PREMALG LESION: CPT | Mod: 59

## 2025-06-16 PROCEDURE — 99213 OFFICE O/P EST LOW 20 MIN: CPT | Mod: 25

## 2025-06-18 ENCOUNTER — RX RENEWAL (OUTPATIENT)
Age: 80
End: 2025-06-18

## 2025-06-20 LAB — DERMATOLOGY BIOPSY: NORMAL

## 2025-06-23 ENCOUNTER — NON-APPOINTMENT (OUTPATIENT)
Age: 80
End: 2025-06-23

## 2025-07-15 ENCOUNTER — NON-APPOINTMENT (OUTPATIENT)
Age: 80
End: 2025-07-15

## 2025-07-15 ENCOUNTER — APPOINTMENT (OUTPATIENT)
Dept: CARDIOLOGY | Facility: CLINIC | Age: 80
End: 2025-07-15
Payer: MEDICARE

## 2025-07-15 VITALS
SYSTOLIC BLOOD PRESSURE: 122 MMHG | HEIGHT: 65 IN | HEART RATE: 80 BPM | BODY MASS INDEX: 34.16 KG/M2 | RESPIRATION RATE: 21 BRPM | OXYGEN SATURATION: 98 % | DIASTOLIC BLOOD PRESSURE: 62 MMHG | WEIGHT: 205 LBS

## 2025-07-15 PROCEDURE — 93000 ELECTROCARDIOGRAM COMPLETE: CPT

## 2025-07-15 PROCEDURE — 99214 OFFICE O/P EST MOD 30 MIN: CPT

## 2025-07-15 PROCEDURE — G2211 COMPLEX E/M VISIT ADD ON: CPT

## 2025-07-15 RX ORDER — ATORVASTATIN CALCIUM 20 MG/1
20 TABLET, FILM COATED ORAL
Qty: 90 | Refills: 1 | Status: ACTIVE | COMMUNITY
Start: 2025-07-15 | End: 1900-01-01

## 2025-07-18 ENCOUNTER — NON-APPOINTMENT (OUTPATIENT)
Age: 80
End: 2025-07-18

## 2025-08-05 ENCOUNTER — APPOINTMENT (OUTPATIENT)
Dept: DERMATOLOGY | Facility: CLINIC | Age: 80
End: 2025-08-05
Payer: MEDICARE

## 2025-08-05 PROCEDURE — 99213 OFFICE O/P EST LOW 20 MIN: CPT

## 2025-08-05 RX ORDER — FLUOROURACIL 50 MG/G
5 CREAM TOPICAL
Qty: 1 | Refills: 0 | Status: ACTIVE | COMMUNITY
Start: 2025-08-05 | End: 1900-01-01

## 2025-08-18 ENCOUNTER — APPOINTMENT (OUTPATIENT)
Dept: DERMATOLOGY | Facility: CLINIC | Age: 80
End: 2025-08-18
Payer: MEDICARE

## 2025-08-18 DIAGNOSIS — L82.1 OTHER SEBORRHEIC KERATOSIS: ICD-10-CM

## 2025-08-18 DIAGNOSIS — Z85.89 PERSONAL HISTORY OF MALIGNANT NEOPLASM OF OTHER ORGANS AND SYSTEMS: ICD-10-CM

## 2025-08-18 DIAGNOSIS — Z12.83 ENCOUNTER FOR SCREENING FOR MALIGNANT NEOPLASM OF SKIN: ICD-10-CM

## 2025-08-18 DIAGNOSIS — C44.629 SQUAMOUS CELL CARCINOMA OF SKIN OF LEFT UPPER LIMB, INCLUDING SHOULDER: ICD-10-CM

## 2025-08-18 DIAGNOSIS — L81.4 OTHER MELANIN HYPERPIGMENTATION: ICD-10-CM

## 2025-08-18 DIAGNOSIS — L58.1: ICD-10-CM

## 2025-08-18 DIAGNOSIS — L82.0 INFLAMED SEBORRHEIC KERATOSIS: ICD-10-CM

## 2025-08-18 DIAGNOSIS — L57.0 ACTINIC KERATOSIS: ICD-10-CM

## 2025-08-18 PROCEDURE — 17000 DESTRUCT PREMALG LESION: CPT

## 2025-08-18 PROCEDURE — 99214 OFFICE O/P EST MOD 30 MIN: CPT | Mod: 25

## 2025-08-18 PROCEDURE — 17003 DESTRUCT PREMALG LES 2-14: CPT

## 2025-08-20 ENCOUNTER — APPOINTMENT (OUTPATIENT)
Dept: DERMATOLOGY | Facility: CLINIC | Age: 80
End: 2025-08-20

## 2025-09-02 ENCOUNTER — OUTPATIENT (OUTPATIENT)
Dept: OUTPATIENT SERVICES | Facility: HOSPITAL | Age: 80
LOS: 1 days | End: 2025-09-02
Payer: MEDICARE

## 2025-09-02 ENCOUNTER — APPOINTMENT (OUTPATIENT)
Dept: MRI IMAGING | Facility: IMAGING CENTER | Age: 80
End: 2025-09-02
Payer: MEDICARE

## 2025-09-02 ENCOUNTER — APPOINTMENT (OUTPATIENT)
Dept: NEUROLOGY | Facility: CLINIC | Age: 80
End: 2025-09-02
Payer: MEDICARE

## 2025-09-02 ENCOUNTER — APPOINTMENT (OUTPATIENT)
Facility: CLINIC | Age: 80
End: 2025-09-02

## 2025-09-02 VITALS
OXYGEN SATURATION: 99 % | BODY MASS INDEX: 33.32 KG/M2 | DIASTOLIC BLOOD PRESSURE: 79 MMHG | HEART RATE: 86 BPM | HEIGHT: 65 IN | TEMPERATURE: 97.8 F | WEIGHT: 200 LBS | SYSTOLIC BLOOD PRESSURE: 140 MMHG | RESPIRATION RATE: 16 BRPM

## 2025-09-02 DIAGNOSIS — Z98.891 HISTORY OF UTERINE SCAR FROM PREVIOUS SURGERY: Chronic | ICD-10-CM

## 2025-09-02 DIAGNOSIS — Z00.8 ENCOUNTER FOR OTHER GENERAL EXAMINATION: ICD-10-CM

## 2025-09-02 DIAGNOSIS — C50.919 MALIGNANT NEOPLASM OF UNSPECIFIED SITE OF UNSPECIFIED FEMALE BREAST: ICD-10-CM

## 2025-09-02 DIAGNOSIS — R90.89 OTHER ABNORMAL FINDINGS ON DIAGNOSTIC IMAGING OF CENTRAL NERVOUS SYSTEM: ICD-10-CM

## 2025-09-02 DIAGNOSIS — Z90.711 ACQUIRED ABSENCE OF UTERUS WITH REMAINING CERVICAL STUMP: Chronic | ICD-10-CM

## 2025-09-02 DIAGNOSIS — Z90.5 ACQUIRED ABSENCE OF KIDNEY: Chronic | ICD-10-CM

## 2025-09-02 DIAGNOSIS — Z92.21 PERSONAL HISTORY OF ANTINEOPLASTIC CHEMOTHERAPY: Chronic | ICD-10-CM

## 2025-09-02 PROCEDURE — 70553 MRI BRAIN STEM W/O & W/DYE: CPT

## 2025-09-02 PROCEDURE — 70553 MRI BRAIN STEM W/O & W/DYE: CPT | Mod: 26

## 2025-09-02 PROCEDURE — A9585: CPT

## 2025-09-02 PROCEDURE — 99213 OFFICE O/P EST LOW 20 MIN: CPT

## 2025-09-18 ENCOUNTER — APPOINTMENT (OUTPATIENT)
Dept: INTERNAL MEDICINE | Facility: CLINIC | Age: 80
End: 2025-09-18
Payer: MEDICARE

## 2025-09-18 VITALS
OXYGEN SATURATION: 97 % | HEIGHT: 65 IN | DIASTOLIC BLOOD PRESSURE: 84 MMHG | BODY MASS INDEX: 33.32 KG/M2 | WEIGHT: 200 LBS | HEART RATE: 101 BPM | SYSTOLIC BLOOD PRESSURE: 136 MMHG | RESPIRATION RATE: 16 BRPM

## 2025-09-18 DIAGNOSIS — E83.42 HYPOMAGNESEMIA: ICD-10-CM

## 2025-09-18 DIAGNOSIS — E66.9 OBESITY, UNSPECIFIED: ICD-10-CM

## 2025-09-18 DIAGNOSIS — M54.9 DORSALGIA, UNSPECIFIED: ICD-10-CM

## 2025-09-18 DIAGNOSIS — E11.9 TYPE 2 DIABETES MELLITUS W/OUT COMPLICATIONS: ICD-10-CM

## 2025-09-18 DIAGNOSIS — R10.13 EPIGASTRIC PAIN: ICD-10-CM

## 2025-09-18 DIAGNOSIS — Z52.4 KIDNEY DONOR: ICD-10-CM

## 2025-09-18 DIAGNOSIS — R79.89 OTHER SPECIFIED ABNORMAL FINDINGS OF BLOOD CHEMISTRY: ICD-10-CM

## 2025-09-18 DIAGNOSIS — C50.919 MALIGNANT NEOPLASM OF UNSPECIFIED SITE OF UNSPECIFIED FEMALE BREAST: ICD-10-CM

## 2025-09-18 DIAGNOSIS — Z85.89 PERSONAL HISTORY OF MALIGNANT NEOPLASM OF OTHER ORGANS AND SYSTEMS: ICD-10-CM

## 2025-09-18 DIAGNOSIS — R25.2 CRAMP AND SPASM: ICD-10-CM

## 2025-09-18 DIAGNOSIS — Z23 ENCOUNTER FOR IMMUNIZATION: ICD-10-CM

## 2025-09-18 DIAGNOSIS — R90.89 OTHER ABNORMAL FINDINGS ON DIAGNOSTIC IMAGING OF CENTRAL NERVOUS SYSTEM: ICD-10-CM

## 2025-09-18 DIAGNOSIS — G40.89 OTHER SEIZURES: ICD-10-CM

## 2025-09-18 DIAGNOSIS — E78.5 HYPERLIPIDEMIA, UNSPECIFIED: ICD-10-CM

## 2025-09-18 DIAGNOSIS — I10 ESSENTIAL (PRIMARY) HYPERTENSION: ICD-10-CM

## 2025-09-18 DIAGNOSIS — N18.32 CHRONIC KIDNEY DISEASE, STAGE 3B: ICD-10-CM

## 2025-09-18 PROCEDURE — G0008: CPT

## 2025-09-18 PROCEDURE — 99214 OFFICE O/P EST MOD 30 MIN: CPT | Mod: 25

## 2025-09-18 PROCEDURE — 90662 IIV NO PRSV INCREASED AG IM: CPT

## 2025-09-18 PROCEDURE — 36415 COLL VENOUS BLD VENIPUNCTURE: CPT

## 2025-09-18 PROCEDURE — G2211 COMPLEX E/M VISIT ADD ON: CPT

## 2025-09-19 ENCOUNTER — TRANSCRIPTION ENCOUNTER (OUTPATIENT)
Age: 80
End: 2025-09-19

## 2025-09-19 ENCOUNTER — NON-APPOINTMENT (OUTPATIENT)
Age: 80
End: 2025-09-19

## 2025-09-21 LAB
25(OH)D3 SERPL-MCNC: 73.9 NG/ML
ALBUMIN SERPL ELPH-MCNC: 4.2 G/DL
ALBUMIN, RANDOM URINE: 10.3 MG/DL
ALP BLD-CCNC: 93 U/L
ALT SERPL-CCNC: 30 U/L
ANION GAP SERPL CALC-SCNC: 16 MMOL/L
APPEARANCE: CLEAR
AST SERPL-CCNC: 17 U/L
BACTERIA: NEGATIVE /HPF
BASOPHILS # BLD AUTO: 0.01 K/UL
BASOPHILS NFR BLD AUTO: 0.1 %
BILIRUB SERPL-MCNC: 0.3 MG/DL
BILIRUBIN URINE: NEGATIVE
BLOOD URINE: NEGATIVE
BUN SERPL-MCNC: 33 MG/DL
CALCIUM SERPL-MCNC: 9.9 MG/DL
CAST: 0 /LPF
CHLORIDE SERPL-SCNC: 92 MMOL/L
CHOLEST SERPL-MCNC: 198 MG/DL
CO2 SERPL-SCNC: 23 MMOL/L
COLOR: YELLOW
CREAT SERPL-MCNC: 1.46 MG/DL
CREAT SPEC-SCNC: 48 MG/DL
CREAT SPEC-SCNC: 48 MG/DL
CREAT/PROT UR: 0.4 RATIO
EGFRCR SERPLBLD CKD-EPI 2021: 36 ML/MIN/1.73M2
EOSINOPHIL # BLD AUTO: 0.2 K/UL
EOSINOPHIL NFR BLD AUTO: 2.8 %
EPITHELIAL CELLS: 7 /HPF
ESTIMATED AVERAGE GLUCOSE: 266 MG/DL
GGT SERPL-CCNC: 70 U/L
GLUCOSE QUALITATIVE U: >=1000 MG/DL
GLUCOSE SERPL-MCNC: 600 MG/DL
GLYCOMARK.: 2.3 UG/ML
HBA1C MFR BLD HPLC: 10.9 %
HCT VFR BLD CALC: 40.7 %
HDLC SERPL-MCNC: 57 MG/DL
HGB BLD-MCNC: 13.2 G/DL
IMM GRANULOCYTES NFR BLD AUTO: 0.3 %
KETONES URINE: NEGATIVE MG/DL
LDLC SERPL-MCNC: 113 MG/DL
LEUKOCYTE ESTERASE URINE: NEGATIVE
LYMPHOCYTES # BLD AUTO: 1.49 K/UL
LYMPHOCYTES NFR BLD AUTO: 20.5 %
MAGNESIUM SERPL-MCNC: 1.7 MG/DL
MAN DIFF?: NORMAL
MCHC RBC-ENTMCNC: 29.8 PG
MCHC RBC-ENTMCNC: 32.4 G/DL
MCV RBC AUTO: 91.9 FL
MICROALBUMIN/CREAT 24H UR-RTO: 214 MG/G
MICROSCOPIC-UA: NORMAL
MONOCYTES # BLD AUTO: 0.44 K/UL
MONOCYTES NFR BLD AUTO: 6.1 %
NEUTROPHILS # BLD AUTO: 5.1 K/UL
NEUTROPHILS NFR BLD AUTO: 70.2 %
NITRITE URINE: NEGATIVE
NONHDLC SERPL-MCNC: 140 MG/DL
PH URINE: 6
PHOSPHATE SERPL-MCNC: 4.2 MG/DL
PLATELET # BLD AUTO: 269 K/UL
POTASSIUM SERPL-SCNC: 4.1 MMOL/L
PROT SERPL-MCNC: 6.9 G/DL
PROT UR-MCNC: 19 MG/DL
PROTEIN URINE: 30 MG/DL
RBC # BLD: 4.43 M/UL
RBC # FLD: 13.3 %
RED BLOOD CELLS URINE: 1 /HPF
SODIUM SERPL-SCNC: 132 MMOL/L
SPECIFIC GRAVITY URINE: 1.03
T3 SERPL-MCNC: 103 NG/DL
T4 FREE SERPL-MCNC: 1.3 NG/DL
TRIGL SERPL-MCNC: 159 MG/DL
TSH SERPL-ACNC: 3.64 UIU/ML
UROBILINOGEN URINE: 0.2 MG/DL
WBC # FLD AUTO: 7.26 K/UL
WHITE BLOOD CELLS URINE: 1 /HPF

## 2025-09-22 PROBLEM — R74.8 ELEVATED SERUM GGT LEVEL: Status: ACTIVE | Noted: 2025-09-22

## 2025-09-22 PROBLEM — E11.65 UNCONTROLLED TYPE 2 DIABETES MELLITUS WITH HYPERGLYCEMIA: Status: ACTIVE | Noted: 2025-09-21
